# Patient Record
Sex: FEMALE | Race: WHITE | NOT HISPANIC OR LATINO | URBAN - METROPOLITAN AREA
[De-identification: names, ages, dates, MRNs, and addresses within clinical notes are randomized per-mention and may not be internally consistent; named-entity substitution may affect disease eponyms.]

---

## 2017-10-09 ENCOUNTER — EMERGENCY (EMERGENCY)
Facility: HOSPITAL | Age: 52
LOS: 1 days | Discharge: HOME | End: 2017-10-09
Attending: EMERGENCY MEDICINE | Admitting: EMERGENCY MEDICINE
Payer: MEDICARE

## 2017-10-09 VITALS
HEIGHT: 66 IN | TEMPERATURE: 99 F | RESPIRATION RATE: 20 BRPM | DIASTOLIC BLOOD PRESSURE: 96 MMHG | OXYGEN SATURATION: 100 % | WEIGHT: 130.07 LBS | SYSTOLIC BLOOD PRESSURE: 170 MMHG | HEART RATE: 108 BPM

## 2017-10-09 VITALS
DIASTOLIC BLOOD PRESSURE: 88 MMHG | TEMPERATURE: 98 F | SYSTOLIC BLOOD PRESSURE: 139 MMHG | RESPIRATION RATE: 16 BRPM | OXYGEN SATURATION: 98 % | HEART RATE: 83 BPM

## 2017-10-09 DIAGNOSIS — S42.009A FRACTURE OF UNSPECIFIED PART OF UNSPECIFIED CLAVICLE, INITIAL ENCOUNTER FOR CLOSED FRACTURE: Chronic | ICD-10-CM

## 2017-10-09 DIAGNOSIS — F31.30 BIPOLAR DISORDER, CURRENT EPISODE DEPRESSED, MILD OR MODERATE SEVERITY, UNSPECIFIED: ICD-10-CM

## 2017-10-09 DIAGNOSIS — R69 ILLNESS, UNSPECIFIED: ICD-10-CM

## 2017-10-09 LAB
AMPHET UR-MCNC: POSITIVE
ANION GAP SERPL CALC-SCNC: 17 MMOL/L — SIGNIFICANT CHANGE UP (ref 5–17)
APAP SERPL-MCNC: <7.5 UG/ML — LOW (ref 10–26)
APPEARANCE UR: CLEAR — SIGNIFICANT CHANGE UP
BACTERIA # UR AUTO: ABNORMAL
BARBITURATES UR SCN-MCNC: NEGATIVE — SIGNIFICANT CHANGE UP
BASOPHILS # BLD AUTO: 0 K/UL — SIGNIFICANT CHANGE UP (ref 0–0.2)
BASOPHILS NFR BLD AUTO: 0 % — SIGNIFICANT CHANGE UP (ref 0–2)
BENZODIAZ UR-MCNC: NEGATIVE — SIGNIFICANT CHANGE UP
BILIRUB UR-MCNC: NEGATIVE — SIGNIFICANT CHANGE UP
BUN SERPL-MCNC: 17 MG/DL — SIGNIFICANT CHANGE UP (ref 8–20)
CALCIUM SERPL-MCNC: 9.3 MG/DL — SIGNIFICANT CHANGE UP (ref 8.6–10.2)
CHLORIDE SERPL-SCNC: 101 MMOL/L — SIGNIFICANT CHANGE UP (ref 98–107)
CO2 SERPL-SCNC: 22 MMOL/L — SIGNIFICANT CHANGE UP (ref 22–29)
COCAINE METAB.OTHER UR-MCNC: POSITIVE
COLOR SPEC: YELLOW — SIGNIFICANT CHANGE UP
CREAT SERPL-MCNC: 0.64 MG/DL — SIGNIFICANT CHANGE UP (ref 0.5–1.3)
DIFF PNL FLD: ABNORMAL
EOSINOPHIL # BLD AUTO: 0 K/UL — SIGNIFICANT CHANGE UP (ref 0–0.5)
EOSINOPHIL NFR BLD AUTO: 0 % — SIGNIFICANT CHANGE UP (ref 0–6)
EPI CELLS # UR: SIGNIFICANT CHANGE UP
GLUCOSE SERPL-MCNC: 144 MG/DL — HIGH (ref 70–115)
GLUCOSE UR QL: 50 MG/DL
HCG UR QL: NEGATIVE — SIGNIFICANT CHANGE UP
HCT VFR BLD CALC: 42.1 % — SIGNIFICANT CHANGE UP (ref 37–47)
HGB BLD-MCNC: 13.7 G/DL — SIGNIFICANT CHANGE UP (ref 12–16)
KETONES UR-MCNC: NEGATIVE — SIGNIFICANT CHANGE UP
LEUKOCYTE ESTERASE UR-ACNC: ABNORMAL
LYMPHOCYTES # BLD AUTO: 1 K/UL — SIGNIFICANT CHANGE UP (ref 1–4.8)
LYMPHOCYTES # BLD AUTO: 10 % — LOW (ref 20–55)
MCHC RBC-ENTMCNC: 27.8 PG — SIGNIFICANT CHANGE UP (ref 27–31)
MCHC RBC-ENTMCNC: 32.5 G/DL — SIGNIFICANT CHANGE UP (ref 32–36)
MCV RBC AUTO: 85.4 FL — SIGNIFICANT CHANGE UP (ref 81–99)
METHADONE UR-MCNC: NEGATIVE — SIGNIFICANT CHANGE UP
MONOCYTES # BLD AUTO: 0.4 K/UL — SIGNIFICANT CHANGE UP (ref 0–0.8)
MONOCYTES NFR BLD AUTO: 3 % — SIGNIFICANT CHANGE UP (ref 3–10)
NEUTROPHILS # BLD AUTO: 10.2 K/UL — HIGH (ref 1.8–8)
NEUTROPHILS NFR BLD AUTO: 85 % — HIGH (ref 37–73)
NITRITE UR-MCNC: NEGATIVE — SIGNIFICANT CHANGE UP
OPIATES UR-MCNC: NEGATIVE — SIGNIFICANT CHANGE UP
PCP SPEC-MCNC: SIGNIFICANT CHANGE UP
PCP UR-MCNC: NEGATIVE — SIGNIFICANT CHANGE UP
PH UR: 6 — SIGNIFICANT CHANGE UP (ref 5–8)
PLAT MORPH BLD: NORMAL — SIGNIFICANT CHANGE UP
PLATELET # BLD AUTO: 383 K/UL — SIGNIFICANT CHANGE UP (ref 150–400)
POTASSIUM SERPL-MCNC: 3.6 MMOL/L — SIGNIFICANT CHANGE UP (ref 3.5–5.3)
POTASSIUM SERPL-SCNC: 3.6 MMOL/L — SIGNIFICANT CHANGE UP (ref 3.5–5.3)
PROT UR-MCNC: 30 MG/DL
RBC # BLD: 4.93 M/UL — SIGNIFICANT CHANGE UP (ref 4.4–5.2)
RBC # FLD: 14.1 % — SIGNIFICANT CHANGE UP (ref 11–15.6)
RBC BLD AUTO: NORMAL — SIGNIFICANT CHANGE UP
RBC CASTS # UR COMP ASSIST: SIGNIFICANT CHANGE UP /HPF (ref 0–4)
SALICYLATES SERPL-MCNC: <2 MG/DL — LOW (ref 10–20)
SODIUM SERPL-SCNC: 140 MMOL/L — SIGNIFICANT CHANGE UP (ref 135–145)
SP GR SPEC: 1.01 — SIGNIFICANT CHANGE UP (ref 1.01–1.02)
THC UR QL: POSITIVE
UROBILINOGEN FLD QL: 4 MG/DL
VARIANT LYMPHS # BLD: 2 % — SIGNIFICANT CHANGE UP (ref 0–6)
WBC # BLD: 11.6 K/UL — HIGH (ref 4.8–10.8)
WBC # FLD AUTO: 11.6 K/UL — HIGH (ref 4.8–10.8)
WBC UR QL: SIGNIFICANT CHANGE UP

## 2017-10-09 PROCEDURE — 81001 URINALYSIS AUTO W/SCOPE: CPT

## 2017-10-09 PROCEDURE — 99283 EMERGENCY DEPT VISIT LOW MDM: CPT

## 2017-10-09 PROCEDURE — 93010 ELECTROCARDIOGRAM REPORT: CPT

## 2017-10-09 PROCEDURE — 36415 COLL VENOUS BLD VENIPUNCTURE: CPT

## 2017-10-09 PROCEDURE — 85027 COMPLETE CBC AUTOMATED: CPT

## 2017-10-09 PROCEDURE — 99284 EMERGENCY DEPT VISIT MOD MDM: CPT

## 2017-10-09 PROCEDURE — 90792 PSYCH DIAG EVAL W/MED SRVCS: CPT

## 2017-10-09 PROCEDURE — 93005 ELECTROCARDIOGRAM TRACING: CPT

## 2017-10-09 PROCEDURE — 80307 DRUG TEST PRSMV CHEM ANLYZR: CPT

## 2017-10-09 PROCEDURE — 81025 URINE PREGNANCY TEST: CPT

## 2017-10-09 PROCEDURE — 80048 BASIC METABOLIC PNL TOTAL CA: CPT

## 2017-10-09 RX ORDER — DEXTROAMPHETAMINE SACCHARATE, AMPHETAMINE ASPARTATE, DEXTROAMPHETAMINE SULFATE AND AMPHETAMINE SULFATE 1.875; 1.875; 1.875; 1.875 MG/1; MG/1; MG/1; MG/1
1 TABLET ORAL
Qty: 28 | Refills: 0
Start: 2017-10-09 | End: 2017-10-23

## 2017-10-09 NOTE — ED STATDOCS - MEDICAL DECISION MAKING DETAILS
Will have pt evaluated by . Will have pt evaluated by . Will refer pt for follow up as she does not currently have anyone to follow up with.

## 2017-10-09 NOTE — ED ADULT NURSE NOTE - CHPI ED SYMPTOMS NEG
no weakness/no suicidal/no hallucinations/no change in level of consciousness/no confusion/no disorientation/no homicidal/no paranoia

## 2017-10-09 NOTE — ED STATDOCS - OBJECTIVE STATEMENT
52 year old female presenting to the ED for a medication refill. Pt states that she had been taking an antidepressant and that she believes she is experiencing withdrawal symptoms as she stopped taking the medication. She states that she was also prescribed Xanax, Adderall, and Suboxone. Pt states that she had "weened herself off of the Suboxone". Pt denies having any SI or HI. No further complaints at this time. 52 year old female presenting to the ED for a medication refill. Pt states that she had been taking an antidepressant and that she believes she is experiencing withdrawal symptoms as she stopped taking the medication. She states that she was also prescribed Xanax, Adderall, and Suboxone. Pt states that she had "weened herself off of the Suboxone". She states that she does not currently have a psychiatrist with whom she can follow up. Pt denies having any SI or HI. No further complaints at this time.

## 2017-10-09 NOTE — ED ADULT NURSE NOTE - OBJECTIVE STATEMENT
pt states she has run out of medications and is not able to get a refill because her provider dropped her because of testing positive for cocaine.  pt states she has been depressed x5 days with intermittent periods of crying. pt states she had suicidal thoughts 5 days ago but no intention of harming herself. denies suicidal or homicidal thoughts denies hallucinations both A/V, pt presents with depressed affect appears organized. pt requesting rx for paxil and adderral

## 2017-10-09 NOTE — ED BEHAVIORAL HEALTH ASSESSMENT NOTE - HPI (INCLUDE ILLNESS QUALITY, SEVERITY, DURATION, TIMING, CONTEXT, MODIFYING FACTORS, ASSOCIATED SIGNS AND SYMPTOMS)
Pt. is a 52 yo  female who presents to the ED for medication refills.  She reports she was previously under the care of Arabella Mac for psychiatric medications and Suboxone.  She said she made a mistake and used cocaine which showed up in her drug screen so her provider refuses to prescribe any of her medication.  Pt. reports she takes : Suboxone which she stopped, Xanax 0.5mg BID PRN, Prozac 20mg QD; Lamictal 100mg QAM and Adderall 5mg QAM and Qnoon.  Pt. reports hx of shoulder injury which makes it difficult for her to work.  She is working p/t at [employer] and finds the Adderall helps her to focus.  She reports she has not had Suboxone in five days and went through withdrawal, sweating, nausea.  Denies any withdrawal sxs. currently.  Pt. denies any suicidal or homicidal ideation/plan/intent.  She reports she had SI about five years ago but did not act on it.  She denies auditory/visual/hallucinations, paranoia, delusions, mood lability.  She lives by herself in her own apartment.   three times.  Pt. reports her sister who is in the mental health field is supportive and she speaks to her several times a week. Sleep and appetite are WNL.  Pt. would like new provider.  Will link with [facility].
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
## 2017-10-09 NOTE — ED BEHAVIORAL HEALTH ASSESSMENT NOTE - SUICIDE PROTECTIVE FACTORS
Future oriented/Engaged in work or school/Identifies reasons for living/Supportive social network or family/Responsibility to family and others/Positive therapeutic relationships

## 2018-05-24 ENCOUNTER — APPOINTMENT (OUTPATIENT)
Dept: CARDIOLOGY | Facility: CLINIC | Age: 53
End: 2018-05-24

## 2018-07-30 NOTE — ED BEHAVIORAL HEALTH ASSESSMENT NOTE - NS ED BHA MED ROS EYES
How Severe Is Your Skin Lesion?: moderate Have Your Skin Lesions Been Treated?: not been treated Is This A New Presentation, Or A Follow-Up?: Skin Lesions No complaints

## 2019-08-19 ENCOUNTER — EMERGENCY (EMERGENCY)
Facility: HOSPITAL | Age: 54
LOS: 1 days | Discharge: HOME | End: 2019-08-19
Attending: EMERGENCY MEDICINE
Payer: COMMERCIAL

## 2019-08-19 VITALS
SYSTOLIC BLOOD PRESSURE: 155 MMHG | WEIGHT: 132.06 LBS | HEART RATE: 98 BPM | HEIGHT: 66 IN | RESPIRATION RATE: 18 BRPM | OXYGEN SATURATION: 96 % | DIASTOLIC BLOOD PRESSURE: 90 MMHG | TEMPERATURE: 99 F

## 2019-08-19 DIAGNOSIS — S42.009A FRACTURE OF UNSPECIFIED PART OF UNSPECIFIED CLAVICLE, INITIAL ENCOUNTER FOR CLOSED FRACTURE: Chronic | ICD-10-CM

## 2019-08-19 PROCEDURE — 99283 EMERGENCY DEPT VISIT LOW MDM: CPT

## 2019-08-19 PROCEDURE — 73080 X-RAY EXAM OF ELBOW: CPT | Mod: 26,LT

## 2019-08-19 PROCEDURE — 73080 X-RAY EXAM OF ELBOW: CPT

## 2019-08-19 RX ORDER — IPRATROPIUM/ALBUTEROL SULFATE 18-103MCG
3 AEROSOL WITH ADAPTER (GRAM) INHALATION ONCE
Refills: 0 | Status: DISCONTINUED | OUTPATIENT
Start: 2019-08-19 | End: 2019-08-19
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
## 2019-08-19 NOTE — ED STATDOCS - CLINICAL SUMMARY MEDICAL DECISION MAKING FREE TEXT BOX
Ms. Valle is a 55 yo F with a h/o COPD who presents with increased SOB/cough and L elbow pain. L elbow has TTP to medial and lateral surfaces, and will receive and X-ray and pain control. For her SOB she will get a CXR, CBC, and duoneb treatment. We will re-assess after additional evaluation. Ms. Valle is a 53 yo F with a h/o COPD who presents with increased SOB/cough and L elbow pain. L elbow has TTP to medial and lateral surfaces, and will receive and X-ray and pain control.  We will re-assess after additional evaluation.

## 2019-08-19 NOTE — ED STATDOCS - ATTENDING CONTRIBUTION TO CARE
pt with elbow pain and hx copd. Reports a fall two weeks ago now numb and tingling in  hand to elbow. No diminished ROM. NVI. No deformity.   Pt reports increased use nebulizer/inhaler over past two days but lungs min diminished r only.  agree with care.   I, Amanda Head, performed the initial face to face bedside interview with this patient regarding history of present illness, review of symptoms and relevant past medical, social and family history.  I completed an independent physical examination.  I was the initial provider who evaluated this patient. I have signed out the follow up of any pending tests (i.e. labs, radiological studies) to the ACP.  I have communicated the patient’s plan of care and disposition with the ACP.

## 2019-08-19 NOTE — ED STATDOCS - MUSCULOSKELETAL, MLM
L elbow TTP on medial and lateral epicondyles. Normal range of motion of L elbow. Normal ROM R elbow, no TTP R elbow.
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
## 2019-08-19 NOTE — ED STATDOCS - PROGRESS NOTE DETAILS
PA NOTE: No acute findings on xray. Pt given follow up information for Dr. Lara if symptoms persist. NSAIDS / Rice therapy.

## 2019-09-04 ENCOUNTER — APPOINTMENT (OUTPATIENT)
Dept: PULMONOLOGY | Facility: CLINIC | Age: 54
End: 2019-09-04

## 2019-11-29 NOTE — ED ADULT NURSE NOTE - NS ED NURSE LEVEL OF CONSCIOUSNESS SPEECH
[FreeTextEntry1] : I, Favio Epps, solely acted as scribe for Dr. Manpreet Sandy on 11/27/2019.\par 
Speaking Coherently

## 2021-01-03 ENCOUNTER — INPATIENT (INPATIENT)
Facility: HOSPITAL | Age: 56
LOS: 3 days | Discharge: HOME | DRG: 247 | End: 2021-01-07
Attending: HOSPITALIST | Admitting: SPECIALIST
Payer: COMMERCIAL

## 2021-01-03 VITALS
HEIGHT: 66 IN | HEART RATE: 85 BPM | OXYGEN SATURATION: 96 % | TEMPERATURE: 99 F | RESPIRATION RATE: 16 BRPM | SYSTOLIC BLOOD PRESSURE: 140 MMHG | DIASTOLIC BLOOD PRESSURE: 92 MMHG | WEIGHT: 130.07 LBS

## 2021-01-03 DIAGNOSIS — S42.009A FRACTURE OF UNSPECIFIED PART OF UNSPECIFIED CLAVICLE, INITIAL ENCOUNTER FOR CLOSED FRACTURE: Chronic | ICD-10-CM

## 2021-01-03 DIAGNOSIS — I21.3 ST ELEVATION (STEMI) MYOCARDIAL INFARCTION OF UNSPECIFIED SITE: ICD-10-CM

## 2021-01-03 PROBLEM — J44.9 CHRONIC OBSTRUCTIVE PULMONARY DISEASE, UNSPECIFIED: Chronic | Status: ACTIVE | Noted: 2019-08-19

## 2021-01-03 LAB
ALBUMIN SERPL ELPH-MCNC: 3.9 G/DL — SIGNIFICANT CHANGE UP (ref 3.3–5.2)
ALBUMIN SERPL ELPH-MCNC: 4.4 G/DL — SIGNIFICANT CHANGE UP (ref 3.3–5.2)
ALP SERPL-CCNC: 115 U/L — SIGNIFICANT CHANGE UP (ref 40–120)
ALP SERPL-CCNC: 127 U/L — HIGH (ref 40–120)
ALT FLD-CCNC: 33 U/L — HIGH
ALT FLD-CCNC: 36 U/L — HIGH
ANION GAP SERPL CALC-SCNC: 12 MMOL/L — SIGNIFICANT CHANGE UP (ref 5–17)
ANION GAP SERPL CALC-SCNC: 15 MMOL/L — SIGNIFICANT CHANGE UP (ref 5–17)
APTT BLD: 54.9 SEC — HIGH (ref 27.5–35.5)
AST SERPL-CCNC: 171 U/L — HIGH
AST SERPL-CCNC: 172 U/L — HIGH
BASOPHILS # BLD AUTO: 0.08 K/UL — SIGNIFICANT CHANGE UP (ref 0–0.2)
BASOPHILS # BLD AUTO: 0.08 K/UL — SIGNIFICANT CHANGE UP (ref 0–0.2)
BASOPHILS NFR BLD AUTO: 0.4 % — SIGNIFICANT CHANGE UP (ref 0–2)
BASOPHILS NFR BLD AUTO: 0.5 % — SIGNIFICANT CHANGE UP (ref 0–2)
BILIRUB SERPL-MCNC: 0.6 MG/DL — SIGNIFICANT CHANGE UP (ref 0.4–2)
BILIRUB SERPL-MCNC: 0.7 MG/DL — SIGNIFICANT CHANGE UP (ref 0.4–2)
BUN SERPL-MCNC: 15 MG/DL — SIGNIFICANT CHANGE UP (ref 8–20)
BUN SERPL-MCNC: 15 MG/DL — SIGNIFICANT CHANGE UP (ref 8–20)
CALCIUM SERPL-MCNC: 9 MG/DL — SIGNIFICANT CHANGE UP (ref 8.6–10.2)
CALCIUM SERPL-MCNC: 9.8 MG/DL — SIGNIFICANT CHANGE UP (ref 8.6–10.2)
CHLORIDE SERPL-SCNC: 102 MMOL/L — SIGNIFICANT CHANGE UP (ref 98–107)
CHLORIDE SERPL-SCNC: 97 MMOL/L — LOW (ref 98–107)
CK MB CFR SERPL CALC: 75.3 NG/ML — HIGH (ref 0–6.7)
CK MB CFR SERPL CALC: 91.5 NG/ML — HIGH (ref 0–6.7)
CK SERPL-CCNC: 1080 U/L — HIGH (ref 25–170)
CK SERPL-CCNC: 1104 U/L — HIGH (ref 25–170)
CO2 SERPL-SCNC: 23 MMOL/L — SIGNIFICANT CHANGE UP (ref 22–29)
CO2 SERPL-SCNC: 25 MMOL/L — SIGNIFICANT CHANGE UP (ref 22–29)
CREAT SERPL-MCNC: 0.45 MG/DL — LOW (ref 0.5–1.3)
CREAT SERPL-MCNC: 0.64 MG/DL — SIGNIFICANT CHANGE UP (ref 0.5–1.3)
EOSINOPHIL # BLD AUTO: 0.09 K/UL — SIGNIFICANT CHANGE UP (ref 0–0.5)
EOSINOPHIL # BLD AUTO: 0.12 K/UL — SIGNIFICANT CHANGE UP (ref 0–0.5)
EOSINOPHIL NFR BLD AUTO: 0.5 % — SIGNIFICANT CHANGE UP (ref 0–6)
EOSINOPHIL NFR BLD AUTO: 0.7 % — SIGNIFICANT CHANGE UP (ref 0–6)
GLUCOSE SERPL-MCNC: 136 MG/DL — HIGH (ref 70–99)
GLUCOSE SERPL-MCNC: 145 MG/DL — HIGH (ref 70–99)
HCT VFR BLD CALC: 38.8 % — SIGNIFICANT CHANGE UP (ref 34.5–45)
HCT VFR BLD CALC: 43.3 % — SIGNIFICANT CHANGE UP (ref 34.5–45)
HGB BLD-MCNC: 12.6 G/DL — SIGNIFICANT CHANGE UP (ref 11.5–15.5)
HGB BLD-MCNC: 14.2 G/DL — SIGNIFICANT CHANGE UP (ref 11.5–15.5)
IMM GRANULOCYTES NFR BLD AUTO: 0.4 % — SIGNIFICANT CHANGE UP (ref 0–1.5)
IMM GRANULOCYTES NFR BLD AUTO: 0.5 % — SIGNIFICANT CHANGE UP (ref 0–1.5)
INR BLD: 1.19 RATIO — HIGH (ref 0.88–1.16)
LACTATE SERPL-SCNC: 0.8 MMOL/L — SIGNIFICANT CHANGE UP (ref 0.5–2)
LYMPHOCYTES # BLD AUTO: 1.37 K/UL — SIGNIFICANT CHANGE UP (ref 1–3.3)
LYMPHOCYTES # BLD AUTO: 1.63 K/UL — SIGNIFICANT CHANGE UP (ref 1–3.3)
LYMPHOCYTES # BLD AUTO: 7.3 % — LOW (ref 13–44)
LYMPHOCYTES # BLD AUTO: 9.2 % — LOW (ref 13–44)
MAGNESIUM SERPL-MCNC: 1.6 MG/DL — LOW (ref 1.8–2.6)
MCHC RBC-ENTMCNC: 30.4 PG — SIGNIFICANT CHANGE UP (ref 27–34)
MCHC RBC-ENTMCNC: 30.6 PG — SIGNIFICANT CHANGE UP (ref 27–34)
MCHC RBC-ENTMCNC: 32.5 GM/DL — SIGNIFICANT CHANGE UP (ref 32–36)
MCHC RBC-ENTMCNC: 32.8 GM/DL — SIGNIFICANT CHANGE UP (ref 32–36)
MCV RBC AUTO: 93.3 FL — SIGNIFICANT CHANGE UP (ref 80–100)
MCV RBC AUTO: 93.7 FL — SIGNIFICANT CHANGE UP (ref 80–100)
MONOCYTES # BLD AUTO: 1.16 K/UL — HIGH (ref 0–0.9)
MONOCYTES # BLD AUTO: 1.32 K/UL — HIGH (ref 0–0.9)
MONOCYTES NFR BLD AUTO: 6.2 % — SIGNIFICANT CHANGE UP (ref 2–14)
MONOCYTES NFR BLD AUTO: 7.5 % — SIGNIFICANT CHANGE UP (ref 2–14)
NEUTROPHILS # BLD AUTO: 14.46 K/UL — HIGH (ref 1.8–7.4)
NEUTROPHILS # BLD AUTO: 16.02 K/UL — HIGH (ref 1.8–7.4)
NEUTROPHILS NFR BLD AUTO: 81.6 % — HIGH (ref 43–77)
NEUTROPHILS NFR BLD AUTO: 85.2 % — HIGH (ref 43–77)
PHOSPHATE SERPL-MCNC: 2.7 MG/DL — SIGNIFICANT CHANGE UP (ref 2.4–4.7)
PLATELET # BLD AUTO: 336 K/UL — SIGNIFICANT CHANGE UP (ref 150–400)
PLATELET # BLD AUTO: 394 K/UL — SIGNIFICANT CHANGE UP (ref 150–400)
POTASSIUM SERPL-MCNC: 3 MMOL/L — LOW (ref 3.5–5.3)
POTASSIUM SERPL-MCNC: 3.2 MMOL/L — LOW (ref 3.5–5.3)
POTASSIUM SERPL-SCNC: 3 MMOL/L — LOW (ref 3.5–5.3)
POTASSIUM SERPL-SCNC: 3.2 MMOL/L — LOW (ref 3.5–5.3)
PROCALCITONIN SERPL-MCNC: 19.63 NG/ML — HIGH (ref 0.02–0.1)
PROT SERPL-MCNC: 7 G/DL — SIGNIFICANT CHANGE UP (ref 6.6–8.7)
PROT SERPL-MCNC: 7.9 G/DL — SIGNIFICANT CHANGE UP (ref 6.6–8.7)
PROTHROM AB SERPL-ACNC: 13.7 SEC — HIGH (ref 10.6–13.6)
RBC # BLD: 4.14 M/UL — SIGNIFICANT CHANGE UP (ref 3.8–5.2)
RBC # BLD: 4.64 M/UL — SIGNIFICANT CHANGE UP (ref 3.8–5.2)
RBC # FLD: 13.6 % — SIGNIFICANT CHANGE UP (ref 10.3–14.5)
RBC # FLD: 13.7 % — SIGNIFICANT CHANGE UP (ref 10.3–14.5)
SARS-COV-2 RNA SPEC QL NAA+PROBE: SIGNIFICANT CHANGE UP
SODIUM SERPL-SCNC: 137 MMOL/L — SIGNIFICANT CHANGE UP (ref 135–145)
SODIUM SERPL-SCNC: 137 MMOL/L — SIGNIFICANT CHANGE UP (ref 135–145)
TROPONIN T SERPL-MCNC: 1.04 NG/ML — HIGH (ref 0–0.06)
TSH SERPL-MCNC: 0.55 UIU/ML — SIGNIFICANT CHANGE UP (ref 0.27–4.2)
WBC # BLD: 17.7 K/UL — HIGH (ref 3.8–10.5)
WBC # BLD: 18.8 K/UL — HIGH (ref 3.8–10.5)
WBC # FLD AUTO: 17.7 K/UL — HIGH (ref 3.8–10.5)
WBC # FLD AUTO: 18.8 K/UL — HIGH (ref 3.8–10.5)

## 2021-01-03 PROCEDURE — 93010 ELECTROCARDIOGRAM REPORT: CPT

## 2021-01-03 PROCEDURE — 71045 X-RAY EXAM CHEST 1 VIEW: CPT | Mod: 26

## 2021-01-03 PROCEDURE — 99222 1ST HOSP IP/OBS MODERATE 55: CPT

## 2021-01-03 PROCEDURE — 99291 CRITICAL CARE FIRST HOUR: CPT

## 2021-01-03 RX ORDER — CANGRELOR 50 MG/1
4 INJECTION, POWDER, LYOPHILIZED, FOR SOLUTION INTRAVENOUS
Qty: 50 | Refills: 0 | Status: DISCONTINUED | OUTPATIENT
Start: 2021-01-03 | End: 2021-01-03

## 2021-01-03 RX ORDER — HEPARIN SODIUM 5000 [USP'U]/ML
5000 INJECTION INTRAVENOUS; SUBCUTANEOUS ONCE
Refills: 0 | Status: COMPLETED | OUTPATIENT
Start: 2021-01-03 | End: 2021-01-03

## 2021-01-03 RX ORDER — INFLUENZA VIRUS VACCINE 15; 15; 15; 15 UG/.5ML; UG/.5ML; UG/.5ML; UG/.5ML
0.5 SUSPENSION INTRAMUSCULAR ONCE
Refills: 0 | Status: COMPLETED | OUTPATIENT
Start: 2021-01-03 | End: 2021-01-03

## 2021-01-03 RX ORDER — CHLORHEXIDINE GLUCONATE 213 G/1000ML
1 SOLUTION TOPICAL
Refills: 0 | Status: DISCONTINUED | OUTPATIENT
Start: 2021-01-03 | End: 2021-01-07

## 2021-01-03 RX ORDER — LOSARTAN POTASSIUM 100 MG/1
25 TABLET, FILM COATED ORAL DAILY
Refills: 0 | Status: DISCONTINUED | OUTPATIENT
Start: 2021-01-03 | End: 2021-01-07

## 2021-01-03 RX ORDER — ALBUTEROL 90 UG/1
2 AEROSOL, METERED ORAL EVERY 4 HOURS
Refills: 0 | Status: DISCONTINUED | OUTPATIENT
Start: 2021-01-03 | End: 2021-01-07

## 2021-01-03 RX ORDER — ASPIRIN/CALCIUM CARB/MAGNESIUM 324 MG
325 TABLET ORAL ONCE
Refills: 0 | Status: COMPLETED | OUTPATIENT
Start: 2021-01-03 | End: 2021-01-03

## 2021-01-03 RX ORDER — HEPARIN SODIUM 5000 [USP'U]/ML
5000 INJECTION INTRAVENOUS; SUBCUTANEOUS EVERY 8 HOURS
Refills: 0 | Status: DISCONTINUED | OUTPATIENT
Start: 2021-01-03 | End: 2021-01-07

## 2021-01-03 RX ORDER — IPRATROPIUM/ALBUTEROL SULFATE 18-103MCG
3 AEROSOL WITH ADAPTER (GRAM) INHALATION EVERY 6 HOURS
Refills: 0 | Status: DISCONTINUED | OUTPATIENT
Start: 2021-01-03 | End: 2021-01-07

## 2021-01-03 RX ORDER — LAMOTRIGINE 25 MG/1
100 TABLET, ORALLY DISINTEGRATING ORAL DAILY
Refills: 0 | Status: DISCONTINUED | OUTPATIENT
Start: 2021-01-03 | End: 2021-01-07

## 2021-01-03 RX ORDER — ATORVASTATIN CALCIUM 80 MG/1
80 TABLET, FILM COATED ORAL AT BEDTIME
Refills: 0 | Status: DISCONTINUED | OUTPATIENT
Start: 2021-01-03 | End: 2021-01-07

## 2021-01-03 RX ORDER — ONDANSETRON 8 MG/1
4 TABLET, FILM COATED ORAL ONCE
Refills: 0 | Status: COMPLETED | OUTPATIENT
Start: 2021-01-03 | End: 2021-01-03

## 2021-01-03 RX ORDER — NICOTINE POLACRILEX 2 MG
1 GUM BUCCAL DAILY
Refills: 0 | Status: DISCONTINUED | OUTPATIENT
Start: 2021-01-03 | End: 2021-01-07

## 2021-01-03 RX ORDER — POTASSIUM CHLORIDE 20 MEQ
40 PACKET (EA) ORAL ONCE
Refills: 0 | Status: COMPLETED | OUTPATIENT
Start: 2021-01-03 | End: 2021-01-03

## 2021-01-03 RX ORDER — BUDESONIDE AND FORMOTEROL FUMARATE DIHYDRATE 160; 4.5 UG/1; UG/1
2 AEROSOL RESPIRATORY (INHALATION)
Refills: 0 | Status: DISCONTINUED | OUTPATIENT
Start: 2021-01-03 | End: 2021-01-07

## 2021-01-03 RX ORDER — METOPROLOL TARTRATE 50 MG
25 TABLET ORAL
Refills: 0 | Status: DISCONTINUED | OUTPATIENT
Start: 2021-01-03 | End: 2021-01-07

## 2021-01-03 RX ORDER — TICAGRELOR 90 MG/1
90 TABLET ORAL EVERY 12 HOURS
Refills: 0 | Status: DISCONTINUED | OUTPATIENT
Start: 2021-01-03 | End: 2021-01-06

## 2021-01-03 RX ORDER — MAGNESIUM SULFATE 500 MG/ML
2 VIAL (ML) INJECTION ONCE
Refills: 0 | Status: COMPLETED | OUTPATIENT
Start: 2021-01-03 | End: 2021-01-03

## 2021-01-03 RX ORDER — ALPRAZOLAM 0.25 MG
0.25 TABLET ORAL EVERY 12 HOURS
Refills: 0 | Status: DISCONTINUED | OUTPATIENT
Start: 2021-01-03 | End: 2021-01-07

## 2021-01-03 RX ORDER — ASPIRIN/CALCIUM CARB/MAGNESIUM 324 MG
81 TABLET ORAL DAILY
Refills: 0 | Status: DISCONTINUED | OUTPATIENT
Start: 2021-01-04 | End: 2021-01-07

## 2021-01-03 RX ADMIN — Medication 40 MILLIEQUIVALENT(S): at 15:57

## 2021-01-03 RX ADMIN — ATORVASTATIN CALCIUM 80 MILLIGRAM(S): 80 TABLET, FILM COATED ORAL at 22:18

## 2021-01-03 RX ADMIN — LOSARTAN POTASSIUM 25 MILLIGRAM(S): 100 TABLET, FILM COATED ORAL at 14:54

## 2021-01-03 RX ADMIN — HEPARIN SODIUM 5000 UNIT(S): 5000 INJECTION INTRAVENOUS; SUBCUTANEOUS at 10:35

## 2021-01-03 RX ADMIN — Medication 0.25 MILLIGRAM(S): at 18:56

## 2021-01-03 RX ADMIN — Medication 50 GRAM(S): at 15:57

## 2021-01-03 RX ADMIN — ONDANSETRON 4 MILLIGRAM(S): 8 TABLET, FILM COATED ORAL at 10:58

## 2021-01-03 RX ADMIN — HEPARIN SODIUM 5000 UNIT(S): 5000 INJECTION INTRAVENOUS; SUBCUTANEOUS at 17:41

## 2021-01-03 RX ADMIN — Medication 325 MILLIGRAM(S): at 10:50

## 2021-01-03 RX ADMIN — TICAGRELOR 90 MILLIGRAM(S): 90 TABLET ORAL at 17:41

## 2021-01-03 RX ADMIN — LAMOTRIGINE 100 MILLIGRAM(S): 25 TABLET, ORALLY DISINTEGRATING ORAL at 14:54

## 2021-01-03 RX ADMIN — Medication 25 MILLIGRAM(S): at 18:00

## 2021-01-03 NOTE — ED PROVIDER NOTE - CLINICAL SUMMARY MEDICAL DECISION MAKING FREE TEXT BOX
Code STEMI activated by intake physician.  Repeat EKG with acute inf wall MI with reciprocal changes.  Case d/w Interventional Cardio/Masood Landis and will be in to take pt to cath lab

## 2021-01-03 NOTE — ED PROVIDER NOTE - PMH
Anxiety    Asthma    Chronic obstructive pulmonary disease, unspecified COPD type     Anxiety    Asthma    Chronic obstructive pulmonary disease, unspecified COPD type    HTN (hypertension)

## 2021-01-03 NOTE — H&P ADULT - NSICDXPASTMEDICALHX_GEN_ALL_CORE_FT
PAST MEDICAL HISTORY:  Anxiety     Asthma     Chronic obstructive pulmonary disease, unspecified COPD type     HTN (hypertension)

## 2021-01-03 NOTE — ED PROVIDER NOTE - MUSCULOSKELETAL NEGATIVE STATEMENT, MLM
ED HPI GENERAL MEDICAL PROBLEM





- General


Chief Complaint: Back Pain or Injury


Stated Complaint: Back pain


Time Seen by Provider: 11/12/20 04:00


Source of Information: Reports: Patient


History Limitations: Reports: No Limitations





- History of Present Illness


INITIAL COMMENTS - FREE TEXT/NARRATIVE: 





Patient comes emergency department for the second visit in the last 24 hours for

complaints of worsening left anterior chronic radicular type thigh pain that has

been worse for the past 24 hrs.  See my note from 11/11/2020 for the extensive 

history of this left anterior thigh radicular type pain that she has had since 

July that she has been seen multiple times for in the emergency department. She 

has had no injury recently. She just yesterday was seen in the pain clinic in 

[place] where she received a spinal injection which helped with her pain down the 

posterior aspect of her left thigh but nothing on the anterior aspect of the 

thigh.  She had good pain relief yesterday with the Dilaudid and the Phenergan 

and she was started on gabapentin although she is unable to sleep due to the 

pain this morning. No covid exposure no Covid Symptoms. 


Treatments PTA: Reports: Cold Therapy, Other (see below)


Other Treatments PTA: Started on Gabapentin today.


  ** left groin/thigh


Pain Score (Numeric/FACES): 8





- Related Data


                                    Allergies











Allergy/AdvReac Type Severity Reaction Status Date / Time


 


No Known Allergies Allergy   Verified 11/11/20 13:17











Home Meds: 


                                    Home Meds





Cyclobenzaprine [Flexeril] 10 mg PO TID PRN 11/07/20 [History]


Diclofenac Sodium 75 mg PO BID PRN 11/07/20 [History]


predniSONE [Prednisone] 20 mg PO DAILY #5 tablet 11/07/20 [Rx]


Gabapentin [Neurontin] 300 mg PO DAILY #30 cap 11/11/20 [Rx]











Past Medical History





- Past Health History


Medical/Surgical History: Denies Medical/Surgical History


Other Musculoskeletal History: sciatic pain, left sided SI joint problems


Oncologic (Cancer) History: Reports: Uterine





- Past Surgical History


Female  Surgical History: Reports: Hysterectomy





Social & Family History





- Family History


Family Medical History: Unobtainable





- Tobacco Use


Tobacco Use Status *Q: Unknown Ever Used Tobacco





- Caffeine Use


Caffeine Use: Reports: None





ED ROS GENERAL





- Review of Systems


Review Of Systems: Comprehensive ROS is negative, except as noted in HPI.





ED EXAM,LOWER BACK PAIN/INJURY





- Physical Exam


Exam: See Below


Text/Narrative:: 





The patient is somewhat anxious as she is sitting there constantly rubbing the 

anterior aspect of her left thigh.


Exam Limited By: No Limitations


General Appearance: Alert, WD/WN, Anxious


Respiratory/Chest: No Respiratory Distress


Cardiovascular: Normal Peripheral Pulses


Back Exam: Normal Inspection, Full Range of Motion.  No: Paraspinal Tenderness, 

Vertebral Tenderness


Extremities: Normal Inspection, Normal Range of Motion


Neurological: Alert, Normal Mood/Affect, Normal Dorsiflexion, Normal Plantar 

Flexion, Normal Gait, Normal Reflexes, No Motor/Sensory Deficits


DTR - Lower Extremities: 2+: Knee (R), Knee (L), Ankle (R), Ankle (L)


Psychiatric: Normal Affect, Normal Mood


Skin Exam: Warm, Dry, Intact, Normal Color, No Rash





Course





- Vital Signs


Last Recorded V/S: 


                                Last Vital Signs











Temp  97.7 F   11/12/20 04:00


 


Pulse  83   11/12/20 04:00


 


Resp  16   11/12/20 04:00


 


BP  116/78   11/12/20 04:00


 


Pulse Ox  99   11/12/20 04:00














- Orders/Labs/Meds


Meds: 


Medications














Discontinued Medications














Generic Name Dose Route Start Last Admin





  Trade Name Leoq  PRN Reason Stop Dose Admin


 


Hydromorphone HCl  1 mg  11/12/20 04:14  11/12/20 04:25





  Dilaudid  IM  11/12/20 04:15  1 mg





  ONETIME ONE   Administration


 


Oxycodone/Acetaminophen  1 packet  11/12/20 04:25  11/12/20 05:20





  Take Home: Acetamin/Oxycodon 325-5 Mg, 5 Pack  PO  11/12/20 04:26  1 packet





  ONETIME ONE   Administration


 


Promethazine HCl  25 mg  11/12/20 04:15  11/12/20 04:26





  Phenergan  IM  11/12/20 04:16  25 mg





  ONETIME ONE   Administration














- Re-Assessments/Exams


Free Text/Narrative Re-Assessment/Exam: 





11/12/20 


Patient was initially given Dilaudid 1 mg IM as well as 25 mg of Phenergan.





I also discussed other modalities for her treatment such as craniosacral therapy

 and/or massage.  I did have a rather extended conversation with this patient 

about the chronic usage of the emergency department for her chronic recurrent 

pain in the left anterior thigh.  She has received multiple injections of 

narcotic pain medication as well as prescriptions.  I also started her on 

gabapentin yesterday.  According to the patient herself she has not followed up 

with primary care very often and/or they are not doing much for her pain.  I 

feel that gabapentin should be a good course of pain management for her but we 

need to give this some time to get to therapy level.  This will be the last 

controlled substance prescription that she is prescribed from myself out of the 

emergency department for her chronic pain.  I will give her a small starter pack

 of 5 tablets of Percocet 53 25.  She must contact her primary care provider 

today and this chronic pain needs to be managed out of the primary care setting 

as this is best for the patient.  I also think that craniosacral therapy would 

be appropriate as well for her.  She is understanding of this and her questions 

are answered.








Departure





- Departure


Time of Disposition: 17:19


Disposition: Home, Self-Care 01


Clinical Impression: 


 Chronic SI joint pain








- Discharge Information


Instructions:  Chronic Back Pain, Easy-to-Read, Pain Medicine Instructions, 

Easy-to-Read


Referrals: 


PCP,Unobtain [Ordering Only Provider] - 


Forms:  ED Department Discharge


Additional Instructions: 


Continue with previous therapy. 


Add Percocet for breakthrough pain. 1 tablet every 6 hrs with food as needed for

pain. Caution sedation. Starter pack given from the ED.


Contact Dr. Weaver today consider a pelvic floor type MRI with your history of 

ovarian cancer. 


Also consider seeing a Cranio Sacral therapist for lower pelvis adjustments for 

pain and therapy. Such as [facility] in [place]. [phone]


Return to the ED if new or worsening symptoms. 


Follow up with Dr. Weaver today in the clinic. 





Sepsis Event Note (ED)





- Evaluation


Sepsis Screening Result: No Definite Risk





- Focused Exam


Vital Signs: 


                                   Vital Signs











  Temp Pulse Resp BP Pulse Ox


 


 11/12/20 04:00  97.7 F  83  16  116/78  99 no back pain, no gout, no musculoskeletal pain, no neck pain, and no weakness.

## 2021-01-03 NOTE — H&P ADULT - ASSESSMENT
1: Acute CAD with s/p distal RCA thrombectomy with angioplasty with one TATY with staged LCx CAD intervention   2: Hypokalemia   3: COPD with active tobacco use d/o   4: HfrEF, newly discovered   5: Anxiety and depression     Patient seen and examined   Full code   Needs MICU monitoring for now as post STEMI, post PCI and intervention, at risk for recurrent CP, life threatening arrhythmia     S/p PCI TATY to RCA and staged PCI for LCx  S/p Cangrelor ; DAPT with ASA and Brilinta  ACE, BB, HD statin, Cardiac rehab  Tele monitoring   Lipid panel, TSh and Aic    Duoneb *1 with PRn albuterol and scheduled Pulmicort  Tobacco cessation counseling , nicotine patch as needed   PFTs as outpatient     HOme meds started for anxiety and depression     I have personally provided  70 minutes of critical care time  including co-ordination of care excluding time spent on sperate procedures

## 2021-01-03 NOTE — ED ADULT NURSE NOTE - OBJECTIVE STATEMENT
Pt with no cardiac history sudden onset diarrhea, abd pain and vomiting yesterday and today states she is having SOB. EKG obtained in triage and code STEMI initiated, pt moved to  ER and MD Major and cardiologist at the bedside. Pt placed on CM and defib pads as precaution. large bore IV obtained.

## 2021-01-03 NOTE — ED PROVIDER NOTE - OBJECTIVE STATEMENT
56 yo F presents to ED c/o gradual onset of substernal chest aching this AM with assoc nausea, sweating and SOB.  Pt admits to increased N/V/D since yesterday 56 yo F presents to ED c/o gradual onset of substernal chest aching this AM with assoc nausea, sweating and SOB.  Pt admits to increased N/V/D since yesterday with decreased appetite.  Pt takes meds for HTN, depression and COPD, still smokes 1/4 ppd.  + family hx of CAD, brother with hx MI at age 54, father with hx CAD s/p coronary stenting.  Pt states pain has improved since arrival and now 4/10 from 10/10 prior to arrival

## 2021-01-03 NOTE — H&P ADULT - HISTORY OF PRESENT ILLNESS
54 y/o  Female who was in her USOH until couple of days ago when she started having nausea and vomiting without fever or abdomin pain or diarrhea, started having some atypical CP described as indigestion with subsequent progression to substernal CP. She presented to Urgent care for COVID testing but was sent since no capacity and had anxiety attack   Presented to [facility] ED and had evolving EKG with pathologic Q in V1, ST depressions in III, AVF. Troponin was 1.   Patient brought to cath lab for STEMI, now s/p [facility] which showed reduced EF 40-45% w/ inferior wall hypokinesis, mCirc 80% Awais grade 3 no intervention, dRCA 100% w/ successful thrombectomy/ PCI x 1 2.5 x 12 mm KALEY TATY. Cangrelor x 2 hours.   Admitted to ICU for further monitoring

## 2021-01-03 NOTE — ED STATDOCS - PROGRESS NOTE DETAILS
Mercedes LOZADA for ED attending, Dr. Mesa. Pt evaluated, PMHx of HTN and COPD, smoker, c/o chest pressure.   EKG demonstrates 1 mm ST elevation and v3 with anterior reciprocal change. Code STEMI called  and handed off to Dr. Major.

## 2021-01-03 NOTE — ED ADULT NURSE NOTE - ED STAT RN HANDOFF DETAILS
pt placed on transport monitor, VSS and in no apparent distress. pt transported to cath lab with cardiology PA.

## 2021-01-03 NOTE — H&P ADULT - NSHPPHYSICALEXAM_GEN_ALL_CORE
ICU Vital Signs Last 24 Hrs  T(C): 37.4 (03 Jan 2021 10:14), Max: 37.4 (03 Jan 2021 10:14)  T(F): 99.3 (03 Jan 2021 10:14), Max: 99.3 (03 Jan 2021 10:14)  HR: 69 (03 Jan 2021 11:00) (69 - 85)  BP: 132/92 (03 Jan 2021 11:00) (132/92 - 140/92)  BP(mean): --  ABP: --  ABP(mean): --  RR: 20 (03 Jan 2021 11:00) (16 - 22)  SpO2: 100% (03 Jan 2021 11:00) (96% - 100%)  General: minimal distress since CP and SOB as well as hungry   Neuro: AAO*3, No motor, sensory, or cranial nerve deficit  HEENT: Pupils equal, reactive to light, Oral mucosa moist  PULM: Clear to auscultation bilaterally, no significant adventitious breath sounds   CVS: Regular rhythm and controlled rate, no murmurs, rubs, or gallops  ABD: Soft, nondistended, nontender, normoactive bowel sounds, no CVA tenderness  EXT: No b/l LE edema, nontender with pedal pulse palpable   SKIN: Warm and well perfused, no acute rashes

## 2021-01-03 NOTE — CONSULT NOTE ADULT - ATTENDING COMMENTS
55F chronic active smoker/COPD, anxiety and with extensive family h/o premature CAD with chest pain/nausea/vomiting since last night now p/w acute inferior wall STEMI with high thrombus burden, also with mLCx 80%  -continue DAPT with ASA/ticagrelor, if insurance not able to cover then use for 1 month then switch to clopidogrel  -TTE pending  -stage PCI of LCx prior to discharge vs. electively as outpatient  -smoking cessation strongly encouraged        Octaviano Diaz DO, [facility]  Faculty Non-Invasive Cardiologist  [phone]

## 2021-01-03 NOTE — CONSULT NOTE ADULT - ASSESSMENT
This is a 55 yr old woman, current active 1PPD smoker, with PMH of COPD, generalized anxiety, asthma, lung nodule, that presented with 1 day history of midsternal chest pain associated with nausea and vomiting. Chest pain started yesterday and progressively got worse and never really went away, today it significantly got worse after being denied a covid test at an urgent care center which triggered an anxiety attack. Upon arrival to ED pt had evolving EKG with pathologic Q in V1, ST depressions in III, AVF. Troponin was 1, Patient brought to cath lab for STEMI, now s/p LHC which showed reduced EF 40-45% w/ inferior wall hypokinesis, mCirc 80% Awais grade 3 no intervention, dRCA 100% w/ successful thrombectomy/ PCI x 1 2.5 x 12 mm KALEY TATY. Cangrelor x 2 hours, patient is admitted to CTICU.     STEMI, Unstable Angina CCS4  - s/p LHC x1 TATY to RCA  - outpatient eval for possible return for staged PCI of mCIRC 80% lesion (AWAIS grade 3)   - Cangrelor infusion x 2 hours   - start ASA 81mg Daily, Brilinta 90mg BID, Lipitor 80mg daily, metoprolol 25mg BID  - EKG upon arrival to floor  - EKG/CBC/BMP/HgbA1c/Lipid Panel in AM  - Smoking cessation heavily reinforced  - Diet/lifestyle modifications and medication compliance heavily reinforced   - EKG upon complaint of chest pain   - Admit to CTICU   - if stable downgrade to 4 [place] in AM   - groin check in AM   - continuous telemetry monitoring     Ischemic CM/HFrEF  - EF 40-45% new onset   - inferior wall hypokinesis   - check echo re-eval EF 1/5/21  - start losartan 25mg daily   - monitor for s/s of volume overload   - strict I/O, daily weights  - dash diet     S/P Coronary Angiogram   - Patient educated about groin site care, signs and symptoms of complication post procedure, and plan of care moving forward. Patient verbalized understanding with teach-back.   - cardiac rehab info provided/referral and communication to cardiac rehab completed     Dispo   - STEMI Admission  - 72 Hour admission  - [facility] to follow patient   - for questions or concerns please call [phone] (Cardiology NP/PA)   - Sign out given to ICU PA

## 2021-01-03 NOTE — H&P ADULT - NSHPSOCIALHISTORY_GEN_ALL_CORE
Smokes 5 cigarettes per day for > 30 years   Occasional Wine with friends   Denied any recreational substances   Denied recent travel   On disability   Lives alone   Brother as NOK and HCP

## 2021-01-03 NOTE — CONSULT NOTE ADULT - SUBJECTIVE AND OBJECTIVE BOX
[facility]                          [address] [place] [address]                       Phone: [phone]. Fax:[phone]                      ________________________________________________    HPI: This is a 55 yr old woman, current active 1PPD smoker, with PMH of COPD, generalized anxiety, asthma, lung nodule, that presented with 1 day history of midsternal chest pain associated with nausea and vomiting. Chest pain started yesterday and progressively got worse and never really went away, today it significantly got worse after being denied a covid test at an urgent care center which triggered an anxiety attack. Upon arrival to ED pt had evolving EKG with pathologic Q in V1, ST depressions in III, AVF. Troponin was 1, Patient brought to cath lab for STEMI, now s/p LHC which showed reduced EF 40-45% w/ inferior wall hypokinesis, mCirc 80% Awais grade 3 no intervention, dRCA 100% w/ successful thrombectomy/ PCI x 1 2.5 x 12 mm KALEY TATY. Cangrelor x 2 hours, patient is admitted to CTICU.   ,    ROS: All review of systems negative unless indicated otherwise below.                          LAB RESULTS                   COMPLETE BLOOD COUNT( 03 Jan 2021 10:39 )                            14.2 g/dL  17.70 K/uL<H> )---------------( 394 K/uL                        43.3 %      Automated Differential     Auto Basophil # - 0.08 K/uL  Auto Basophil % - 0.5 %  Auto Eosinophil # - 0.12 K/uL  Auto Eosinophil % - 0.7 %  Auto Immature Granulocyte # - X      Auto Immature Granulocyte % - 0.5 %  Auto Lymphocyte # - 1.63 K/uL  Auto Lymphocyte % - 9.2 %<L>  Auto Monocyte # - 1.32 K/uL<H>  Auto Monocyte % - 7.5 %  Auto Neutrophil # - 14.46 K/uL<H>  Auto Neutrophil % - 81.6 %<H>                                  CHEMISTRY                 Basic Metabolic Panel (01-03-21 @ 10:39)    137  |  97<L>  |  15.0  ----------------------------<  145<H>  3.0<L>   |  25.0  |  0.64    Ca    9.8      03 Jan 2021 10:39                    Liver Functions (01-03-21 @ 10:39))  TPro  7.9  /  Alb  4.4  /  TBili  0.7  /  DBili  x   /  AST  171  /  ALT  36  /  AlkPhos  127     PT/INR/PTT ( 03 Jan 2021 10:39 )                        :                       :      13.7         :       54.9                  .        .                   .              .           .       1.19        .                                                                   Cardiac Enzymes   ( 03 Jan 2021 10:39 )  Troponin T  1.04<H>,  CPK  1080<H>, CKMB  X    , BNP X                              RADIOLOGY RESULTS: Personally visualized   Xray chest in ED - Preliminary personal report - clear lungs, normal heart size                          CARDIOLOGY RESULTS: Official Report/Preliminary Verbal Reports  CATH:   < from: Cardiac Cath Lab - Adult (01.03.21 @ 11:00) >  VENTRICLES: Analysis of regional contractile function demonstrated moderate  diaphragmatic hypokinesis and severe posterobasal hypokinesis. Global left  ventricular function was moderately depressed. EF estimated was 40 %.  CORONARY VESSELS: The coronary circulation is right dominant.  LM:   --  LM: Normal.  LAD:   --  LAD: Angiography showed mild atherosclerosis with no flow  limiting lesions.  CX:   --  Mid circumflex: There was a [id number] % stenosis. The lesion was  without evidence of thrombus. There was AWAIS grade 3 flow through the  vessel (brisk flow). This lesion is a likely culprit for the patient's  anginal symptoms.  RCA:   --  Distal RCA: There was a tubular 100 % stenosis. The lesion was  associated with a moderate filling defect consistent with thrombus. There  was AWAIS grade 0 flow through the vessel (no flow). This lesion is a  likely culprit for the patient's recent myocardial infarction. It appears  amenable to percutaneous intervention.  COMPLICATIONS: There were no complications.  INTERVENTIONAL RECOMMENDATIONS: Left heart catheterization demonstrated  severe disease of the LCX. The LAD has mild nonobstructive disease.  The RCA is occluded in the distal segment, just prior to the PDA and PLV  bifurcation.  The d LVEF is reduced at 40 - 45% with inferior wall hypokinesis.  Performed successful thrombectomy, PTCA and stenting to the RCA.  Very good results.  There is some distal thrombus noted in the PLV.  Cangrelar was started, to complete the infusion.  Start dual antiplatelet Rx.  Start BB and ACE/ARB Rx.  Discussed with the patient the importance of smoking cessation.  Plan for PCI to the LCX in 4-6 weeks providing she remains stable.  Prepared and signed by  Dawood Weaver MD  Signed 01/03/2021 11:56:11    < end of copied text >                          CARDIOLOGY REVIEW: Personally visualized and reviewed  Telemetry Last 24h: NSR 70s no ecotpy     HOME MEDICATIONS:  Advair Diskus 100 mcg-50 mcg inhalation powder: 1 puff(s) inhaled 2 times a day (12 Apr 2016 15:57)  lamoTRIgine 100 mg oral tablet, extended release: 1 tab(s) orally once a day (12 Apr 2016 15:57)  Paxil 40 mg oral tablet: 1 tab(s) orally once a day (12 Apr 2016 15:57)  Proventil HFA 90 mcg/inh inhalation aerosol: 2 puff(s) inhaled 4 times a day (12 Apr 2016 15:57)  Suboxone 8 mg-2 mg sublingual film: 2 each sublingual once a day (12 Apr 2016 15:57)  Xanax 1 mg oral tablet: 1 tab(s) orally 3 times a day, As Needed (12 Apr 2016 15:57)                             Current Admission Active Medications    ALPRAZolam 0.25 milliGRAM(s) Oral every 12 hours PRN Anxiety  atorvastatin 80 milliGRAM(s) Oral at bedtime  cangrelor Infusion 4 MICROgram(s)/kG/Min (70.8 mL/Hr) IV Continuous <Continuous>  lamoTRIgine 100 milliGRAM(s) Oral daily  losartan 25 milliGRAM(s) Oral daily  metoprolol tartrate 25 milliGRAM(s) Oral two times a day  nicotine - 21 mG/24Hr(s) Patch 1 patch Transdermal daily  ticagrelor 90 milliGRAM(s) Oral every 12 hours                        PHYSICAL EXAM:    Vital Signs Last 24 Hrs  T(C): 37.4 (03 Jan 2021 10:14), Max: 37.4 (03 Jan 2021 10:14)  T(F): 99.3 (03 Jan 2021 10:14), Max: 99.3 (03 Jan 2021 10:14)  HR: 69 (03 Jan 2021 11:00) (69 - 85)  BP: 132/92 (03 Jan 2021 11:00) (132/92 - 140/92)  BP(mean): --  RR: 20 (03 Jan 2021 11:00) (16 - 22)  SpO2: 100% (03 Jan 2021 11:00) (96% - 100%)    GENERAL: NAD  NECK: Supple, No JVD  NERVOUS SYSTEM:  Alert & Oriented X3, non focal neuro exam.   CHEST/LUNG: clear lungs, No rales, rhonchi, wheezing, or rubs  HEART: Regular rate and rhythm; s1 and s2 auscultated, No murmurs, rubs, or gallops  ABDOMEN: Soft, Nontender, Nondistended; Bowel sounds present and normoactive.   EXTREMITIES:  2+ Peripheral Pulses, No clubbing, cyanosis, or edema  CATH SITE: Right groin benign s/p cath s/p perclose.  No bleeding, no ecchymosis, no hematoma. Extremity Warm to touch, with palpable distal pulses, and brisk capillary refill.

## 2021-01-04 DIAGNOSIS — I21.3 ST ELEVATION (STEMI) MYOCARDIAL INFARCTION OF UNSPECIFIED SITE: ICD-10-CM

## 2021-01-04 LAB
A1C WITH ESTIMATED AVERAGE GLUCOSE RESULT: 4.8 % — SIGNIFICANT CHANGE UP (ref 4–5.6)
ANION GAP SERPL CALC-SCNC: 11 MMOL/L — SIGNIFICANT CHANGE UP (ref 5–17)
BUN SERPL-MCNC: 17 MG/DL — SIGNIFICANT CHANGE UP (ref 8–20)
CALCIUM SERPL-MCNC: 9 MG/DL — SIGNIFICANT CHANGE UP (ref 8.6–10.2)
CHLORIDE SERPL-SCNC: 104 MMOL/L — SIGNIFICANT CHANGE UP (ref 98–107)
CHOLEST SERPL-MCNC: 202 MG/DL — HIGH
CO2 SERPL-SCNC: 23 MMOL/L — SIGNIFICANT CHANGE UP (ref 22–29)
CREAT SERPL-MCNC: 0.59 MG/DL — SIGNIFICANT CHANGE UP (ref 0.5–1.3)
ESTIMATED AVERAGE GLUCOSE: 91 MG/DL — SIGNIFICANT CHANGE UP (ref 68–114)
GLUCOSE SERPL-MCNC: 117 MG/DL — HIGH (ref 70–99)
HCT VFR BLD CALC: 36.9 % — SIGNIFICANT CHANGE UP (ref 34.5–45)
HCV AB S/CO SERPL IA: 0.09 S/CO — SIGNIFICANT CHANGE UP (ref 0–0.99)
HCV AB SERPL-IMP: SIGNIFICANT CHANGE UP
HDLC SERPL-MCNC: 59 MG/DL — SIGNIFICANT CHANGE UP
HGB BLD-MCNC: 11.7 G/DL — SIGNIFICANT CHANGE UP (ref 11.5–15.5)
LIPID PNL WITH DIRECT LDL SERPL: 120 MG/DL — HIGH
MCHC RBC-ENTMCNC: 30.1 PG — SIGNIFICANT CHANGE UP (ref 27–34)
MCHC RBC-ENTMCNC: 31.7 GM/DL — LOW (ref 32–36)
MCV RBC AUTO: 94.9 FL — SIGNIFICANT CHANGE UP (ref 80–100)
NON HDL CHOLESTEROL: 143 MG/DL — HIGH
NT-PROBNP SERPL-SCNC: 1140 PG/ML — HIGH (ref 0–300)
PLATELET # BLD AUTO: 316 K/UL — SIGNIFICANT CHANGE UP (ref 150–400)
POTASSIUM SERPL-MCNC: 3.5 MMOL/L — SIGNIFICANT CHANGE UP (ref 3.5–5.3)
POTASSIUM SERPL-SCNC: 3.5 MMOL/L — SIGNIFICANT CHANGE UP (ref 3.5–5.3)
RAPID RVP RESULT: SIGNIFICANT CHANGE UP
RBC # BLD: 3.89 M/UL — SIGNIFICANT CHANGE UP (ref 3.8–5.2)
RBC # FLD: 13.8 % — SIGNIFICANT CHANGE UP (ref 10.3–14.5)
SARS-COV-2 IGG SERPL QL IA: NEGATIVE — SIGNIFICANT CHANGE UP
SARS-COV-2 IGM SERPL IA-ACNC: 0.07 INDEX — SIGNIFICANT CHANGE UP
SARS-COV-2 RNA SPEC QL NAA+PROBE: SIGNIFICANT CHANGE UP
SODIUM SERPL-SCNC: 138 MMOL/L — SIGNIFICANT CHANGE UP (ref 135–145)
TRIGL SERPL-MCNC: 116 MG/DL — SIGNIFICANT CHANGE UP
WBC # BLD: 13.54 K/UL — HIGH (ref 3.8–10.5)
WBC # FLD AUTO: 13.54 K/UL — HIGH (ref 3.8–10.5)

## 2021-01-04 PROCEDURE — 99233 SBSQ HOSP IP/OBS HIGH 50: CPT

## 2021-01-04 PROCEDURE — 93306 TTE W/DOPPLER COMPLETE: CPT | Mod: 26

## 2021-01-04 PROCEDURE — 99232 SBSQ HOSP IP/OBS MODERATE 35: CPT

## 2021-01-04 RX ORDER — LOPERAMIDE HCL 2 MG
2 TABLET ORAL ONCE
Refills: 0 | Status: COMPLETED | OUTPATIENT
Start: 2021-01-04 | End: 2021-01-04

## 2021-01-04 RX ADMIN — TICAGRELOR 90 MILLIGRAM(S): 90 TABLET ORAL at 17:23

## 2021-01-04 RX ADMIN — Medication 25 MILLIGRAM(S): at 17:23

## 2021-01-04 RX ADMIN — Medication 0.25 MILLIGRAM(S): at 20:52

## 2021-01-04 RX ADMIN — Medication 2 MILLIGRAM(S): at 14:51

## 2021-01-04 RX ADMIN — Medication 3 MILLILITER(S): at 16:05

## 2021-01-04 RX ADMIN — Medication 25 MILLIGRAM(S): at 05:26

## 2021-01-04 RX ADMIN — Medication 3 MILLILITER(S): at 09:19

## 2021-01-04 RX ADMIN — TICAGRELOR 90 MILLIGRAM(S): 90 TABLET ORAL at 05:26

## 2021-01-04 RX ADMIN — HEPARIN SODIUM 5000 UNIT(S): 5000 INJECTION INTRAVENOUS; SUBCUTANEOUS at 08:32

## 2021-01-04 RX ADMIN — Medication 81 MILLIGRAM(S): at 14:18

## 2021-01-04 RX ADMIN — HEPARIN SODIUM 5000 UNIT(S): 5000 INJECTION INTRAVENOUS; SUBCUTANEOUS at 20:52

## 2021-01-04 RX ADMIN — LOSARTAN POTASSIUM 25 MILLIGRAM(S): 100 TABLET, FILM COATED ORAL at 05:26

## 2021-01-04 RX ADMIN — CHLORHEXIDINE GLUCONATE 1 APPLICATION(S): 213 SOLUTION TOPICAL at 05:28

## 2021-01-04 RX ADMIN — HEPARIN SODIUM 5000 UNIT(S): 5000 INJECTION INTRAVENOUS; SUBCUTANEOUS at 14:18

## 2021-01-04 RX ADMIN — HEPARIN SODIUM 5000 UNIT(S): 5000 INJECTION INTRAVENOUS; SUBCUTANEOUS at 01:01

## 2021-01-04 RX ADMIN — Medication 0.25 MILLIGRAM(S): at 09:32

## 2021-01-04 RX ADMIN — ATORVASTATIN CALCIUM 80 MILLIGRAM(S): 80 TABLET, FILM COATED ORAL at 20:40

## 2021-01-04 RX ADMIN — Medication 3 MILLILITER(S): at 21:12

## 2021-01-04 RX ADMIN — LAMOTRIGINE 100 MILLIGRAM(S): 25 TABLET, ORALLY DISINTEGRATING ORAL at 14:18

## 2021-01-04 NOTE — PROGRESS NOTE ADULT - ASSESSMENT
54 y/o F, active smoker/drinker, with COPD, asthma, lung nodule, CAD subsequent STEMI now s/p dRCA stent placement with Dr. Weaver

## 2021-01-04 NOTE — PROGRESS NOTE ADULT - SUBJECTIVE AND OBJECTIVE BOX
Patient is a 55y old  Female who presents with a chief complaint of STEMI (04 Jan 2021 12:00)      BRIEF HOSPITAL COURSE: 56 y/o  Female who was in her USOH until couple of days ago when she started having nausea and vomiting without fever or abdomin pain or diarrhea, started having some atypical CP described as indigestion with subsequent progression to substernal CP. She presented to Urgent care for COVID testing but was sent since no capacity and had anxiety attack   Presented to [facility] ED and had evolving EKG with pathologic Q in V1, ST depressions in III, AVF. Troponin was 1.   Patient brought to cath lab for STEMI, now s/p C which showed reduced EF 40-45% w/ inferior wall hypokinesis, mCirc 80% Awais grade 3 no intervention, dRCA 100% w/ successful thrombectomy/ PCI x 1 2.5 x 12 mm KALEY TATY. Cangrelor x 2 hours. Admitted to ICU for further monitoring    Events last 24 hours: Patient remains stable overnight. No chest pain or acute events.     PAST MEDICAL & SURGICAL HISTORY:  HTN (hypertension)    Chronic obstructive pulmonary disease, unspecified COPD type    Asthma    Anxiety    Clavicle fracture      Review of Systems:  CONSTITUTIONAL: No fever, chills, or fatigue.  EYES: No eye pain, visual disturbances, or discharge.  ENMT:  No difficulty hearing, tinnitus, or vertigo. No sinus or throat pain.  NECK: No pain or stiffness.  RESPIRATORY: No shortness of breath, cough, or wheezing.  CARDIOVASCULAR: No chest pain, palpitations, dizziness, or leg swelling.  GASTROINTESTINAL: No abdominal or epigastric pain. No nausea, vomiting, diarrhea, or constipation. No hematemesis, melena, or hematochezia.  GENITOURINARY: No dysuria, increased frequency, hematuria, or incontinence.  NEUROLOGICAL: No headaches, memory loss, loss of strength, numbness, or tremors.  SKIN: No itching, burning, rashes, or lesions.  MUSCULOSKELETAL: No joint pain or swelling. No muscle, back, or extremity pain.  PSYCHIATRIC: No depression, anxiety, mood swings, or difficulty sleeping.    Medications:    losartan 25 milliGRAM(s) Oral daily  metoprolol tartrate 25 milliGRAM(s) Oral two times a day    ALBUTerol    90 MICROgram(s) HFA Inhaler 2 Puff(s) Inhalation every 4 hours PRN  albuterol/ipratropium for Nebulization 3 milliLiter(s) Nebulizer every 6 hours  budesonide 160 MICROgram(s)/formoterol 4.5 MICROgram(s) Inhaler 2 Puff(s) Inhalation two times a day    ALPRAZolam 0.25 milliGRAM(s) Oral every 12 hours PRN  lamoTRIgine 100 milliGRAM(s) Oral daily      aspirin  chewable 81 milliGRAM(s) Oral daily  heparin   Injectable 5000 Unit(s) SubCutaneous every 8 hours  ticagrelor 90 milliGRAM(s) Oral every 12 hours        atorvastatin 80 milliGRAM(s) Oral at bedtime        chlorhexidine 4% Liquid 1 Application(s) Topical <User Schedule>    nicotine - 21 mG/24Hr(s) Patch 1 patch Transdermal daily          ICU Vital Signs Last 24 Hrs  T(C): 36.6 (04 Jan 2021 07:00), Max: 37.4 (03 Jan 2021 15:52)  T(F): 97.8 (04 Jan 2021 07:00), Max: 99.4 (03 Jan 2021 15:52)  HR: 78 (04 Jan 2021 12:00) (60 - 84)  BP: 103/64 (04 Jan 2021 12:00) (84/52 - 130/80)  BP(mean): 78 (04 Jan 2021 12:00) (61 - 100)  ABP: --  ABP(mean): --  RR: 18 (04 Jan 2021 12:00) (14 - 24)  SpO2: 98% (04 Jan 2021 12:00) (94% - 100%)          I&O's Detail    03 Jan 2021 07:01  -  04 Jan 2021 07:00  --------------------------------------------------------  IN:    IV PiggyBack: 50 mL    IV PiggyBack: 50 mL    Oral Fluid: 480 mL  Total IN: 580 mL    OUT:    Voided (mL): 250 mL  Total OUT: 250 mL    Total NET: 330 mL      04 Jan 2021 07:01  -  04 Jan 2021 13:03  --------------------------------------------------------  IN:    Oral Fluid: 480 mL  Total IN: 480 mL    OUT:  Total OUT: 0 mL    Total NET: 480 mL          LABS:                        11.7   13.54 )-----------( 316      ( 04 Jan 2021 03:02 )             36.9     01-04    138  |  104  |  17.0  ----------------------------<  117<H>  3.5   |  23.0  |  0.59    Ca    9.0      04 Jan 2021 03:02  Phos  2.7     01-03  Mg     1.6     01-03    TPro  7.0  /  Alb  3.9  /  TBili  0.6  /  DBili  x   /  AST  172<H>  /  ALT  33<H>  /  AlkPhos  115  01-03      CARDIAC MARKERS ( 03 Jan 2021 13:43 )  x     / x     / 1104 U/L / x     / 91.5 ng/mL  CARDIAC MARKERS ( 03 Jan 2021 10:39 )  x     / 1.04 ng/mL / 1080 U/L / x     / 75.3 ng/mL      CAPILLARY BLOOD GLUCOSE        PT/INR - ( 03 Jan 2021 10:39 )   PT: 13.7 sec;   INR: 1.19 ratio         PTT - ( 03 Jan 2021 10:39 )  PTT:54.9 sec    CULTURES:  Rapid RVP Result: NotDetec (01-04 @ 01:13)      Physical Examination:      General: Well appearing, lying in bed in NAD.      HEENT: Pupils equal, reactive to light. Symmetric. No scleral icterus or injection.    PULM: Clear to auscultation B/L. No wheezes, rales, or rhonchi.     NECK: Supple, no lymphadenopathy, trachea midline.    CVS: Regular rate and rhythm, no murmurs, +s1/s2.    ABD: Soft, nondistended, nontender, normoactive bowel sounds.    EXT: No edema, nontender.    SKIN: Warm and well perfused, no rashes noted.    NEURO: Alert, oriented, interactive, nonfocal.

## 2021-01-04 NOTE — PROGRESS NOTE ADULT - SUBJECTIVE AND OBJECTIVE BOX
Department of Cardiology                                                                  [facility]/[place] [address] [facility] [place] [address]                                                                   Telephone: [phone]. Fax:[phone]    SUBJECTIVE:  "I'm not feeling as anxious; when can I go home".  Narrative: 54 y/o  F, active 1PPD smoker, active EtOH, PMHx of anxiety, COPD (known lung nodule?), asthma, now CAD admitted this hospitalization with unstable angina (CCSC IV) and STEMI now s/p left heart cardiac cath via RFA (+angioseal closure; no site complications) PCI/ POBA/ coronary thrombectomy with TATY placement to dRCA (resolute susan 2.5 x 12mm; EF 40% inf wall hypokinesis) with Dr. Weaver. Pt currently ambulating in room (boarding in CTICU); denies chest pain, palpitations, DUNBAR/SOB. Pt is post cangrelor gtt; no bleeding noted.   Will need to return for staged PCI and stent placement to left circumflex artery.     Of note, pt was scheduled to see Dr. Bear, Cardiologist per recommended and she never followed up with her appt 5/2018.  	  MEDICATIONS:  losartan 25 milliGRAM(s) Oral daily  metoprolol tartrate 25 milliGRAM(s) Oral two times a day  ALBUTerol    90 MICROgram(s) HFA Inhaler 2 Puff(s) Inhalation every 4 hours PRN  albuterol/ipratropium for Nebulization 3 milliLiter(s) Nebulizer every 6 hours  budesonide 160 MICROgram(s)/formoterol 4.5 MICROgram(s) Inhaler 2 Puff(s) Inhalation two times a day  ALPRAZolam 0.25 milliGRAM(s) Oral every 12 hours PRN  lamoTRIgine 100 milliGRAM(s) Oral daily  atorvastatin 80 milliGRAM(s) Oral at bedtime  aspirin  chewable 81 milliGRAM(s) Oral daily  chlorhexidine 4% Liquid 1 Application(s) Topical <User Schedule>  heparin   Injectable 5000 Unit(s) SubCutaneous every 8 hours  ticagrelor 90 milliGRAM(s) Oral every 12 hours    PHYSICAL EXAM:  T(C): 36.6 (01-04-21 @ 07:00), Max: 37.4 (01-03-21 @ 15:52)  HR: 69 (01-04-21 @ 11:00) (60 - 84)  BP: 102/66 (01-04-21 @ 11:00) (84/52 - 130/80)  RR: 24 (01-04-21 @ 11:00) (14 - 24)  SpO2: 98% (01-04-21 @ 11:00) (94% - 100%)  Wt(kg): --    I&O's Summary    03 Jan 2021 07:01  -  04 Jan 2021 07:00  --------------------------------------------------------  IN: 580 mL / OUT: 250 mL / NET: 330 mL    04 Jan 2021 07:01  -  04 Jan 2021 12:00  --------------------------------------------------------  IN: 480 mL / OUT: 0 mL / NET: 480 mL       Appearance: Normal	  HEENT:   Normal oral mucosa, PERRL, EOMI	  Lymphatic: No lymphadenopathy  Cardiovascular: Normal S1 S2, No JVD, No murmurs, No edema  Respiratory: Lungs clear to auscultation	  Psychiatry: A & O x 3, Mood & affect appropriate  Gastrointestinal:  Soft, Non-tender, + BS	  Skin: No rashes, No ecchymoses, No cyanosis  Neurologic: Non-focal  Extremities: Normal range of motion, No clubbing, cyanosis or edema  Vascular: Peripheral pulses palpable 2+ bilaterally  Right groin: no hematoma, soft, non tender, site C/D/i    TELEMETRY: reviewed by me; no overnight events        [ ]  Catheterization:  < from: Cardiac Cath Lab - Adult (01.03.21 @ 11:00) >  PROCEDURE:  --  Left heart catheterization with ventriculography.  --  Left coronary angiography.  --  Right coronary angiography.  --  Sonosite.  --  Coronary Thrombectomy.  --  Intervention on distal RCA: drug-eluting stent, balloon angioplasty,  thrombectomy.  TECHNIQUE: Cardiac catheterization performed emergently. Coronary  intervention performed emergently.  Local anesthetic given.Right femoral artery access. Left heart  catheterization. Ventriculography was performed. Left coronary artery  angiography. The vessel was injected utilizing a catheter. Right coronary  artery angiography. The vessel was injected utilizing a catheter.  Sonosite. RADIATION EXPOSURE: 1 min. A successful drug-eluting stent with  balloon angioplasty and thrombectomy was performed on the 100 % lesion in  the distal RCA. Following intervention there was an excellent angiographic  appearance with a 1 %residual stenosis. According to the ACC/AHA  classification system, this lesion was a type B1 lesion. The residual  lesion demonstrated a moderate filling defect consistent with thrombus.  There was KELY 0 flow before the procedure and KELY 3 flow after the  procedure. There was no dissection. Balloon angioplasty was performed,  using a EUPHORA 2.5MM X 15MM balloon, with 1 inflations and a maximum  inflation pressure of 12 liz. During the procedure, the previous guider  was changed for a 6F JR4.0LAUNCHER guider, and a new COUGAR 190 WIRE wire  was advanced across the lesion. A SUSAN 2.50 X 12MM drug-eluting stent was  placed across the lesion and deployed at a maximum inflation pressure of  20 liz. Coronary Thrombectomy.  CONTRAST GIVEN: Omnipaque 54 ml. Omnipaque 77 ml.  MEDICATIONS GIVEN: Midazolam, 2 mg, IV. Fentanyl, 50 mcg, IV. Potassium  chloride, 10 mEq, IV. Nicardipine (Cardene), 250 mcg, intracoronary.  Heparin, 3000 units, IV. 1% Lidocaine, 10 ml, subcutaneously. Cangrelor,  infusion rate of 72 mcg/kg/min, IV. Kengreal, 9 mcg/kg, IV.  VENTRICLES: Analysis of regional contractile function demonstrated moderate  diaphragmatic hypokinesis and severe posterobasal hypokinesis. Global left  ventricular function was moderately depressed. EF estimated was 40 %.  CORONARY VESSELS: The coronary circulation is right dominant.  LM:   --  LM: Normal.  LAD:   --  LAD: Angiography showed mild atherosclerosis with no flow  limiting lesions.  CX:   --  Mid circumflex: There was a [id number] % stenosis. The lesion was  without evidence of thrombus. There was KELY grade 3 flow through the  vessel (brisk flow). This lesion is a likely culprit for the patient's  anginal symptoms.  RCA:   --  Distal RCA: There was a tubular 100 % stenosis. The lesion was  associated with a moderate filling defect consistent with thrombus. There  was KELY grade 0 flow through the vessel (no flow). This lesion is a  likely culprit for the patient's recent myocardial infarction. It appears  amenable to percutaneous intervention.  COMPLICATIONS: There were no complications.  INTERVENTIONAL RECOMMENDATIONS: Left heart catheterization demonstrated  severe disease of the LCX. The LAD has mild nonobstructive disease.  The RCA is occluded in the distal segment, just prior to the PDA and PLV  bifurcation.  The d LVEF is reduced at 40 - 45% with inferior wall hypokinesis.  Performed successful thrombectomy, PTCA and stenting to the RCA.  Very good results.  There is some distal thrombus noted in the PLV.  Cangrelar was started, to complete the infusion.  Start dual antiplatelet Rx.  Start BB and ACE/ARB Rx.  Discussed with the patient the importance of smoking cessation.  Plan for PCI to the LCX in 4-6 weeks providing she remains stable.  Prepared and signed by  Dawood Weaver MD        LABS:	                         11.7   13.54 )-----------( 316      ( 04 Jan 2021 03:02 )             36.9     01-04    138  |  104  |  17.0  ----------------------------<  117<H>  3.5   |  23.0  |  0.59    Ca    9.0      04 Jan 2021 03:02  Phos  2.7     01-03  Mg     1.6     01-03    TPro  7.0  /  Alb  3.9  /  TBili  0.6  /  DBili  x   /  AST  172<H>  /  ALT  33<H>  /  AlkPhos  115  01-03    proBNP: Serum Pro-Brain Natriuretic Peptide: 1140 pg/mL (01-04 @ 03:02)      TSH: Thyroid Stimulating Hormone, Serum: 0.55 uIU/mL (01-03 @ 13:43)

## 2021-01-04 NOTE — PROGRESS NOTE ADULT - PROBLEM SELECTOR PLAN 1
-dual anti platelet therapy with aspirin and brilinta for stent protection  -high intensity statin; atorvastain 80mg  -BB; metoprolol 25mg BID  -ARB; losartan 25mg daily  -smoking and alcohol cessation discussed -dual anti platelet therapy with aspirin and brilinta for stent protection  -high intensity statin; atorvastatin 80mg  -BB; metoprolol 25mg BID  -ARB; losartan 25mg daily  -Cardiac diet and exercise modifications encouraged  -smoking and alcohol cessation discussed  -c/w tele montitoring  -discussed at great length medication compliance and follow ups  -will need to return to cath lab for staged procedure of circumflex artery  -[facility] following during hospitalization -dual anti platelet therapy with aspirin and brilinta for stent protection  -high intensity statin; atorvastatin 80mg  -BB; metoprolol 25mg BID  -ARB; losartan 25mg daily  -Cardiac diet and exercise modifications encouraged  -smoking and alcohol cessation discussed  -c/w tele montitoring  -discussed at great length medication compliance and follow ups  -will need to return to cath lab for staged procedure of circumflex artery  -[facility] following during hospitalization  -likely downgrade to telemetry floor for further monitoring and possible DC tomorrow if no events

## 2021-01-04 NOTE — PROGRESS NOTE ADULT - ASSESSMENT
Pt is a 56yo F presenting with a STEMI, underwent PCI with TATY to RCA.    1: Acute CAD with s/p distal RCA thrombectomy with angioplasty with one TATY with staged LCx CAD intervention   2: Hypokalemia   3: COPD with active tobacco use d/o   4: HfrEF, newly discovered   5: Anxiety and depression     - post STEMI and PCI management  - dual antiplatelet, ASA, Brilinta   - high dose statin  - ARB, BB  - Smoking cessation encouraged  - Telemetry monitoring  - Importance of cardiology f/u encouraged  - Patient to be downgraded from ICU to Tele today  - To be d/c tomorrow assuming no acute events as per cardiology

## 2021-01-04 NOTE — PROGRESS NOTE ADULT - SUBJECTIVE AND OBJECTIVE BOX
Called to bedside by RN, patient with hematoma in right groin. Pressure applied, hematoma compressed out. Patient placed on bedrest x 2 hours, HOB < 30 degrees with leg straight. No bleeding. no bruit. Now soft. RN to monitor for changes.

## 2021-01-04 NOTE — PROGRESS NOTE ADULT - SUBJECTIVE AND OBJECTIVE BOX
YORDY ARDON  ----------------------------------------  The patient was seen and evaluated for STEMI. Offers no complaints. Denied chest pain or dyspnea.    Vital Signs Last 24 Hrs  T(C): 37.4 (04 Jan 2021 12:00), Max: 37.4 (03 Jan 2021 15:52)  T(F): 99.3 (04 Jan 2021 12:00), Max: 99.4 (03 Jan 2021 15:52)  HR: 78 (04 Jan 2021 12:00) (60 - 84)  BP: 103/64 (04 Jan 2021 12:00) (84/52 - 130/80)  BP(mean): 78 (04 Jan 2021 12:00) (61 - 100)  RR: 18 (04 Jan 2021 12:00) (14 - 24)  SpO2: 98% (04 Jan 2021 12:00) (94% - 100%)    PHYSICAL EXAMINATION:  ----------------------------------------  General appearance: No acute distress, Awake, Alert  HEENT: Normocephalic, Atraumatic, Conjunctiva clear, EOMI  Neck: Supple, No JVD, No tenderness  Lungs: Breath sound equal bilaterally, No wheezes, No rales  Cardiovascular: S1S2, Regular rhythm  Abdomen: Soft, Nontender, Nondistended, No guarding/rebound, Positive bowel sounds  Extremities: No clubbing, No cyanosis, No edema, No calf tenderness  Neuro: Strength equal bilaterally, No tremors  Psychiatric: Appropriate mood, Normal affect    LABORATORY STUDIES:  ----------------------------------------             11.7   13.54 )-----------( 316      ( 04 Jan 2021 03:02 )             36.9     01-04    138  |  104  |  17.0  ----------------------------<  117<H>  3.5   |  23.0  |  0.59    Ca    9.0      04 Jan 2021 03:02  Phos  2.7     01-03  Mg     1.6     01-03    TPro  7.0  /  Alb  3.9  /  TBili  0.6  /  DBili  x   /  AST  172<H>  /  ALT  33<H>  /  AlkPhos  115  01-03    LIVER FUNCTIONS - ( 03 Jan 2021 13:43 )  Alb: 3.9 g/dL / Pro: 7.0 g/dL / ALK PHOS: 115 U/L / ALT: 33 U/L / AST: 172 U/L / GGT: x           PT/INR - ( 03 Jan 2021 10:39 )   PT: 13.7 sec;   INR: 1.19 ratio    PTT - ( 03 Jan 2021 10:39 )  PTT:54.9 sec    CARDIAC MARKERS ( 03 Jan 2021 13:43 )  x     / x     / 1104 U/L / x     / 91.5 ng/mL  CARDIAC MARKERS ( 03 Jan 2021 10:39 )  x     / 1.04 ng/mL / 1080 U/L / x     / 75.3 ng/mL    MEDICATIONS  (STANDING):  albuterol/ipratropium for Nebulization 3 milliLiter(s) Nebulizer every 6 hours  aspirin  chewable 81 milliGRAM(s) Oral daily  atorvastatin 80 milliGRAM(s) Oral at bedtime  budesonide 160 MICROgram(s)/formoterol 4.5 MICROgram(s) Inhaler 2 Puff(s) Inhalation two times a day  chlorhexidine 4% Liquid 1 Application(s) Topical <User Schedule>  heparin   Injectable 5000 Unit(s) SubCutaneous every 8 hours  lamoTRIgine 100 milliGRAM(s) Oral daily  losartan 25 milliGRAM(s) Oral daily  metoprolol tartrate 25 milliGRAM(s) Oral two times a day  nicotine - 21 mG/24Hr(s) Patch 1 patch Transdermal daily  ticagrelor 90 milliGRAM(s) Oral every 12 hours    MEDICATIONS  (PRN):  ALBUTerol    90 MICROgram(s) HFA Inhaler 2 Puff(s) Inhalation every 4 hours PRN Shortness of Breath  ALPRAZolam 0.25 milliGRAM(s) Oral every 12 hours PRN Anxiety      ASSESSMENT / PLAN:  ----------------------------------------  55F with a history of COPD, hypertension, and anxiety who presented with chest discomfort and was noted to have an STEMI requiring cardiac catheterization and percutaneous coronary intervention to the right coronary artery.    STEMI / Coronary artery disease - Status post percutaneous coronary intervention. Discussed with Cardiology, will potentially need a staged procedure for the circumflex. On ticagrelor, aspirin, and atorvastatin.    COPD - No dyspnea or wheezing noted on examination. On budesonide. Inhaled bronchodilator therapy as needed.    Hypertension - Close blood pressure monitoring. On losartan and metoprolol.    Anxiety - To resume lamotrigine.    Leukocytosis - Afebrile. No obvious signs of infection. Improved from admission. YORDY ARDON  ----------------------------------------  The patient was seen and evaluated for STEMI. Offers no complaints. Denied chest pain or dyspnea.    Vital Signs Last 24 Hrs  T(C): 37.4 (04 Jan 2021 12:00), Max: 37.4 (03 Jan 2021 15:52)  T(F): 99.3 (04 Jan 2021 12:00), Max: 99.4 (03 Jan 2021 15:52)  HR: 78 (04 Jan 2021 12:00) (60 - 84)  BP: 103/64 (04 Jan 2021 12:00) (84/52 - 130/80)  BP(mean): 78 (04 Jan 2021 12:00) (61 - 100)  RR: 18 (04 Jan 2021 12:00) (14 - 24)  SpO2: 98% (04 Jan 2021 12:00) (94% - 100%)    PHYSICAL EXAMINATION:  ----------------------------------------  General appearance: No acute distress, Awake, Alert  HEENT: Normocephalic, Atraumatic, Conjunctiva clear, EOMI  Neck: Supple, No JVD, No tenderness  Lungs: Breath sound equal bilaterally, No wheezes, No rales  Cardiovascular: S1S2, Regular rhythm  Abdomen: Soft, Nontender, Nondistended, No guarding/rebound, Positive bowel sounds  Extremities: No clubbing, No cyanosis, No edema, No calf tenderness  Neuro: Strength equal bilaterally, No tremors  Psychiatric: Appropriate mood, Normal affect    LABORATORY STUDIES:  ----------------------------------------             11.7   13.54 )-----------( 316      ( 04 Jan 2021 03:02 )             36.9     01-04    138  |  104  |  17.0  ----------------------------<  117<H>  3.5   |  23.0  |  0.59    Ca    9.0      04 Jan 2021 03:02  Phos  2.7     01-03  Mg     1.6     01-03    TPro  7.0  /  Alb  3.9  /  TBili  0.6  /  DBili  x   /  AST  172<H>  /  ALT  33<H>  /  AlkPhos  115  01-03    LIVER FUNCTIONS - ( 03 Jan 2021 13:43 )  Alb: 3.9 g/dL / Pro: 7.0 g/dL / ALK PHOS: 115 U/L / ALT: 33 U/L / AST: 172 U/L / GGT: x           PT/INR - ( 03 Jan 2021 10:39 )   PT: 13.7 sec;   INR: 1.19 ratio    PTT - ( 03 Jan 2021 10:39 )  PTT:54.9 sec    CARDIAC MARKERS ( 03 Jan 2021 13:43 )  x     / x     / 1104 U/L / x     / 91.5 ng/mL  CARDIAC MARKERS ( 03 Jan 2021 10:39 )  x     / 1.04 ng/mL / 1080 U/L / x     / 75.3 ng/mL    MEDICATIONS  (STANDING):  albuterol/ipratropium for Nebulization 3 milliLiter(s) Nebulizer every 6 hours  aspirin  chewable 81 milliGRAM(s) Oral daily  atorvastatin 80 milliGRAM(s) Oral at bedtime  budesonide 160 MICROgram(s)/formoterol 4.5 MICROgram(s) Inhaler 2 Puff(s) Inhalation two times a day  chlorhexidine 4% Liquid 1 Application(s) Topical <User Schedule>  heparin   Injectable 5000 Unit(s) SubCutaneous every 8 hours  lamoTRIgine 100 milliGRAM(s) Oral daily  losartan 25 milliGRAM(s) Oral daily  metoprolol tartrate 25 milliGRAM(s) Oral two times a day  nicotine - 21 mG/24Hr(s) Patch 1 patch Transdermal daily  ticagrelor 90 milliGRAM(s) Oral every 12 hours    MEDICATIONS  (PRN):  ALBUTerol    90 MICROgram(s) HFA Inhaler 2 Puff(s) Inhalation every 4 hours PRN Shortness of Breath  ALPRAZolam 0.25 milliGRAM(s) Oral every 12 hours PRN Anxiety      ASSESSMENT / PLAN:  ----------------------------------------  55F with a history of COPD, hypertension, and anxiety who presented with chest discomfort and was noted to have an STEMI requiring cardiac catheterization and percutaneous coronary intervention to the right coronary artery.    STEMI / Coronary artery disease - Status post percutaneous coronary intervention. Discussed with Cardiology, will potentially need a staged procedure for the circumflex. On ticagrelor, aspirin, and atorvastatin.    COPD - No dyspnea or wheezing noted on examination. On budesonide. Inhaled bronchodilator therapy as needed.    Hypertension - Close blood pressure monitoring. On losartan and metoprolol.    Anxiety - On lamotrigine.    Leukocytosis - Afebrile. No obvious signs of infection. Improved from admission.

## 2021-01-05 LAB
ANION GAP SERPL CALC-SCNC: 11 MMOL/L — SIGNIFICANT CHANGE UP (ref 5–17)
BUN SERPL-MCNC: 19 MG/DL — SIGNIFICANT CHANGE UP (ref 8–20)
CALCIUM SERPL-MCNC: 8.8 MG/DL — SIGNIFICANT CHANGE UP (ref 8.6–10.2)
CHLORIDE SERPL-SCNC: 103 MMOL/L — SIGNIFICANT CHANGE UP (ref 98–107)
CO2 SERPL-SCNC: 24 MMOL/L — SIGNIFICANT CHANGE UP (ref 22–29)
CREAT SERPL-MCNC: 0.57 MG/DL — SIGNIFICANT CHANGE UP (ref 0.5–1.3)
GLUCOSE SERPL-MCNC: 105 MG/DL — HIGH (ref 70–99)
HCT VFR BLD CALC: 38.4 % — SIGNIFICANT CHANGE UP (ref 34.5–45)
HGB BLD-MCNC: 12 G/DL — SIGNIFICANT CHANGE UP (ref 11.5–15.5)
MCHC RBC-ENTMCNC: 30.2 PG — SIGNIFICANT CHANGE UP (ref 27–34)
MCHC RBC-ENTMCNC: 31.3 GM/DL — LOW (ref 32–36)
MCV RBC AUTO: 96.5 FL — SIGNIFICANT CHANGE UP (ref 80–100)
PLATELET # BLD AUTO: 311 K/UL — SIGNIFICANT CHANGE UP (ref 150–400)
POTASSIUM SERPL-MCNC: 3.6 MMOL/L — SIGNIFICANT CHANGE UP (ref 3.5–5.3)
POTASSIUM SERPL-SCNC: 3.6 MMOL/L — SIGNIFICANT CHANGE UP (ref 3.5–5.3)
RBC # BLD: 3.98 M/UL — SIGNIFICANT CHANGE UP (ref 3.8–5.2)
RBC # FLD: 13.4 % — SIGNIFICANT CHANGE UP (ref 10.3–14.5)
SODIUM SERPL-SCNC: 138 MMOL/L — SIGNIFICANT CHANGE UP (ref 135–145)
WBC # BLD: 9.77 K/UL — SIGNIFICANT CHANGE UP (ref 3.8–10.5)
WBC # FLD AUTO: 9.77 K/UL — SIGNIFICANT CHANGE UP (ref 3.8–10.5)

## 2021-01-05 PROCEDURE — 99232 SBSQ HOSP IP/OBS MODERATE 35: CPT

## 2021-01-05 PROCEDURE — 93010 ELECTROCARDIOGRAM REPORT: CPT

## 2021-01-05 PROCEDURE — 99233 SBSQ HOSP IP/OBS HIGH 50: CPT

## 2021-01-05 PROCEDURE — 99233 SBSQ HOSP IP/OBS HIGH 50: CPT | Mod: GC

## 2021-01-05 RX ORDER — NITROGLYCERIN 6.5 MG
0.4 CAPSULE, EXTENDED RELEASE ORAL
Refills: 0 | Status: DISCONTINUED | OUTPATIENT
Start: 2021-01-05 | End: 2021-01-07

## 2021-01-05 RX ADMIN — TICAGRELOR 90 MILLIGRAM(S): 90 TABLET ORAL at 17:50

## 2021-01-05 RX ADMIN — HEPARIN SODIUM 5000 UNIT(S): 5000 INJECTION INTRAVENOUS; SUBCUTANEOUS at 16:34

## 2021-01-05 RX ADMIN — TICAGRELOR 90 MILLIGRAM(S): 90 TABLET ORAL at 05:28

## 2021-01-05 RX ADMIN — ATORVASTATIN CALCIUM 80 MILLIGRAM(S): 80 TABLET, FILM COATED ORAL at 22:24

## 2021-01-05 RX ADMIN — Medication 0.25 MILLIGRAM(S): at 11:17

## 2021-01-05 RX ADMIN — HEPARIN SODIUM 5000 UNIT(S): 5000 INJECTION INTRAVENOUS; SUBCUTANEOUS at 05:28

## 2021-01-05 RX ADMIN — Medication 0.25 MILLIGRAM(S): at 22:23

## 2021-01-05 RX ADMIN — Medication 25 MILLIGRAM(S): at 17:50

## 2021-01-05 RX ADMIN — Medication 3 MILLILITER(S): at 21:11

## 2021-01-05 RX ADMIN — Medication 25 MILLIGRAM(S): at 05:28

## 2021-01-05 RX ADMIN — Medication 3 MILLILITER(S): at 07:59

## 2021-01-05 RX ADMIN — Medication 81 MILLIGRAM(S): at 11:17

## 2021-01-05 RX ADMIN — LOSARTAN POTASSIUM 25 MILLIGRAM(S): 100 TABLET, FILM COATED ORAL at 05:29

## 2021-01-05 RX ADMIN — LAMOTRIGINE 100 MILLIGRAM(S): 25 TABLET, ORALLY DISINTEGRATING ORAL at 11:17

## 2021-01-05 RX ADMIN — HEPARIN SODIUM 5000 UNIT(S): 5000 INJECTION INTRAVENOUS; SUBCUTANEOUS at 22:24

## 2021-01-05 NOTE — PROGRESS NOTE ADULT - SUBJECTIVE AND OBJECTIVE BOX
YORDY ARDON  ----------------------------------------  The patient was seen and evaluated for STEMI. Reported an episode of chest pain. Diarrhea resolved.    Vital Signs Last 24 Hrs  T(C): 36.8 (05 Jan 2021 10:00), Max: 37.7 (04 Jan 2021 20:37)  T(F): 98.3 (05 Jan 2021 10:00), Max: 99.8 (04 Jan 2021 20:37)  HR: 82 (05 Jan 2021 10:00) (63 - 82)  BP: 101/67 (05 Jan 2021 10:00) (94/60 - 120/73)  BP(mean): 92 (04 Jan 2021 19:00) (71 - 92)  RR: 18 (05 Jan 2021 10:00) (17 - 21)  SpO2: 96% (05 Jan 2021 10:00) (95% - 100%)    PHYSICAL EXAMINATION:  ----------------------------------------  General appearance: No acute distress, Awake, Alert  HEENT: Normocephalic, Atraumatic, Conjunctiva clear, EOMI  Neck: Supple, No JVD, No tenderness  Lungs: Breath sound equal bilaterally, No wheezes, No rales  Cardiovascular: S1S2, Regular rhythm  Abdomen: Soft, Nontender, Nondistended, No guarding/rebound, Positive bowel sounds  Extremities: No clubbing, No cyanosis, No edema, No calf tenderness  Neuro: Strength equal bilaterally, No tremors  Psychiatric: Appropriate mood, Normal affect    LABORATORY STUDIES:  ----------------------------------------             12.0   9.77  )-----------( 311      ( 05 Jan 2021 06:58 )             38.4     01-05    138  |  103  |  19.0  ----------------------------<  105<H>  3.6   |  24.0  |  0.57    Ca    8.8      05 Jan 2021 06:58    MEDICATIONS  (STANDING):  albuterol/ipratropium for Nebulization 3 milliLiter(s) Nebulizer every 6 hours  aspirin  chewable 81 milliGRAM(s) Oral daily  atorvastatin 80 milliGRAM(s) Oral at bedtime  budesonide 160 MICROgram(s)/formoterol 4.5 MICROgram(s) Inhaler 2 Puff(s) Inhalation two times a day  chlorhexidine 4% Liquid 1 Application(s) Topical <User Schedule>  heparin   Injectable 5000 Unit(s) SubCutaneous every 8 hours  lamoTRIgine 100 milliGRAM(s) Oral daily  losartan 25 milliGRAM(s) Oral daily  metoprolol tartrate 25 milliGRAM(s) Oral two times a day  nicotine - 21 mG/24Hr(s) Patch 1 patch Transdermal daily  ticagrelor 90 milliGRAM(s) Oral every 12 hours    MEDICATIONS  (PRN):  ALBUTerol    90 MICROgram(s) HFA Inhaler 2 Puff(s) Inhalation every 4 hours PRN Shortness of Breath  ALPRAZolam 0.25 milliGRAM(s) Oral every 12 hours PRN Anxiety  nitroglycerin     SubLingual 0.4 milliGRAM(s) SubLingual every 5 minutes PRN Chest Pain      ASSESSMENT / PLAN:  ----------------------------------------  55F with a history of COPD, hypertension, and anxiety who presented with chest discomfort and was noted to have an STEMI requiring cardiac catheterization and percutaneous coronary intervention to the right coronary artery.    STEMI / Coronary artery disease - Status post percutaneous coronary intervention to the right coronary artery. On ticagrelor, aspirin, and atorvastatin. Discussed with Cardiology, planned for cardiac catheterization tomorrow.    COPD - No dyspnea or wheezing noted on examination. On budesonide. Inhaled bronchodilator therapy as needed.    Hypertension - Close blood pressure monitoring. On losartan and metoprolol.    Anxiety - On lamotrigine.    Leukocytosis - Afebrile. No obvious signs of infection. Now resolved.

## 2021-01-05 NOTE — PROGRESS NOTE ADULT - SUBJECTIVE AND OBJECTIVE BOX
[facility]                          [address] [place] [address]                       Phone: [phone]. Fax:[phone]                      ________________________________________________    HPI:  56 y/o  Female who was in her USOH until couple of days ago when she started having nausea and vomiting without fever or abdomin pain or diarrhea, started having some atypical CP described as indigestion with subsequent progression to substernal CP. She presented to Urgent care for COVID testing but was sent since no capacity and had anxiety attack   Presented to [facility] ED and had evolving EKG with pathologic Q in V1, ST depressions in III, AVF. Troponin was 1.   Patient brought to cath lab for STEMI, now s/p LHC which showed reduced EF 40-45% w/ inferior wall hypokinesis, mCirc 80% Awais grade 3 no intervention, dRCA 100% w/ successful thrombectomy/ PCI x 1 2.5 x 12 mm KALEY TATY. Cangrelor x 2 hours.   Admitted to ICU for further monitoring  (03 Jan 2021 12:52)    ROS: All review of systems negative unless indicated otherwise below.                          LAB RESULTS                   COMPLETE BLOOD COUNT( 05 Jan 2021 06:58 )                            12.0 g/dL  9.77 K/uL )---------------( 311 K/uL                        38.4 %      Automated Differential     Auto Basophil # - X      Auto Basophil % - X      Auto Eosinophil # - X      Auto Eosinophil % - X      Auto Immature Granulocyte # - X      Auto Immature Granulocyte % - X      Auto Lymphocyte # - X      Auto Lymphocyte % - X      Auto Monocyte # - X      Auto Monocyte % - X      Auto Neutrophil # - X      Auto Neutrophil % - X                                      CHEMISTRY                 Basic Metabolic Panel (01-05-21 @ 06:58)    138  |  103  |  19.0  ----------------------------<  105<H>  3.6   |  24.0  |  0.57    Ca    8.8      05 Jan 2021 06:58  Phos  2.7     01-03  Mg     1.6     01-03                    Liver Functions (01-03-21 @ 13:43))  TPro  7.0  /  Alb  3.9  /  TBili  0.6  /  DBili  x   /  AST  172  /  ALT  33  /  AlkPhos  115     PT/INR/PTT ( 03 Jan 2021 10:39 )                        :                       :      13.7         :       54.9                  .        .                   .              .           .       1.19        .                                                                   Cardiac Enzymes   ( 04 Jan 2021 03:02 )  Troponin T  X    ,  CPK  X    , CKMB  X    , BNP 1140<H>    , ( 03 Jan 2021 13:43 )  Troponin T  X    ,  CPK  1104<H>, CKMB  X    , BNP X        , ( 03 Jan 2021 10:39 )  Troponin T  1.04<H>,  CPK  1080<H>, CKMB  X    , BNP X                              MICROBIOLOGY/CULTURES:  Rapid RVP Result: NotDetec (01-04-21 @ 01:13)                          RADIOLOGY RESULTS: Personally visualized   < from: Xray Chest 1 View- PORTABLE-Urgent (Xray Chest 1 View- PORTABLE-Urgent .) (01.03.21 @ 10:42) >  IMPRESSION:   No evidence of active chest disease.    < end of copied text >                            CARDIOLOGY RESULTS: Official Report/Preliminary Verbal Reports    ECHO:   < from: TTE Echo Complete w/ Contrast w/ Doppler (01.04.21 @ 17:40) >  Summary:   1. Endocardial visualization was enhanced with intravenous echo contrast.   2. Mildly enlarged left atrium.   3. Segmental wall motion abnormalities in Left circumflex territory. (dominant Cx).   4. Left ventricular ejection fraction, by visual estimation, is 50 to 55%.   5. The right atrium is normal in size.   6. Normal right ventricular size and function.   7. No significant valvular abnormality.   8. Trivial pericardial effusion.    MD Rick, RPVI Electronically signed on 1/4/2021 at 8:19:42 PM    < end of copied text >    CATH:   < from: Cardiac Cath Lab - Adult (01.03.21 @ 11:00) >  VENTRICLES: Analysis of regional contractile function demonstrated moderate  diaphragmatic hypokinesis and severe posterobasal hypokinesis. Global left  ventricular function was moderately depressed. EF estimated was 40 %.  CORONARY VESSELS: The coronary circulation is right dominant.  LM:   --  LM: Normal.  LAD:   --  LAD: Angiography showed mild atherosclerosis with no flow  limiting lesions.  CX:   --  Mid circumflex: There was a [id number] % stenosis. The lesion was  without evidence of thrombus. There was AWAIS grade 3 flow through the  vessel (brisk flow). This lesion is a likely culprit for the patient's  anginal symptoms.  RCA:   --  Distal RCA: There was a tubular 100 % stenosis. The lesion was  associated with a moderate filling defect consistent with thrombus. There  was AWAIS grade 0 flow through the vessel (no flow). This lesion is a  likely culprit for the patient's recent myocardial infarction. It appears  amenable to percutaneous intervention.  COMPLICATIONS: There were no complications.  INTERVENTIONAL RECOMMENDATIONS: Left heart catheterization demonstrated  severe disease of the LCX. The LAD has mild nonobstructive disease.  The RCA is occluded in the distal segment, just prior to the PDA and PLV  bifurcation.  The d LVEF is reduced at 40 - 45% with inferior wall hypokinesis.  Performed successful thrombectomy, PTCA and stenting to the RCA.  Very good results.  There is some distal thrombus noted in the PLV.  Cangrelar was started, to complete the infusion.  Start dual antiplatelet Rx.  Start BB and ACE/ARB Rx.  Discussed with the patient the importance of smoking cessation.  Plan for PCI to the LCX in 4-6 weeks providing she remains stable.  Prepared and signed by  Dawood Weaver MD  Signed 01/03/2021 11:56:11    < end of copied text >                          CARDIOLOGY REVIEW: Personally visualized and reviewed  EKG: unchanged from previous EKG, NSR, non specific ST segment abnormalities, Inverted T waves III/AVF,   Telemetry Last 24h: SR 82, no ectopy                             INTAKE AND OUTPUT - 48 HOUR TREND   01-03-21 @ 07:01  -  01-04-21 @ 07:00  --------------------------------------------------------  IN:  Total IN: 0 mL    OUT:    Voided (mL): 250 mL  Total OUT: 250 mL    Total NET: -250 mL          HOME MEDICATIONS:  Advair Diskus 100 mcg-50 mcg inhalation powder: 1 puff(s) inhaled 2 times a day (12 Apr 2016 15:57)  lamoTRIgine 100 mg oral tablet, extended release: 1 tab(s) orally once a day (12 Apr 2016 15:57)  Paxil 40 mg oral tablet: 1 tab(s) orally once a day (12 Apr 2016 15:57)  Proventil HFA 90 mcg/inh inhalation aerosol: 2 puff(s) inhaled 4 times a day (12 Apr 2016 15:57)  Suboxone 8 mg-2 mg sublingual film: 2 each sublingual once a day (12 Apr 2016 15:57)  Xanax 1 mg oral tablet: 1 tab(s) orally 3 times a day, As Needed (12 Apr 2016 15:57)                             Current Admission Active Medications    ALBUTerol    90 MICROgram(s) HFA Inhaler 2 Puff(s) Inhalation every 4 hours PRN Shortness of Breath  albuterol/ipratropium for Nebulization 3 milliLiter(s) Nebulizer every 6 hours  ALPRAZolam 0.25 milliGRAM(s) Oral every 12 hours PRN Anxiety  aspirin  chewable 81 milliGRAM(s) Oral daily  atorvastatin 80 milliGRAM(s) Oral at bedtime  budesonide 160 MICROgram(s)/formoterol 4.5 MICROgram(s) Inhaler 2 Puff(s) Inhalation two times a day  chlorhexidine 4% Liquid 1 Application(s) Topical <User Schedule>  heparin   Injectable 5000 Unit(s) SubCutaneous every 8 hours  lamoTRIgine 100 milliGRAM(s) Oral daily  losartan 25 milliGRAM(s) Oral daily  metoprolol tartrate 25 milliGRAM(s) Oral two times a day  nicotine - 21 mG/24Hr(s) Patch 1 patch Transdermal daily  nitroglycerin     SubLingual 0.4 milliGRAM(s) SubLingual every 5 minutes PRN Chest Pain  ticagrelor 90 milliGRAM(s) Oral every 12 hours                        PHYSICAL EXAM:    Vital Signs Last 24 Hrs  T(C): 36.8 (05 Jan 2021 10:00), Max: 37.7 (04 Jan 2021 20:37)  T(F): 98.3 (05 Jan 2021 10:00), Max: 99.8 (04 Jan 2021 20:37)  HR: 82 (05 Jan 2021 10:00) (63 - 82)  BP: 101/67 (05 Jan 2021 10:00) (94/60 - 120/73)  BP(mean): 92 (04 Jan 2021 19:00) (71 - 92)  RR: 18 (05 Jan 2021 10:00) (17 - 21)  SpO2: 96% (05 Jan 2021 10:00) (95% - 100%)    GENERAL: NAD  NECK: Supple, No JVD  NERVOUS SYSTEM:  Alert & Oriented X3, non focal neuro exam.   CHEST/LUNG: clear lungs, No rales, rhonchi, wheezing, or rubs  HEART: Regular rate and rhythm; s1 and s2 auscultated, No murmurs, rubs, or gallops  ABDOMEN: Soft, Nontender, Nondistended; Bowel sounds present and normoactive.   EXTREMITIES:  2+ Peripheral Pulses, No clubbing, cyanosis, or edema  CATH SITE: Right groin benign s/p cath.  No bleeding, no ecchymosis, no hematoma. Extremity Warm to touch, with palpable distal pulses, and brisk capillary refill.

## 2021-01-05 NOTE — PROGRESS NOTE ADULT - ASSESSMENT
This is a 55 yr old woman, current active 1PPD smoker, with PMH of COPD, generalized anxiety, asthma, lung nodule, that presented with 1 day history of midsternal chest pain associated with nausea and vomiting. Chest pain started yesterday and progressively got worse and never really went away, today it significantly got worse after being denied a covid test at an urgent care center which triggered an anxiety attack. Upon arrival to ED pt had evolving EKG with pathologic Q in V1, ST depressions in III, AVF. Troponin was 1, Patient brought to cath lab for STEMI, now s/p [facility] which showed reduced EF 40-45% w/ inferior wall hypokinesis, mCirc 80% Awais grade 3 no intervention, dRCA 100% w/ successful thrombectomy/ PCI x 1 2.5 x 12 mm KALEY TATY. Cangrelor x 2 hours, patient is admitted to CTICU.     STEMI, Unstable Angina CCS4  - s/p [facility] x1 TATY to RCA  - patient with unstable angina today without EKG changes  - plan for Staged PCI of 80% Circ tomorrow  - Serial EKGs/Nitroglycerin   - continue ASA, Brilinta, Lipitor, metoprolol   -continue DAPT with ASA/ticagrelor, if insurance not able to cover then use for 1 month then switch to clopidogrel  - Smoking cessation heavily reinforced  - Diet/lifestyle modifications and medication compliance heavily reinforced     Hematoma   - right groin hematoma compressed yesterday  - today  Right groin benign  No bleeding, no ecchymosis, no hematoma. nontender Extremity Warm to touch, with palpable distal pulses, and brisk capillary refill.     Ischemic CM/HFrEF  - EF 40-45% new onset   - EF on repeat echo improved 50-55%  - continue losartan   - monitor for s/s of volume overload   - strict I/O, daily weights  - dash diet     S/P Coronary Angiogram   - Patient educated about groin site care, signs and symptoms of complication post procedure, and plan of care moving forward. Patient verbalized understanding with teach-back.   - cardiac rehab info provided/referral and communication to cardiac rehab completed     Dispo   - NPO @ [facility] for [facility] in AM for Staged PCI of 80% mCirc

## 2021-01-06 PROCEDURE — 99152 MOD SED SAME PHYS/QHP 5/>YRS: CPT

## 2021-01-06 PROCEDURE — 99233 SBSQ HOSP IP/OBS HIGH 50: CPT

## 2021-01-06 PROCEDURE — 92928 PRQ TCAT PLMT NTRAC ST 1 LES: CPT | Mod: LC

## 2021-01-06 PROCEDURE — 93454 CORONARY ARTERY ANGIO S&I: CPT | Mod: 26,78,59

## 2021-01-06 PROCEDURE — 93010 ELECTROCARDIOGRAM REPORT: CPT

## 2021-01-06 PROCEDURE — 99232 SBSQ HOSP IP/OBS MODERATE 35: CPT

## 2021-01-06 RX ORDER — CLOPIDOGREL BISULFATE 75 MG/1
75 TABLET, FILM COATED ORAL DAILY
Refills: 0 | Status: DISCONTINUED | OUTPATIENT
Start: 2021-01-07 | End: 2021-01-07

## 2021-01-06 RX ORDER — CLOPIDOGREL BISULFATE 75 MG/1
600 TABLET, FILM COATED ORAL ONCE
Refills: 0 | Status: COMPLETED | OUTPATIENT
Start: 2021-01-06 | End: 2021-01-06

## 2021-01-06 RX ORDER — ACETAMINOPHEN 500 MG
650 TABLET ORAL ONCE
Refills: 0 | Status: COMPLETED | OUTPATIENT
Start: 2021-01-06 | End: 2021-01-06

## 2021-01-06 RX ADMIN — LOSARTAN POTASSIUM 25 MILLIGRAM(S): 100 TABLET, FILM COATED ORAL at 04:44

## 2021-01-06 RX ADMIN — Medication 25 MILLIGRAM(S): at 17:24

## 2021-01-06 RX ADMIN — Medication 0.25 MILLIGRAM(S): at 21:37

## 2021-01-06 RX ADMIN — ATORVASTATIN CALCIUM 80 MILLIGRAM(S): 80 TABLET, FILM COATED ORAL at 20:32

## 2021-01-06 RX ADMIN — Medication 650 MILLIGRAM(S): at 20:31

## 2021-01-06 RX ADMIN — Medication 81 MILLIGRAM(S): at 06:35

## 2021-01-06 RX ADMIN — TICAGRELOR 90 MILLIGRAM(S): 90 TABLET ORAL at 04:44

## 2021-01-06 RX ADMIN — LAMOTRIGINE 100 MILLIGRAM(S): 25 TABLET, ORALLY DISINTEGRATING ORAL at 17:24

## 2021-01-06 RX ADMIN — Medication 3 MILLILITER(S): at 21:03

## 2021-01-06 RX ADMIN — Medication 25 MILLIGRAM(S): at 04:44

## 2021-01-06 RX ADMIN — HEPARIN SODIUM 5000 UNIT(S): 5000 INJECTION INTRAVENOUS; SUBCUTANEOUS at 20:32

## 2021-01-06 RX ADMIN — Medication 3 MILLILITER(S): at 16:31

## 2021-01-06 RX ADMIN — Medication 0.25 MILLIGRAM(S): at 10:32

## 2021-01-06 RX ADMIN — CLOPIDOGREL BISULFATE 600 MILLIGRAM(S): 75 TABLET, FILM COATED ORAL at 17:28

## 2021-01-06 NOTE — CHART NOTE - NSCHARTNOTEFT_GEN_A_CORE
Department of Cardiology                                                                  [facility]/[place] [address]  [place]                                                            Telephone: [phone]. Fax:[phone]                                                                                   Pre Cath Note    55y Female     Planned Procedure: PCI of LCX    Pertinent Prior Medications:        Antiplatelet: Brilinta 90 mg BID       Aspirin: 81 mg daily       Statin: Lipitor 80 mg daily       Beta Blocker: Metoprolol  25 mg BID       CCB: N/A       Other Antianginal: N/A       ACEI: Losartan 25 mg daily    ASA: 3  Mallampati: 2  CCS Class:   GFR: 104  Adjusted Bleeding Risk: 5.6%    HPI: 56 y/o  Female who was in her USOH until couple of days ago when she started having nausea and vomiting without fever or abdomin pain or diarrhea, started having some atypical CP described as indigestion with subsequent progression to substernal CP. She presented to Urgent care for COVID testing but was sent since no capacity and had anxiety attack  Presented to [facility] ED and had evolving EKG with pathologic Q in V1, ST depressions in III, AVF. Troponin was 1.   Patient brought to cath lab for STEMI, now s/p LHC which showed reduced EF 40-45% w/ inferior wall hypokinesis, mCirc 80% Awais grade 3 no intervention, dRCA 100% w/ successful thrombectomy/ PCI x 1 2.5 x 12 mm KALEY TATY. Cangrelor x 2 hours.  Admitted to ICU for further monitoring  (03 Jan 2021 12:52)    PCI:   VENTRICLES: Analysis of regional contractile function demonstrated moderate diaphragmatic hypokinesis and severe posterobasal hypokinesis. Global left ventricular function was moderately depressed. EF estimated was 40 %.  CORONARY VESSELS: The coronary circulation is right dominant.  LM:     --  LM: Normal.  LAD:     --  LAD: Angiography showed mild atherosclerosis with no flow limiting lesions.  CX:     --  Mid circumflex: There was a tubular 80 % stenosis. The lesion was without evidence of thrombus. There was AWAIS grade 3 flow through the vessel (brisk flow). This lesion is a likely culprit for the patient's anginal symptoms.  RCA:     --  Distal RCA: There was a tubular 100 % stenosis. The lesion was associated with a moderate filling defect consistent with thrombus. There was AWAIS grade 0 flow through the vessel (no flow). This lesion is a likely culprit for the patient's recent myocardial infarction. It appears amenable to percutaneous intervention. It was treated with an KALEY 2.50 X 12MM drug-eluting stent.    Nuclear Stress Test: N/A    Echo:   Left Ventricle: Endocardial visualization was enhanced with intravenous echo contrast. The left ventricular internal cavity size is normal. Left ventricular wall thickness is normal. Global LV systolic function was mildly decreased. Left ventricular ejection fraction, by visual estimation, is 50 to 55%. Spectral Doppler shows normal pattern of LV diastolic filling. Segmental wall motion abnormalities in Left circumflex territory. (dominant Cx).  LV Wall Scoring: The basal and mid inferior wall, basal anteroseptal segment, basal inferoseptal segment, and basal anterolateral segment are akinetic. The mid inferoseptal segment is hypokinetic.  Right Ventricle: Normal right ventricular size and function. The right ventricle was not well visualized.  Left Atrium: Mildly enlarged left atrium.  Right Atrium: The right atrium is normal in size. The right atrium was not well visualized.  Pericardium: Trivial pericardial effusion is present. The pericardial effusion is anterior to the right ventricle. The pericardial effusion appears to contain mixed echogenic material.  Mitral Valve: Mild thickening of the anterior and posterior mitral valve leaflets.  Tricuspid Valve: The tricuspid valve is not well seen. Trivial tricuspid regurgitation is visualized.  Aortic Valve: The aortic valve is trileaflet. No evidence of aortic valve regurgitation is seen.  Pulmonic Valve: The pulmonic valve is normal. Trace pulmonic valve regurgitation.  Aorta: The aortic root and ascending aorta are structurally normal, with no evidence of dilitation.  Pulmonary Artery: The main pulmonary artery is normal in size.  Venous: The inferior vena cava was normal sized, with respiratory size variation greater than 50%.    Allergies: Thorazine (Other (Moderate))    PAST MEDICAL & SURGICAL HISTORY:  HTN (hypertension)  Chronic obstructive pulmonary disease, unspecified COPD type  Asthma  Anxiety  Clavicle fracture    Home Medications:  Advair Diskus 100 mcg-50 mcg inhalation powder: 1 puff(s) inhaled 2 times a day (12 Apr 2016 15:57)  lamoTRIgine 100 mg oral tablet, extended release: 1 tab(s) orally once a day (12 Apr 2016 15:57)  Paxil 40 mg oral tablet: 1 tab(s) orally once a day (12 Apr 2016 15:57)  Proventil HFA 90 mcg/inh inhalation aerosol: 2 puff(s) inhaled 4 times a day (12 Apr 2016 15:57)  Suboxone 8 mg-2 mg sublingual film: 2 each sublingual once a day (12 Apr 2016 15:57)  Xanax 1 mg oral tablet: 1 tab(s) orally 3 times a day, As Needed (12 Apr 2016 15:57)    Physical Examination:   General: Awake, alert, speech clear, no acute distress.  Neck: No bruit, normal jugular venous pressures  Chest: Clear CTA, S1, S2, no murmur, RRR  Right groin: soft, no bleeding, no hematoma  Extremities: No edema                          12.0   9.77  )-----------( 311      ( 05 Jan 2021 06:58 )             38.4     01-05    138  |  103  |  19.0  ----------------------------<  105  3.6   |  24.0  |  0.57    Ca    8.8      05 Jan 2021 06:58          Assessment and Plan:   The patient was examined and interviewed by me today. There has been no change from the original history and physical except as noted above.    Problem List:   1. CAD of LCX with know stenosis and continued angina  In light of known CAD and continued c/o chest pain, PCI of LCX to be performed.    2.

## 2021-01-06 NOTE — PROGRESS NOTE ADULT - SUBJECTIVE AND OBJECTIVE BOX
YORDY ARDON  ----------------------------------------  The patient was seen and evaluated for STEMI. Denied any complaints.    Vital Signs Last 24 Hrs  T(C): 36.7 (06 Jan 2021 06:18), Max: 37.6 (05 Jan 2021 22:23)  T(F): 98.1 (06 Jan 2021 06:18), Max: 99.7 (05 Jan 2021 22:23)  HR: 58 (06 Jan 2021 09:00) (58 - 86)  BP: 90/62 (06 Jan 2021 09:00) (88/60 - 110/75)  BP(mean): --  RR: 18 (06 Jan 2021 09:00) (18 - 18)  SpO2: 97% (06 Jan 2021 09:00) (96% - 99%)    PHYSICAL EXAMINATION:  ----------------------------------------  General appearance: No acute distress, Awake, Alert  HEENT: Normocephalic, Atraumatic, Conjunctiva clear, EOMI  Neck: Supple, No JVD, No tenderness  Lungs: Breath sound equal bilaterally, No wheezes, No rales  Cardiovascular: S1S2, Regular rhythm  Abdomen: Soft, Nontender, Nondistended, No guarding/rebound, Positive bowel sounds  Extremities: No clubbing, No cyanosis, No edema, No calf tenderness  Neuro: Strength equal bilaterally, No tremors  Psychiatric: Appropriate mood, Normal affect    LABORATORY STUDIES:  ----------------------------------------             12.0   9.77  )-----------( 311      ( 05 Jan 2021 06:58 )             38.4     01-05    138  |  103  |  19.0  ----------------------------<  105<H>  3.6   |  24.0  |  0.57    Ca    8.8      05 Jan 2021 06:58    MEDICATIONS  (STANDING):  albuterol/ipratropium for Nebulization 3 milliLiter(s) Nebulizer every 6 hours  aspirin  chewable 81 milliGRAM(s) Oral daily  atorvastatin 80 milliGRAM(s) Oral at bedtime  budesonide 160 MICROgram(s)/formoterol 4.5 MICROgram(s) Inhaler 2 Puff(s) Inhalation two times a day  chlorhexidine 4% Liquid 1 Application(s) Topical <User Schedule>  clopidogrel Tablet 600 milliGRAM(s) Oral once  heparin   Injectable 5000 Unit(s) SubCutaneous every 8 hours  lamoTRIgine 100 milliGRAM(s) Oral daily  losartan 25 milliGRAM(s) Oral daily  metoprolol tartrate 25 milliGRAM(s) Oral two times a day  nicotine - 21 mG/24Hr(s) Patch 1 patch Transdermal daily    MEDICATIONS  (PRN):  ALBUTerol    90 MICROgram(s) HFA Inhaler 2 Puff(s) Inhalation every 4 hours PRN Shortness of Breath  ALPRAZolam 0.25 milliGRAM(s) Oral every 12 hours PRN Anxiety  nitroglycerin     SubLingual 0.4 milliGRAM(s) SubLingual every 5 minutes PRN Chest Pain      ASSESSMENT / PLAN:  ----------------------------------------  55F with a history of COPD, hypertension, and anxiety who presented with chest discomfort and was noted to have an STEMI requiring cardiac catheterization and percutaneous coronary intervention to the right coronary artery. She had recurrent chest discomfort and underwent staged cardiac catheterization.    STEMI / Coronary artery disease - Status post percutaneous coronary intervention to the circumflex. On clopidogrel, aspirin, and atorvastatin.    COPD - No dyspnea or wheezing noted on examination. On budesonide. Inhaled bronchodilator therapy as needed.    Hypertension - Close blood pressure monitoring. On losartan and metoprolol.    Anxiety - On lamotrigine.    Leukocytosis - Afebrile. No obvious signs of infection. Now resolved.

## 2021-01-06 NOTE — PROGRESS NOTE ADULT - SUBJECTIVE AND OBJECTIVE BOX
Department of Cardiology                                                                  [facility]/[place] [address] [facility]                                                            Telephone: [phone]. Fax:[phone]                                                                                         CARDIOLOGY NOTE       Subjective:  55y Female who had a left heart catheterization which showed:  VENTRICLES: No LV gram was performed; however, a recent echocardiogram demonstrated an EF of 45 %.  CORONARY VESSELS: The coronary circulation is right dominant.  LM:     --  LM: Normal.  LAD:     --  Distal LAD: There was a 20 % stenosis.  CX:     --  Mid circumflex: There was a diffuse 80 % stenosis. It was treated with an KALEY 2.75 X 26MM drug-eluting stent  RCA:     --  Proximal RCA: There was a 20 % stenosis.  --  Mid RCA: There was a 20 % stenosis.  --  Distal RCA: There was a 0 % stenosis in-stent.    Interval History: No chest pain, SOB, or palpitations overnight.    HPI: 54 y/o  Female who was in her USOH until couple of days ago when she started having nausea and vomiting without fever or abdomin pain or diarrhea, started having some atypical CP described as indigestion with subsequent progression to substernal CP. She presented to Urgent care for COVID testing but was sent since no capacity and had anxiety attack   Presented to [facility] ED and had evolving EKG with pathologic Q in V1, ST depressions in III, AVF. Troponin was 1.   Patient brought to cath lab for STEMI, now s/p LHC which showed reduced EF 40-45% w/ inferior wall hypokinesis, mCirc 80% Awais grade 3 no intervention, dRCA 100% w/ successful thrombectomy/ PCI x 1 2.5 x 12 mm KALEY TATY. Cangrelor x 2 hours.   Admitted to ICU for further monitoring  (03 Jan 2021 12:52)    PAST MEDICAL & SURGICAL HISTORY:  HTN (hypertension)  Chronic obstructive pulmonary disease, unspecified COPD type  Asthma  Anxiety  Clavicle fracture    FAMILY HISTORY:  Family history of coronary artery disease in brother    Allergies: Thorazine (Other (Moderate))    Home Medications:  Advair Diskus 100 mcg-50 mcg inhalation powder: 1 puff(s) inhaled 2 times a day (12 Apr 2016 15:57)  lamoTRIgine 100 mg oral tablet, extended release: 1 tab(s) orally once a day (12 Apr 2016 15:57)  Paxil 40 mg oral tablet: 1 tab(s) orally once a day (12 Apr 2016 15:57)  Proventil HFA 90 mcg/inh inhalation aerosol: 2 puff(s) inhaled 4 times a day (12 Apr 2016 15:57)  Suboxone 8 mg-2 mg sublingual film: 2 each sublingual once a day (12 Apr 2016 15:57)  Xanax 1 mg oral tablet: 1 tab(s) orally 3 times a day, As Needed (12 Apr 2016 15:57)    MEDICATIONS  (STANDING):  albuterol/ipratropium for Nebulization 3 milliLiter(s) Nebulizer every 6 hours  aspirin  chewable 81 milliGRAM(s) Oral daily  atorvastatin 80 milliGRAM(s) Oral at bedtime  budesonide 160 MICROgram(s)/formoterol 4.5 MICROgram(s) Inhaler 2 Puff(s) Inhalation two times a day  chlorhexidine 4% Liquid 1 Application(s) Topical <User Schedule>  heparin   Injectable 5000 Unit(s) SubCutaneous every 8 hours  lamoTRIgine 100 milliGRAM(s) Oral daily  losartan 25 milliGRAM(s) Oral daily  metoprolol tartrate 25 milliGRAM(s) Oral two times a day  nicotine - 21 mG/24Hr(s) Patch 1 patch Transdermal daily  ticagrelor 90 milliGRAM(s) Oral every 12 hours    MEDICATIONS  (PRN):  ALBUTerol    90 MICROgram(s) HFA Inhaler 2 Puff(s) Inhalation every 4 hours PRN Shortness of Breath  ALPRAZolam 0.25 milliGRAM(s) Oral every 12 hours PRN Anxiety  nitroglycerin     SubLingual 0.4 milliGRAM(s) SubLingual every 5 minutes PRN Chest Pain    Objective:  Vital Signs Last 24 Hrs  T(C): 36.7 (06 Jan 2021 06:18), Max: 37.6 (05 Jan 2021 22:23)  T(F): 98.1 (06 Jan 2021 06:18), Max: 99.7 (05 Jan 2021 22:23)  HR: 64 (06 Jan 2021 08:15) (64 - 86)  BP: 93/64 (06 Jan 2021 08:15) (93/64 - 110/75)  RR: 18 (06 Jan 2021 08:15) (18 - 18)  SpO2: 97% (06 Jan 2021 08:15) (96% - 99%)    CM: SR  Neuro: A&OX3, CN 2-12 intact  HEENT: NC, AT  Lungs: CTA B/L  CV: S1, S2, no murmur, RRR  Abd: Soft  Extremity: Right radial band: no bleeding, fingers warm with good cap refil  EKG: NSR, inferior and lateral ST/T wave abnormality    Echo:   Left Ventricle: Endocardial visualization was enhanced with intravenous echo contrast. The left ventricular internal cavity size is normal. Left ventricular wall thickness is normal. Global LV systolic function was mildly decreased. Left ventricular ejection fraction, by visual estimation, is 50 to 55%. Spectral Doppler shows normal pattern of LV diastolic filling. Segmental wall motion abnormalities in Left circumflex territory. (dominant Cx).  LV Wall Scoring: The basal and mid inferior wall, basal anteroseptal segment, basal inferoseptal segment, and basal anterolateral segment are akinetic. The mid inferoseptal segment is hypokinetic.  Right Ventricle: Normal right ventricular size and function. The right ventricle was not well visualized.  Left Atrium: Mildly enlarged left atrium.  Right Atrium: The right atrium is normal in size. The right atrium was not well visualized.  Pericardium: Trivial pericardial effusion is present. The pericardial effusion is anterior to the right ventricle. The pericardial effusion appears to contain mixed echogenic material.  Mitral Valve: Mild thickening of the anterior and posterior mitral valve leaflets.  Tricuspid Valve: The tricuspid valve is not well seen. Trivial tricuspid regurgitation is visualized.  Aortic Valve: The aortic valve is trileaflet. No evidence of aortic valve regurgitation is seen.  Pulmonic Valve: The pulmonic valve is normal. Trace pulmonic valve regurgitation.  Aorta: The aortic root and ascending aorta are structurally normal, with no evidence of dilitation.  Pulmonary Artery: The main pulmonary artery is normal in size.  Venous: The inferior vena cava was normal sized, with respiratory size variation greater than 50%.                          12.0   9.77  )-----------( 311      ( 05 Jan 2021 06:58 )             38.4     01-05    138  |  103  |  19.0  ----------------------------<  105  3.6   |  24.0  |  0.57    Ca    8.8      05 Jan 2021 06:58

## 2021-01-06 NOTE — PROGRESS NOTE ADULT - SUBJECTIVE AND OBJECTIVE BOX
[place] [facility]                                                               Office: [address] [place] [address]                                                              Telephone: [phone]. Fax:[phone]                                                                             PROGRESS NOTE  Reason for follow up: STEMI, CAD/stage PCI  Overnight: No new events.   Update: s/p staged PCI via R-radial with TATY to LCx, feeling well, denies any chest discomfort, not anxious feeling today, pending social work follow up regarding insurance status, switch ticagrelor to clopidogrel for affordability       Review of symptoms:   Cardiac:  No chest pain. No dyspnea. No palpitations.  Respiratory: No cough. No dyspnea  Gastrointestinal: No diarrhea. No abdominal pain. No bleeding.     Past medical history: No updates.   	  Vital Signs Last 24 Hrs  T(C): 36.7 (01-06-21 @ 06:18), Max: 37.6 (01-05-21 @ 22:23)  T(F): 98.1 (01-06-21 @ 06:18), Max: 99.7 (01-05-21 @ 22:23)  HR: 58 (01-06-21 @ 09:00) (58 - 86)  BP: 90/62 (01-06-21 @ 09:00) (88/60 - 110/75)  BP(mean): --  RR: 18 (01-06-21 @ 09:00) (18 - 18)  SpO2: 97% (01-06-21 @ 09:00) (96% - 99%)  I&O's Summary    05 Jan 2021 07:01  -  06 Jan 2021 07:00  --------------------------------------------------------  IN: 480 mL / OUT: 0 mL / NET: 480 mL          PHYSICAL EXAM:  Appearance: Comfortable. No acute distress  HEENT:  Head and neck: Atraumatic. Normocephalic.  Normal oral mucosa, PERRL, Neck is supple. No JVD, No carotid bruit.   Neurologic: A&Ox 3, no focal deficits. EOMI, Cranial nerves are intact.  Lymphatic: No cervical lymphadenopathy  Cardiovascular: Normal S1 S2, No murmur, rubs/gallops. No JVD, No edema  Respiratory: Lungs clear to auscultation  Gastrointestinal:  Soft, Non-tender, + BS  Lower Extremities: No edema, R-radial band  Psychiatry: Patient is calm. No agitation. Mood & affect appropriate  Skin: No rashes/ecchymoses/cyanosis/ulcers visualized on the face, hands or feet.      CURRENT MEDICATIONS:  MEDICATIONS  (STANDING):  albuterol/ipratropium for Nebulization 3 milliLiter(s) Nebulizer every 6 hours  aspirin  chewable 81 milliGRAM(s) Oral daily  atorvastatin 80 milliGRAM(s) Oral at bedtime  budesonide 160 MICROgram(s)/formoterol 4.5 MICROgram(s) Inhaler 2 Puff(s) Inhalation two times a day  chlorhexidine 4% Liquid 1 Application(s) Topical <User Schedule>  clopidogrel Tablet 600 milliGRAM(s) Oral once  heparin   Injectable 5000 Unit(s) SubCutaneous every 8 hours  lamoTRIgine 100 milliGRAM(s) Oral daily  losartan 25 milliGRAM(s) Oral daily  metoprolol tartrate 25 milliGRAM(s) Oral two times a day  nicotine - 21 mG/24Hr(s) Patch 1 patch Transdermal daily    MEDICATIONS  (PRN):  ALBUTerol    90 MICROgram(s) HFA Inhaler 2 Puff(s) Inhalation every 4 hours PRN Shortness of Breath  ALPRAZolam 0.25 milliGRAM(s) Oral every 12 hours PRN Anxiety  nitroglycerin     SubLingual 0.4 milliGRAM(s) SubLingual every 5 minutes PRN Chest Pain      DIAGNOSTIC TESTING:  [ ] Echocardiogram:   < from: TTE Echo Complete w/ Contrast w/ Doppler (01.04.21 @ 17:40) >  Summary:   1. Endocardial visualization was enhanced with intravenous echo contrast.   2. Mildly enlarged left atrium.   3. Segmental wall motion abnormalities in Left circumflex territory. (dominant Cx).   4. Left ventricular ejection fraction, by visual estimation, is 50 to 55%.   5. The right atrium is normal in size.   6. Normal right ventricular size and function.   7. No significant valvular abnormality.   8. Trivial pericardial effusion.    < end of copied text >    [ ]  Catheterization:  < from: Cardiac Cath Lab - Adult (01.06.21 @ 07:09) >  VENTRICLES: No LV gram was performed; however, arecent echocardiogram  demonstrated an EF of 45 %.  CORONARY VESSELS: The coronary circulation is right dominant.  LM:   --  LM: Normal.  LAD:   --  Distal LAD: There was a 20 % stenosis.  CX:   --  Mid circumflex: There was a diffuse 80 % stenosis.  RCA:   --  Proximal RCA: There was a 20 % stenosis.  --  Mid RCA: There was a 20 % stenosis.  --  Distal RCA: There was a 0 % stenosis in-stent.  COMPLICATIONS: No complications occurred during the cath lab visit. No  complications occurred during the cath lab visit.  DIAGNOSTIC IMPRESSIONS: Severe circumflex disease. PCI with 1 TATY.  DIAGNOSTIC RECOMMENDATIONS: Consider switching to plavix prior to  discharge.    < end of copied text >    [ ] Stress Test:      Labs:                        12.0   9.77  )-----------( 311      ( 05 Jan 2021 06:58 )             38.4     01-05    138  |  103  |  19.0  ----------------------------<  105<H>  3.6   |  24.0  |  0.57    Ca    8.8      05 Jan 2021 06:58       03 Jan 2021 13:43 Troponin x     / Creatine Kinase 1104 U/L /  CKMB 91.5 ng/mL / CPK Mass Assay % x       03 Jan 2021 10:39 Troponin 1.04 ng/mL / Creatine Kinase 1080 U/L /  CKMB 75.3 ng/mL / CPK Mass Assay % x          Serum Pro-Brain Natriuretic Peptide: 1140 pg/mL (01-04-21 @ 03:02)    Cholesterol 202 mg/dL; Direct LDL --; HDL Cholesterol, Serum 59 mg/dL; HDL/ Total Cholesterol Ratio Measurement --; Total Cholesterol/ HDL Ratio Measurement --; Triglycerides, Serum 116 mg/dL  A1C with Estimated Average Glucose Result: 4.8 % (01-04-21 @ 03:02)      TELEMETRY: Sinus

## 2021-01-06 NOTE — PROGRESS NOTE ADULT - ASSESSMENT
55 yr old woman, current active 1PPD smoker, with PMH of COPD, generalized anxiety, asthma, lung nodule, that presented with 1 day history of midsternal chest pain associated with nausea and vomiting. Chest pain started yesterday and progressively got worse and never really went away, today it significantly got worse after being denied a covid test at an urgent care center which triggered an anxiety attack. Upon arrival to ED pt had evolving EKG with pathologic Q in V1, ST depressions in III, AVF. Troponin was 1, Patient brought to cath lab for STEMI, now s/p LHC which showed reduced EF 40-45% w/ inferior wall hypokinesis, mCirc 80% Awais grade 3 no intervention, dRCA 100% w/ successful thrombectomy/ PCI x 1 2.5 x 12 mm KALEY TATY. Cangrelor x 2 hours, patient is admitted to CTICU, had small L-groin hematoma s/p compression, transferred to telemetry unit.     Inferior wall STEMI  - s/p LHC x1 TATY to RCA  - recurrent chest pain yesterday s/p staged PCI/TATY to LCx today via RRA  - switch ticagrelor to clopidogrel as concern for insurance coverage, continue DAPT least 1 year  - A1c 4.8, ,  continue high dose statin      Ischemic CM, pBNP 1140, euvolemic  - EF on cath  45%   - EF on Echo 50-55%  - switch metoprolol to succinate on discharge and continue low dose losartan, borderline BP will not add spironolactone    Anxiety  - on lamotrigine and Xanax      Smoker/COPD  - on inhaler  - continue nicotine patch/smoking cessation    Discharge planning home tomorrow, social work follow up regarding insurance lapses     Octaviano Diaz DO, [facility]  Faculty Non-Invasive Cardiologist  [phone]

## 2021-01-06 NOTE — PROGRESS NOTE ADULT - ASSESSMENT
55y Female  Procedure: Left heart catheterization and PCI with TATY of the LCX  Pre-op diagnosis: CAD of the native LCX of the native heart with STEMI  Post-op diagnosis: CAD of the native LCX of the native heart with STEMI    1. Remove right band at: 10:00AM    2. No heavy lifting  with right arm    3. Admitted as an inpatient to: [address] [place] [address]       Reason for inpatient admission: STEMI    4. Follow up as an outpatient with Dr. Diaz    5. DAPT: Plavix 75mg daily and Aspirin 81mg daily    6. Statin: Lipitor 80 mg daily    7. CBC, BMP, Mg, EKG in AM    8. Continue current medications    9. Cardiac rehab info provided/referral and communication to cardiac rehab completed     10. Probable discharge in AM.   55y Female  Procedure: Left heart catheterization and PCI with TATY of the LCX  Pre-op diagnosis: CAD of the native LCX of the native heart with STEMI  Post-op diagnosis: CAD of the native LCX of the native heart with STEMI    1. Remove right band at: 10:00AM    2. No heavy lifting  with right arm    3. Admitted as an inpatient to: [address] [place] [address]       Reason for inpatient admission: STEMI    4. Follow up as an outpatient with Dr. Diaz    5. DAPT: Patient recieved Am dose of Brilinta this morning. will load later today with Plavix 600 mg then start Plavix 75mg daily and aspirin 81mg daily tomorrow.    6. Statin: Lipitor 80 mg daily    7. CBC, BMP, Mg, EKG in AM    8. Continue current medications    9. Cardiac rehab info provided/referral and communication to cardiac rehab completed     10. Probable discharge in AM.

## 2021-01-07 ENCOUNTER — TRANSCRIPTION ENCOUNTER (OUTPATIENT)
Age: 56
End: 2021-01-07

## 2021-01-07 VITALS — RESPIRATION RATE: 18 BRPM | HEART RATE: 73 BPM | OXYGEN SATURATION: 97 % | TEMPERATURE: 98 F

## 2021-01-07 LAB
ANION GAP SERPL CALC-SCNC: 11 MMOL/L — SIGNIFICANT CHANGE UP (ref 5–17)
BUN SERPL-MCNC: 13 MG/DL — SIGNIFICANT CHANGE UP (ref 8–20)
CALCIUM SERPL-MCNC: 8.9 MG/DL — SIGNIFICANT CHANGE UP (ref 8.6–10.2)
CHLORIDE SERPL-SCNC: 105 MMOL/L — SIGNIFICANT CHANGE UP (ref 98–107)
CO2 SERPL-SCNC: 24 MMOL/L — SIGNIFICANT CHANGE UP (ref 22–29)
CREAT SERPL-MCNC: 0.56 MG/DL — SIGNIFICANT CHANGE UP (ref 0.5–1.3)
GLUCOSE SERPL-MCNC: 87 MG/DL — SIGNIFICANT CHANGE UP (ref 70–99)
HCT VFR BLD CALC: 36.1 % — SIGNIFICANT CHANGE UP (ref 34.5–45)
HGB BLD-MCNC: 11.3 G/DL — LOW (ref 11.5–15.5)
MAGNESIUM SERPL-MCNC: 2 MG/DL — SIGNIFICANT CHANGE UP (ref 1.6–2.6)
MCHC RBC-ENTMCNC: 30.3 PG — SIGNIFICANT CHANGE UP (ref 27–34)
MCHC RBC-ENTMCNC: 31.3 GM/DL — LOW (ref 32–36)
MCV RBC AUTO: 96.8 FL — SIGNIFICANT CHANGE UP (ref 80–100)
PLATELET # BLD AUTO: 355 K/UL — SIGNIFICANT CHANGE UP (ref 150–400)
POTASSIUM SERPL-MCNC: 3.9 MMOL/L — SIGNIFICANT CHANGE UP (ref 3.5–5.3)
POTASSIUM SERPL-SCNC: 3.9 MMOL/L — SIGNIFICANT CHANGE UP (ref 3.5–5.3)
RBC # BLD: 3.73 M/UL — LOW (ref 3.8–5.2)
RBC # FLD: 13.2 % — SIGNIFICANT CHANGE UP (ref 10.3–14.5)
SODIUM SERPL-SCNC: 140 MMOL/L — SIGNIFICANT CHANGE UP (ref 135–145)
WBC # BLD: 9.97 K/UL — SIGNIFICANT CHANGE UP (ref 3.8–10.5)
WBC # FLD AUTO: 9.97 K/UL — SIGNIFICANT CHANGE UP (ref 3.8–10.5)

## 2021-01-07 PROCEDURE — 93010 ELECTROCARDIOGRAM REPORT: CPT

## 2021-01-07 PROCEDURE — 99239 HOSP IP/OBS DSCHRG MGMT >30: CPT

## 2021-01-07 PROCEDURE — 99232 SBSQ HOSP IP/OBS MODERATE 35: CPT

## 2021-01-07 RX ORDER — ATORVASTATIN CALCIUM 80 MG/1
1 TABLET, FILM COATED ORAL
Qty: 30 | Refills: 0
Start: 2021-01-07 | End: 2021-02-05

## 2021-01-07 RX ORDER — CLOPIDOGREL BISULFATE 75 MG/1
1 TABLET, FILM COATED ORAL
Qty: 30 | Refills: 0
Start: 2021-01-07 | End: 2021-02-05

## 2021-01-07 RX ORDER — METOPROLOL TARTRATE 50 MG
1 TABLET ORAL
Qty: 30 | Refills: 0
Start: 2021-01-07 | End: 2021-02-05

## 2021-01-07 RX ORDER — LISINOPRIL 2.5 MG/1
1 TABLET ORAL
Qty: 0 | Refills: 0 | DISCHARGE

## 2021-01-07 RX ORDER — METOPROLOL TARTRATE 50 MG
50 TABLET ORAL DAILY
Refills: 0 | Status: DISCONTINUED | OUTPATIENT
Start: 2021-01-07 | End: 2021-01-07

## 2021-01-07 RX ORDER — IPRATROPIUM/ALBUTEROL SULFATE 18-103MCG
3 AEROSOL WITH ADAPTER (GRAM) INHALATION
Qty: 300 | Refills: 0
Start: 2021-01-07 | End: 2021-02-05

## 2021-01-07 RX ORDER — ASPIRIN/CALCIUM CARB/MAGNESIUM 324 MG
1 TABLET ORAL
Qty: 0 | Refills: 0 | DISCHARGE
Start: 2021-01-07

## 2021-01-07 RX ORDER — NICOTINE POLACRILEX 2 MG
1 GUM BUCCAL
Qty: 30 | Refills: 0
Start: 2021-01-07 | End: 2021-02-05

## 2021-01-07 RX ORDER — FLUOXETINE HCL 10 MG
1 CAPSULE ORAL
Qty: 0 | Refills: 0 | DISCHARGE

## 2021-01-07 RX ORDER — ALPRAZOLAM 0.25 MG
1 TABLET ORAL
Qty: 14 | Refills: 0
Start: 2021-01-07 | End: 2021-01-13

## 2021-01-07 RX ADMIN — LOSARTAN POTASSIUM 25 MILLIGRAM(S): 100 TABLET, FILM COATED ORAL at 06:26

## 2021-01-07 RX ADMIN — Medication 50 MILLIGRAM(S): at 09:30

## 2021-01-07 RX ADMIN — LAMOTRIGINE 100 MILLIGRAM(S): 25 TABLET, ORALLY DISINTEGRATING ORAL at 12:01

## 2021-01-07 RX ADMIN — CLOPIDOGREL BISULFATE 75 MILLIGRAM(S): 75 TABLET, FILM COATED ORAL at 12:01

## 2021-01-07 RX ADMIN — Medication 0.25 MILLIGRAM(S): at 09:32

## 2021-01-07 RX ADMIN — Medication 81 MILLIGRAM(S): at 12:01

## 2021-01-07 RX ADMIN — HEPARIN SODIUM 5000 UNIT(S): 5000 INJECTION INTRAVENOUS; SUBCUTANEOUS at 06:26

## 2021-01-07 RX ADMIN — CHLORHEXIDINE GLUCONATE 1 APPLICATION(S): 213 SOLUTION TOPICAL at 06:23

## 2021-01-07 RX ADMIN — Medication 25 MILLIGRAM(S): at 06:26

## 2021-01-07 RX ADMIN — Medication 1 PATCH: at 12:01

## 2021-01-07 NOTE — DISCHARGE NOTE PROVIDER - NSDCCPCAREPLAN_GEN_ALL_CORE_FT
PRINCIPAL DISCHARGE DIAGNOSIS  Diagnosis: STEMI (ST elevation myocardial infarction)  Assessment and Plan of Treatment: Continue on the cardiac medications. Follow up with Cardiology for further management.      SECONDARY DISCHARGE DIAGNOSES  Diagnosis: Chronic obstructive pulmonary disease (COPD)  Assessment and Plan of Treatment: Smoking cessation. Use the nebulizer as needed.    Diagnosis: Anxiety  Assessment and Plan of Treatment: Continue on lamotrigine and fluoxetine. Follow up with your primary care physician for further management.

## 2021-01-07 NOTE — DISCHARGE NOTE PROVIDER - HOSPITAL COURSE
55F who presented with nausea, vomiting, and chest pain. On presentation, EKG was noted to have ST elevations, WBC(17.7), K(3), AlkPhos(127), AST/ALT(171/36), CPK(1080), Trop(1.04). The patient was seen by Cardiology in consultation and thought to benefit from further ischemic evaluation. The patient underwent cardiac catheterization noting 100% stenosis of the right coronary artery requiring stent placement and 80% stenosis of the circumflex. The patient was monitored in the intensive care unit and then transferred to the medical floor for further management. Echocardiogram noted mildly decreased left ventricular systolic function, akinetic basal and mid inferior wall, basal anteroseptal segment, basal inferoseptal segment, and basal anterolateral segment, hypokinetic mid inferoseptal segment, ejection fraction of 50 to 55%. The patient had recurrent chest pain and was seen by Cardiology with recommendations to pursue repeat cardiac catheterization and had stent placement to he circumflex. The patient tolerated the procedure well and was discharged home with instructions to follow up with her primary care physician and Cardiology for further management.      35 minutes total time

## 2021-01-07 NOTE — DISCHARGE NOTE PROVIDER - PROVIDER TOKENS
PROVIDER:[TOKEN:[[id number]],FOLLOWUP:[2 weeks]] PROVIDER:[TOKEN:[[id number]],FOLLOWUP:[2 weeks]],PROVIDER:[TOKEN:[[id number]]]

## 2021-01-07 NOTE — DISCHARGE NOTE PROVIDER - CARE PROVIDER_API CALL
Octaviano Diaz (DO)  Cardiology; Internal Medicine  [address] [facility], [place] [address]  Phone: [phone]  Fax: [phone]  Follow Up Time: 2 weeks   Octaviano Diaz (DO)  Cardiology; Internal Medicine  [address] [facility], [address]  [place] [address]  Phone: [phone]  Fax: [phone]  Follow Up Time: 2 weeks    ROSAMARIA DANG  Internal Medicine  [address] [place] [address]  Phone: [phone]  Fax: [phone]  Follow Up Time:

## 2021-01-07 NOTE — DISCHARGE NOTE PROVIDER - NSDCMRMEDTOKEN_GEN_ALL_CORE_FT
Adderall 5 mg oral tablet: 1 tab(s) orally 2 times a day MDD:2  Advair Diskus 100 mcg-50 mcg inhalation powder: 1 puff(s) inhaled 2 times a day  azithromycin 250 mg oral tablet: 2 tab(s) orally once a day x 1 day then 1 tab daily x 4 days  lamoTRIgine 100 mg oral tablet, extended release: 1 tab(s) orally once a day  Paxil 40 mg oral tablet: 1 tab(s) orally once a day  predniSONE 50 mg oral tablet: 1 tab(s) orally once a day  Proventil HFA 90 mcg/inh inhalation aerosol: 2 puff(s) inhaled 4 times a day  Proventil HFA CFC free 90 mcg/inh inhalation aerosol: 2 puff(s) inhaled 4 times a day  Suboxone 8 mg-2 mg sublingual film: 2 each sublingual once a day  Xanax 1 mg oral tablet: 1 tab(s) orally 3 times a day, As Needed   ALPRAZolam 0.25 mg oral tablet: 1 tab(s) orally every 12 hours, As needed, Anxiety MDD:0.5mg  aspirin 81 mg oral tablet, chewable: 1 tab(s) orally once a day  atorvastatin 80 mg oral tablet: 1 tab(s) orally once a day (at bedtime)  clopidogrel 75 mg oral tablet: 1 tab(s) orally once a day  FLUoxetine 40 mg oral capsule: 1 cap(s) orally once a day  ipratropium-albuterol 0.5 mg-2.5 mg/3 mLinhalation solution: 3 milliliter(s) by nebulizer 4 times a day, As Needed   lamoTRIgine 100 mg oral tablet, extended release: 1 tab(s) orally once a day  lisinopril 10 mg oral tablet: 1 tab(s) orally once a day  metoprolol succinate 50 mg oral tablet, extended release: 1 tab(s) orally once a day  Nebulizer Compressor with kits: 1 dose(s) by nebulizer 4 times a day   nicotine 21 mg-14 mg-7 mg transdermal film, extended release: 1 each transdermally once a day

## 2021-01-07 NOTE — DISCHARGE NOTE PROVIDER - NSDCFUADDINST_GEN_ALL_CORE_FT
Restricted use with no heavy lifting of affected arm for 48 hours.  No submerging the arm in water for 48 hours.  You may start showering today.  Call your doctor for any bleeding, swelling, loss of sensation in the hand or fingers, or fingers turning blue.  If heavy bleeding or large lumps form, hold pressure at the spot and come to the Emergency Room.  Do not stop Aspirin or Plavix without speaking with cardiologist first  follow up with your primary MD within one week. Follow up with your Cardiologist in 7-10 days  Return to the Emergency dept. for any chest pain or shortness of breath

## 2021-01-07 NOTE — DISCHARGE NOTE NURSING/CASE MANAGEMENT/SOCIAL WORK - NSDCPEEMAIL_GEN_ALL_CORE
[facility] for Tobacco Control email [email or web address]

## 2021-01-07 NOTE — PROGRESS NOTE ADULT - PROVIDER SPECIALTY LIST ADULT
Cardiology
Hospitalist
MICU
Cardiology
Cardiology
Hospitalist
Cardiology

## 2021-01-07 NOTE — PROGRESS NOTE ADULT - ATTENDING COMMENTS
Anticipate discharge in 24-48 hours pending clinical course.
-stable, no event on telemetry, denies chest pain; coping with the passing of her mother, reports low dose Xanax helps with her anxiety/sleep at night  -social work follow up regarding insurance lapses and homehealth aide inquiry.   -continue ASA/clopidogrel, change to metoprolol succinate 50mg, continue losartan 25mg, atorvastatin 80mg  -nicotine patch for smoking cessation  -stable from cardiac standpoint for discharge home today, will arrange office follow up in 1-2 weeks.       Octaviano Diaz DO, Located within [facility]  Faculty Non-Invasive Cardiologist  [phone]
56 yo female active smoker admitted with stemi, s/p PCI, noted to have ischemic cardiomyopathy.  Currently pain free, doing well on goal directed medical therapy.
-reports intermittent chest pain yesterday and today, also found out her elderly mother had a fall at nursing home is now dying at [facility], as well as found out issue with her medicaid coverage, hence very anxious and crying, provided console   -social work to clarify insurance status  -plan for stage PCI to LCx tomorrow, NPO after midnight      Octaviano Diaz DO, [facility]  Faculty Non-Invasive Cardiologist  [phone]
-She is anxious feeling today, got up to use the rest room and walking in her room denies any chest pain, R-groin with walnut sized hematoma tender to touch, need manual compression  -continue DAPT with ticagrelor, if insurance not cover then use for 1 month then switch to clopidogrel, TTE pending to assess LV EF, if remain chest pain free then stage PCI of LCx as outpatient   -continue smoking cessation, reduce alcohol intake        Octaviano Diaz DO, [facility]  Faculty Non-Invasive Cardiologist  [phone]

## 2021-01-07 NOTE — DISCHARGE NOTE NURSING/CASE MANAGEMENT/SOCIAL WORK - NSDCPEWEB_GEN_ALL_CORE
[facility] for Tobacco Control website --- [email or web address] website --- [email or web address]

## 2021-01-07 NOTE — DISCHARGE NOTE NURSING/CASE MANAGEMENT/SOCIAL WORK - NSDCVIVACCINE_GEN_ALL_CORE_FT
Influenza , 2014/12/6 14:25 , Aster Graham (RN)  Pneumococcal , 2014/12/6 14:24 , Aster Graham (RN)

## 2021-01-07 NOTE — PROGRESS NOTE ADULT - SUBJECTIVE AND OBJECTIVE BOX
YORDY ARDON  ----------------------------------------  The patient was seen and evaluated for STEMI. Offers no complaints. Denied chest pain or dyspnea.    Vital Signs Last 24 Hrs  T(C): 36.7 (07 Jan 2021 09:40), Max: 37.2 (06 Jan 2021 20:28)  T(F): 98 (07 Jan 2021 09:40), Max: 99 (06 Jan 2021 20:28)  HR: 73 (07 Jan 2021 09:40) (65 - 75)  BP: 124/71 (07 Jan 2021 09:35) (107/71 - 125/86)  BP(mean): --  RR: 18 (07 Jan 2021 09:40) (18 - 18)  SpO2: 97% (07 Jan 2021 09:40) (92% - 98%)    PHYSICAL EXAMINATION:  ----------------------------------------  General appearance: No acute distress, Awake, Alert  HEENT: Normocephalic, Atraumatic, Conjunctiva clear, EOMI  Neck: Supple, No JVD, No tenderness  Lungs: Breath sound equal bilaterally, No wheezes, No rales  Cardiovascular: S1S2, Regular rhythm  Abdomen: Soft, Nontender, Nondistended, No guarding/rebound, Positive bowel sounds  Extremities: No clubbing, No cyanosis, No edema, No calf tenderness  Neuro: Strength equal bilaterally, No tremors  Psychiatric: Appropriate mood, Normal affect    LABORATORY STUDIES:  ----------------------------------------             11.3   9.97  )-----------( 355      ( 07 Jan 2021 09:54 )             36.1     01-07    140  |  105  |  13.0  ----------------------------<  87  3.9   |  24.0  |  0.56    Ca    8.9      07 Jan 2021 09:54  Mg     2.0     01-07    MEDICATIONS  (STANDING):  albuterol/ipratropium for Nebulization 3 milliLiter(s) Nebulizer every 6 hours  aspirin  chewable 81 milliGRAM(s) Oral daily  atorvastatin 80 milliGRAM(s) Oral at bedtime  budesonide 160 MICROgram(s)/formoterol 4.5 MICROgram(s) Inhaler 2 Puff(s) Inhalation two times a day  chlorhexidine 4% Liquid 1 Application(s) Topical <User Schedule>  clopidogrel Tablet 75 milliGRAM(s) Oral daily  heparin   Injectable 5000 Unit(s) SubCutaneous every 8 hours  lamoTRIgine 100 milliGRAM(s) Oral daily  losartan 25 milliGRAM(s) Oral daily  metoprolol succinate ER 50 milliGRAM(s) Oral daily  nicotine - 21 mG/24Hr(s) Patch 1 patch Transdermal daily    MEDICATIONS  (PRN):  ALBUTerol    90 MICROgram(s) HFA Inhaler 2 Puff(s) Inhalation every 4 hours PRN Shortness of Breath  ALPRAZolam 0.25 milliGRAM(s) Oral every 12 hours PRN Anxiety  nitroglycerin     SubLingual 0.4 milliGRAM(s) SubLingual every 5 minutes PRN Chest Pain      ASSESSMENT / PLAN:  ----------------------------------------  55F with a history of COPD, hypertension, and anxiety who presented with chest discomfort and was noted to have an STEMI requiring cardiac catheterization and percutaneous coronary intervention to the right coronary artery. She had recurrent chest discomfort and underwent staged cardiac catheterization.    STEMI / Coronary artery disease - Status post percutaneous coronary intervention to the RCA and circumflex. On clopidogrel, aspirin, and atorvastatin.    COPD - No dyspnea or wheezing noted on examination. On budesonide. Inhaled bronchodilator therapy as needed. Smoking cessation.    Hypertension - Close blood pressure monitoring. On losartan and metoprolol.    Anxiety - On lamotrigine.    Leukocytosis - Afebrile. No obvious signs of infection. Now resolved.

## 2021-01-07 NOTE — DISCHARGE NOTE NURSING/CASE MANAGEMENT/SOCIAL WORK - PATIENT PORTAL LINK FT
You can access the FollowMyHealth Patient Portal offered by [facility] by registering at the following website: [email or web address]. By joining [employer]’s FollowMyHealth portal, you will also be able to view your health information using other applications (apps) compatible with our system.

## 2021-01-07 NOTE — PROGRESS NOTE ADULT - SUBJECTIVE AND OBJECTIVE BOX
SUBJECTIVE:  Cardiology NP F/U note:  SP: C which revealed: Staged:   Severe circumflex disease. PCI with 1 TATY.  denies complaints of chest pain/sob/dizziness/palps  pt emotional as she was informed her mother just passed away  kit diet / OOB       	  MEDICATIONS  (STANDING):  albuterol/ipratropium for Nebulization 3 milliLiter(s) Nebulizer every 6 hours  aspirin  chewable 81 milliGRAM(s) Oral daily  atorvastatin 80 milliGRAM(s) Oral at bedtime  budesonide 160 MICROgram(s)/formoterol 4.5 MICROgram(s) Inhaler 2 Puff(s) Inhalation two times a day  chlorhexidine 4% Liquid 1 Application(s) Topical <User Schedule>  clopidogrel Tablet 75 milliGRAM(s) Oral daily  heparin   Injectable 5000 Unit(s) SubCutaneous every 8 hours  lamoTRIgine 100 milliGRAM(s) Oral daily  losartan 25 milliGRAM(s) Oral daily  metoprolol succinate ER 50 milliGRAM(s) Oral daily  nicotine - 21 mG/24Hr(s) Patch 1 patch Transdermal daily    MEDICATIONS  (PRN):  ALBUTerol    90 MICROgram(s) HFA Inhaler 2 Puff(s) Inhalation every 4 hours PRN Shortness of Breath  ALPRAZolam 0.25 milliGRAM(s) Oral every 12 hours PRN Anxiety  nitroglycerin     SubLingual 0.4 milliGRAM(s) SubLingual every 5 minutes PRN Chest Pain      PHYSICAL EXAM:    T(C): 36.7 (21 @ 09:40), Max: 37.2 (21 @ 20:28)  HR: 73 (21 @ 09:40) (65 - 75)  BP: 124/71 (21 @ 09:35) (107/71 - 125/86)  RR: 18 (21 @ 09:40) (18 - 18)  SpO2: 97% (21 @ 09:40) (92% - 98%)  Wt(kg): --    I&O's Summary    2021 07:01  -  2021 07:00  --------------------------------------------------------  IN: 227 mL / OUT: 0 mL / NET: 227 mL        Daily     Daily Weight in k.9 (2021 06:49)    Appearance: NAD	  HEENT:   Normal oral mucosa,   Lymphatic: No lymphadenopathy  Cardiovascular: Normal S1 S2,RRR 70  No JVD, No murmurs, No edema  Respiratory: Lungs clear to auscultation	  Gastrointestinal:  Soft, Non-tender, + BS	  Skin: warm and dry  Neurologic: Non-focal  Extremities: Normal range of motion,:  Right Radial no hematoma / good pulse / dressing removed    Vascular: Peripheral pulses palpable 2+ bilaterally    TELEMETRY: RSR 70's no events	    ECG: RSR 61/ NSSTTW changes / no acute changes  	  RADIOLOGY:   DIAGNOSTIC TESTING:  [ ] Echocardiogram:    < from: TTE Echo Complete w/ Contrast w/ Doppler (21 @ 17:40) >  . Endocardial visualization was enhanced with intravenous echo contrast.   2. Mildly enlarged left atrium.   3. Segmental wall motion abnormalities in Left circumflex territory. (dominant Cx).   4. Left ventricular ejection fraction, by visual estimation, is 50 to 55%.   5. The right atrium is normal in size.   6. Normal right ventricular size and function.   7. No significant valvular abnormality.   8. Trivial pericardial effusion.    [ ]  Catheterization:    < from: Cardiac Cath Lab - Adult (21 @ 11:00) >  INTERVENTIONAL RECOMMENDATIONS: Left heart catheterization demonstrated  severe disease of the LCX. The LAD has mild nonobstructive disease.  The RCA is occluded in the distal segment, just prior to the PDA and PLV  bifurcation.  The d LVEF is reduced at 40 - 45% with inferior wall hypokinesis.  Performed successful thrombectomy, PTCA and stenting to the RCA.  Very good results.  There is some distal thrombus noted in the PLV.     	    < from: Cardiac Cath Lab - Adult (21 @ 07:09) >  VENTRICLES: No LV gram was performed; however, arecent echocardiogram  demonstrated an EF of 45 %.  CORONARY VESSELS: The coronary circulation is right dominant.  LM:   --  LM: Normal.  LAD:   --  Distal LAD: There was a 20 % stenosis.  CX:   --  Mid circumflex: There was a diffuse 80 % stenosis.  RCA:   --  Proximal RCA: There was a 20 % stenosis.  --  Mid RCA: There was a 20 % stenosis.  --  Distal RCA: There was a 0 % stenosis in-stent.  COMPLICATIONS: No complications occurred during the cath lab visit. No  complications occurred during the cath lab visit.  DIAGNOSTIC IMPRESSIONS: Severe circumflex disease. PCI with 1 TATY.  DIAGNOSTIC RECOMMENDATIONS: Consider switching to plavix prior to  discharge.    < end of copied text >  CARDIAC MARKERS: Positive       ASSESSMENT:  55 yr old woman, current active 1PPD smoker, with PMH of COPD, generalized anxiety, asthma, lung nodule, that presented with 1 day history of midsternal chest pain associated with nausea and vomiting. Chest pain started yesterday and progressively got worse and never really went away, today it significantly got worse after being denied a covid test at an urgent care center which triggered an anxiety attack. Upon arrival to ED pt had evolving EKG with pathologic Q in V1, ST depressions in III, AVF. Troponin was 1, Patient brought to cath lab for STEMI, now s/p LHC which showed reduced EF 40-45% w/ inferior wall hypokinesis, mCirc 80% Awais grade 3 no intervention, dRCA 100% w/ successful thrombectomy/ PCI x 1 2.5 x 12 mm KALEY TATY.    -SP STEMI   -SP [facility] 1/3/21 as above with TATY RCA   -SP [facility]  21: staged TATY x1 to CX   -Hemodynamically stable withoout cp arrhythmias    Plan:  continue current meds ASA/ Plavix was switched due to ins issues/BB/ statin/ARB  med compliance/ smoking cessation / follow up with Dr. Diaz and [facility] discussed  cardiac rehab info provided/referral and communication to cardiac rehab completed

## 2021-01-08 ENCOUNTER — NON-APPOINTMENT (OUTPATIENT)
Age: 56
End: 2021-01-08

## 2021-01-08 DIAGNOSIS — Z86.59 PERSONAL HISTORY OF OTHER MENTAL AND BEHAVIORAL DISORDERS: ICD-10-CM

## 2021-01-08 PROBLEM — I10 ESSENTIAL (PRIMARY) HYPERTENSION: Chronic | Status: ACTIVE | Noted: 2021-01-03

## 2021-01-12 ENCOUNTER — NON-APPOINTMENT (OUTPATIENT)
Age: 56
End: 2021-01-12

## 2021-01-12 ENCOUNTER — APPOINTMENT (OUTPATIENT)
Dept: CARDIOLOGY | Facility: CLINIC | Age: 56
End: 2021-01-12
Payer: MEDICARE

## 2021-01-12 VITALS
BODY MASS INDEX: 21.69 KG/M2 | HEART RATE: 67 BPM | TEMPERATURE: 98.8 F | SYSTOLIC BLOOD PRESSURE: 120 MMHG | OXYGEN SATURATION: 95 % | DIASTOLIC BLOOD PRESSURE: 74 MMHG | HEIGHT: 66 IN | WEIGHT: 135 LBS

## 2021-01-12 VITALS — SYSTOLIC BLOOD PRESSURE: 122 MMHG | DIASTOLIC BLOOD PRESSURE: 78 MMHG

## 2021-01-12 VITALS — DIASTOLIC BLOOD PRESSURE: 78 MMHG | SYSTOLIC BLOOD PRESSURE: 122 MMHG

## 2021-01-12 PROCEDURE — 99072 ADDL SUPL MATRL&STAF TM PHE: CPT

## 2021-01-12 PROCEDURE — 99214 OFFICE O/P EST MOD 30 MIN: CPT

## 2021-01-12 PROCEDURE — 93000 ELECTROCARDIOGRAM COMPLETE: CPT

## 2021-01-12 NOTE — REVIEW OF SYSTEMS
[Fever] : no fever [Chills] : no chills [Blurry Vision] : no blurred vision [Earache] : no earache [Shortness Of Breath] : no shortness of breath [Dyspnea on exertion] : not dyspnea during exertion [Chest  Pressure] : no chest pressure [Chest Pain] : no chest pain [Palpitations] : no palpitations [Cough] : no cough [Abdominal Pain] : no abdominal pain [Dysuria] : no dysuria [Joint Pain] : no joint pain [Skin: A Rash] : no rash: [Dizziness] : no dizziness [Confusion] : no confusion was observed [Anxiety] : anxiety [Excessive Thirst] : no polydipsia [Easy Bleeding] : no tendency for easy bleeding

## 2021-01-12 NOTE — HISTORY OF PRESENT ILLNESS
[FreeTextEntry1] : 55F h/o chronic active smoker, COPD, lung nodule anxiety, that presented to [facility] on 1/3/21 with 1 day history of midsternal chest pain associated with nausea and vomiting, CODE STEMI activated for acute inferior wall MI, initial Trop 1.04 and found with dRCA 100% s/p TATY x1, had staged PCI/DESx1 to 80% LCx on 1/6/21, LV EF ~45%, now presents for initial outpatient cardiology visit. \par \par She denies any chest pain or discomfort, ambulating well, also is coping well with the recent passing of her elderly mother, planning to spend sometime with her father, reports anxiety been lessened with use of Xanax, not been seeing a psychiatrist for few years, reports no much effect from fluoxetine/lamotrigine use. Denies any bleeding with DAPT use. \par \par \par A1c 4.8%\par , , HDL 59, \par pBNP 1140

## 2021-01-12 NOTE — PHYSICAL EXAM
[General Appearance - Well Developed] : well developed [Normal Conjunctiva] : the conjunctiva exhibited no abnormalities [Heart Rate And Rhythm] : heart rate and rhythm were normal [Heart Sounds] : normal S1 and S2 [Murmurs] : no murmurs present [Edema] : no peripheral edema present [Respiration, Rhythm And Depth] : normal respiratory rhythm and effort [] : no respiratory distress [Exaggerated Use Of Accessory Muscles For Inspiration] : no accessory muscle use [Bowel Sounds] : normal bowel sounds [Abdomen Soft] : soft [Abdomen Tenderness] : non-tender [Abnormal Walk] : normal gait [Nail Clubbing] : no clubbing of the fingernails [FreeTextEntry1] : R-radial +1, R-groin no hematoma [Skin Color & Pigmentation] : normal skin color and pigmentation [Oriented To Time, Place, And Person] : oriented to person, place, and time [Affect] : the affect was normal [Mood] : the mood was normal

## 2021-01-12 NOTE — DISCUSSION/SUMMARY
[FreeTextEntry1] : 55F h/o chronic active smoker, COPD, lung nodule anxiety, that presented to [facility] on 1/3/21 with 1 day history of midsternal chest pain associated with nausea and vomiting, CODE STEMI activated for acute inferior wall MI, initial Trop 1.04 and found with dRCA 100% s/p TATY x1, had staged PCI/TATY to 80% LCx on 1/6/21, LV EF ~45%, now presents for initial outpatient cardiology visit. \par \par Recent inferior STEMI with staged PCI/TATY doing well since discharge, vitals stable, adherent with DAPT/medications, off cigarette smoking. \par \par 1. CAD/inferior wall MI with TATY to dRCA and LCx- continue ASA 81mg and clopidogrel 75mg least 1 year, would benefit for long term DAPT use; continue atorvastatin 80mg, LDL goal <70 will repeat fasting lipid on next follow up; continue metoprolol, referral for cardiac rehab. Continue nicotine patch for smoking cessation. \par \par 2. HTN- BP controlled on lisinopril 10mg\par \par 3. H/o COPD- on inhalers, no longer follow up with pulmonary consider reestablish care. \par \par 4. Anxiety- continue low dose Xanax PRN, on fluoxetine/lamotrigine, referral to establish care with new psychiatrist. \par \par \par She requests referral for new PMD as well. \par \par Follow up in 3 months.

## 2021-01-21 PROCEDURE — 83880 ASSAY OF NATRIURETIC PEPTIDE: CPT

## 2021-01-21 PROCEDURE — 84484 ASSAY OF TROPONIN QUANT: CPT

## 2021-01-21 PROCEDURE — U0005: CPT

## 2021-01-21 PROCEDURE — C1894: CPT

## 2021-01-21 PROCEDURE — 85730 THROMBOPLASTIN TIME PARTIAL: CPT

## 2021-01-21 PROCEDURE — 93005 ELECTROCARDIOGRAM TRACING: CPT

## 2021-01-21 PROCEDURE — C8929: CPT

## 2021-01-21 PROCEDURE — 99153 MOD SED SAME PHYS/QHP EA: CPT

## 2021-01-21 PROCEDURE — 99285 EMERGENCY DEPT VISIT HI MDM: CPT | Mod: 25

## 2021-01-21 PROCEDURE — C9460: CPT

## 2021-01-21 PROCEDURE — C9606: CPT | Mod: RC

## 2021-01-21 PROCEDURE — 83735 ASSAY OF MAGNESIUM: CPT

## 2021-01-21 PROCEDURE — 36415 COLL VENOUS BLD VENIPUNCTURE: CPT

## 2021-01-21 PROCEDURE — 86803 HEPATITIS C AB TEST: CPT

## 2021-01-21 PROCEDURE — 83605 ASSAY OF LACTIC ACID: CPT

## 2021-01-21 PROCEDURE — 85025 COMPLETE CBC W/AUTO DIFF WBC: CPT

## 2021-01-21 PROCEDURE — 85610 PROTHROMBIN TIME: CPT

## 2021-01-21 PROCEDURE — C1725: CPT

## 2021-01-21 PROCEDURE — 80053 COMPREHEN METABOLIC PANEL: CPT

## 2021-01-21 PROCEDURE — C1887: CPT

## 2021-01-21 PROCEDURE — C1757: CPT

## 2021-01-21 PROCEDURE — 85027 COMPLETE CBC AUTOMATED: CPT

## 2021-01-21 PROCEDURE — 84145 PROCALCITONIN (PCT): CPT

## 2021-01-21 PROCEDURE — U0003: CPT

## 2021-01-21 PROCEDURE — 93454 CORONARY ARTERY ANGIO S&I: CPT | Mod: 59

## 2021-01-21 PROCEDURE — 0225U NFCT DS DNA&RNA 21 SARSCOV2: CPT

## 2021-01-21 PROCEDURE — C1769: CPT

## 2021-01-21 PROCEDURE — 80061 LIPID PANEL: CPT

## 2021-01-21 PROCEDURE — 82550 ASSAY OF CK (CPK): CPT

## 2021-01-21 PROCEDURE — 82553 CREATINE MB FRACTION: CPT

## 2021-01-21 PROCEDURE — 84443 ASSAY THYROID STIM HORMONE: CPT

## 2021-01-21 PROCEDURE — 83036 HEMOGLOBIN GLYCOSYLATED A1C: CPT

## 2021-01-21 PROCEDURE — C1874: CPT

## 2021-01-21 PROCEDURE — C9600: CPT | Mod: LC

## 2021-01-21 PROCEDURE — 71045 X-RAY EXAM CHEST 1 VIEW: CPT

## 2021-01-21 PROCEDURE — 99152 MOD SED SAME PHYS/QHP 5/>YRS: CPT

## 2021-01-21 PROCEDURE — 94640 AIRWAY INHALATION TREATMENT: CPT

## 2021-01-21 PROCEDURE — 84100 ASSAY OF PHOSPHORUS: CPT

## 2021-01-21 PROCEDURE — 93458 L HRT ARTERY/VENTRICLE ANGIO: CPT | Mod: 59

## 2021-01-21 PROCEDURE — 86769 SARS-COV-2 COVID-19 ANTIBODY: CPT

## 2021-01-21 PROCEDURE — 80048 BASIC METABOLIC PNL TOTAL CA: CPT

## 2021-02-22 ENCOUNTER — EMERGENCY (EMERGENCY)
Facility: HOSPITAL | Age: 56
LOS: 1 days | Discharge: HOME | End: 2021-02-22
Attending: EMERGENCY MEDICINE
Payer: COMMERCIAL

## 2021-02-22 VITALS
HEART RATE: 77 BPM | HEIGHT: 66 IN | DIASTOLIC BLOOD PRESSURE: 71 MMHG | RESPIRATION RATE: 20 BRPM | SYSTOLIC BLOOD PRESSURE: 110 MMHG | TEMPERATURE: 98 F | WEIGHT: 130.07 LBS | OXYGEN SATURATION: 96 %

## 2021-02-22 DIAGNOSIS — S42.009A FRACTURE OF UNSPECIFIED PART OF UNSPECIFIED CLAVICLE, INITIAL ENCOUNTER FOR CLOSED FRACTURE: Chronic | ICD-10-CM

## 2021-02-22 PROCEDURE — 29125 APPL SHORT ARM SPLINT STATIC: CPT | Mod: RT

## 2021-02-22 PROCEDURE — 73110 X-RAY EXAM OF WRIST: CPT | Mod: 26,RT

## 2021-02-22 PROCEDURE — 73090 X-RAY EXAM OF FOREARM: CPT | Mod: 26,RT

## 2021-02-22 PROCEDURE — 99284 EMERGENCY DEPT VISIT MOD MDM: CPT | Mod: 25

## 2021-02-22 PROCEDURE — 73090 X-RAY EXAM OF FOREARM: CPT

## 2021-02-22 PROCEDURE — 73110 X-RAY EXAM OF WRIST: CPT

## 2021-02-22 RX ORDER — OXYCODONE AND ACETAMINOPHEN 5; 325 MG/1; MG/1
1 TABLET ORAL ONCE
Refills: 0 | Status: DISCONTINUED | OUTPATIENT
Start: 2021-02-22 | End: 2021-02-22

## 2021-02-22 RX ORDER — LAMOTRIGINE 25 MG/1
1 TABLET, ORALLY DISINTEGRATING ORAL
Qty: 14 | Refills: 0
Start: 2021-02-22 | End: 2021-03-23

## 2021-02-22 RX ORDER — FLUOXETINE HCL 10 MG
1 CAPSULE ORAL
Qty: 14 | Refills: 0
Start: 2021-02-22 | End: 2021-03-23

## 2021-02-22 RX ADMIN — OXYCODONE AND ACETAMINOPHEN 1 TABLET(S): 5; 325 TABLET ORAL at 11:47

## 2021-02-22 NOTE — ED STATDOCS - ATTENDING CONTRIBUTION TO CARE
I,Santy Peterson MD,  performed the initial face to face bedside interview with this patient regarding history of present illness, review of symptoms and relevant past medical, social and family history.  I completed an independent physical examination.  I was the initial provider who evaluated this patient. I have signed out the follow up of any pending tests (i.e. labs, radiological studies) to the ACP.  I have communicated the patient’s plan of care and disposition with the ACP.  The history, relevant review of systems, past medical and surgical history, medical decision making, and physical examination was documented by the scribe in my presence and I attest to the accuracy of the documentation.

## 2021-02-22 NOTE — ED STATDOCS - CARE PROVIDER_API CALL
Katelyn Syed (DO)  Orthopaedic Surgery  [address] [place] [address]  Phone: [phone]  Fax: [phone]  Follow Up Time:

## 2021-02-22 NOTE — ED STATDOCS - CARE PLAN
Principal Discharge DX:	Other closed extra-articular fracture of distal end of left radius, initial encounter   Principal Discharge DX:	Other closed fracture of distal end of right radius, initial encounter

## 2021-02-22 NOTE — ED STATDOCS - OBJECTIVE STATEMENT
56 y/o with PMHx of anxiety, asthma, COPD and HTN presents to ED c/o left arm pain s/p fall in [employer] parking lot yesterday. Pt is on aspirin and Plavix and was here last year for a heart attack. 56 y/o F with PMHx of anxiety, asthma, COPD and HTN presents to ED c/o left arm pain s/p fall in [employer] parking lot yesterday. Pt is on aspirin and Plavix and was here last year for a heart attack. 56 y/o F with PMHx of anxiety, asthma, COPD and HTN presents to ED c/o righ arm pain s/p fall in [employer] parking lot yesterday. Pt is on aspirin and Plavix and was here last year for a heart attack.

## 2021-02-22 NOTE — ED STATDOCS - PROGRESS NOTE DETAILS
patient with left arm pain s/p FOOSH, positive non displaced radius fx  will place splint on and dc patient with right arm pain s/p FOOSH, positive non displaced radius fx  will place splint on and dc

## 2021-02-22 NOTE — ED STATDOCS - MUSCULOSKELETAL, MLM
left wrist and forearm tenderness, ecchymosis lower aspect, good radial and ulna pulses right wrist and forearm tenderness, ecchymosis lower aspect, good radial and ulna pulses

## 2021-02-22 NOTE — ED STATDOCS - PRINCIPAL DIAGNOSIS
Other closed extra-articular fracture of distal end of left radius, initial encounter Other closed fracture of distal end of right radius, initial encounter

## 2021-02-22 NOTE — ED STATDOCS - PMH
Anxiety    Asthma    Chronic obstructive pulmonary disease, unspecified COPD type    HTN (hypertension)

## 2021-02-22 NOTE — ED STATDOCS - NSFOLLOWUPINSTRUCTIONS_ED_ALL_ED_FT
keep splint clean and dry   Non weight bearing left arm   follow up orthopedics for eval   return to ER if any increase in symptoms   motrin for pain keep splint clean and dry   Non weight bearing right arm   follow up orthopedics for eval   return to ER if any increase in symptoms   motrin for pain

## 2021-02-22 NOTE — ED STATDOCS - PATIENT PORTAL LINK FT
You can access the FollowMyHealth Patient Portal offered by [facility] by registering at the following website: [email or web address]. By joining [employer]’s FollowMyHealth portal, you will also be able to view your health information using other applications (apps) compatible with our system.

## 2021-02-24 ENCOUNTER — APPOINTMENT (OUTPATIENT)
Dept: ORTHOPEDIC SURGERY | Facility: CLINIC | Age: 56
End: 2021-02-24
Payer: MEDICARE

## 2021-02-24 VITALS
BODY MASS INDEX: 21.69 KG/M2 | WEIGHT: 135 LBS | DIASTOLIC BLOOD PRESSURE: 89 MMHG | HEIGHT: 66 IN | SYSTOLIC BLOOD PRESSURE: 141 MMHG | HEART RATE: 64 BPM

## 2021-02-24 VITALS — TEMPERATURE: 97.1 F

## 2021-02-24 DIAGNOSIS — S52.551A OTHER EXTRAARTICULAR FRACTURE OF LOWER END OF RIGHT RADIUS, INITIAL ENCOUNTER FOR CLOSED FRACTURE: ICD-10-CM

## 2021-02-24 DIAGNOSIS — M25.531 PAIN IN RIGHT WRIST: ICD-10-CM

## 2021-02-24 PROCEDURE — 99204 OFFICE O/P NEW MOD 45 MIN: CPT | Mod: 57

## 2021-02-24 PROCEDURE — 25600 CLTX DST RDL FX/EPHYS SEP WO: CPT | Mod: RT

## 2021-02-24 NOTE — PHYSICAL EXAM
[de-identified] : Physical Exam:\par General: Well appearing, no acute distress, A&O\par Neurologic: A&Ox3, No focal deficits\par Head: NCAT without abrasions, lacerations, or ecchymosis to head, face, or scalp\par Respiratory: Equal chest wall expansion bilaterally, no accessory muscle use\par Lymphatic: No lymphadenopathy palpated\par Skin: Warm and dry\par Psychiatric: Normal mood and affect\par \par RUE:\par SILT r/m/u\par Fires AIN/PIN/ulnar\par 2+ radial pulse\par brisk capillary refill\par Positive tenderness to palpation distal radius\par Positive ecchymosis distal radius [de-identified] : Plain films of the right wrist were previously obtained and reviewed today.  There is a buckle type fracture of the right distal radius.  Articular surface is anatomic

## 2021-02-24 NOTE — HISTORY OF PRESENT ILLNESS
[de-identified] : The patient is a pleasant 55-year-old female presents today for evaluation of right wrist pain.  She suffered a fall on ice over the weekend and landed on her outstretched wrist.  She was seen at [facility] emergency department.  X-rays were obtained and a buckle fracture of the right distal radius was diagnosed.  She was placed into a splint.  She comes in today for evaluation.  The patient states the pain is made worse with activity and relieved with rest.  Dull, 3 out of 10 [Bending] : worsened by bending [Lifting] : worsened by lifting [Weight Bearing] : worsened by weight bearing [Recumbency] : relieved by recumbency [Rest] : relieved by rest

## 2021-02-24 NOTE — DISCUSSION/SUMMARY
[de-identified] : The patient presents today with clinical exam findings and radiographic imaging consistent with a minimally displaced distal radius fracture. Based on the fracture pattern and the wrist stability, it does not require surgical intervention. The joint appears quite stable and as such should be able to be treated nonoperatively.  We will place her into a Starr cast brace today which she can remove for hygiene but should wear at the rest of the day.  Should come back in about 3 to 4 weeks with repeat x-rays at which time we will likely start occupational therapy to work on gentle range of motion.\par \par The patient was given the opportunity to ask questions and all questions were answered to their satisfaction.\par \par Eduard Lara MD\par Orthopaedic Trauma Surgeon\par [facility]\par [facility] [place]\par Director Orthopaedic Trauma, [facility], [place]\par \par \par \par

## 2021-03-04 ENCOUNTER — APPOINTMENT (OUTPATIENT)
Dept: FAMILY MEDICINE | Facility: CLINIC | Age: 56
End: 2021-03-04

## 2021-03-24 ENCOUNTER — APPOINTMENT (OUTPATIENT)
Dept: ORTHOPEDIC SURGERY | Facility: CLINIC | Age: 56
End: 2021-03-24
Payer: MEDICARE

## 2021-03-24 VITALS
BODY MASS INDEX: 21.69 KG/M2 | HEART RATE: 90 BPM | HEIGHT: 66 IN | SYSTOLIC BLOOD PRESSURE: 128 MMHG | DIASTOLIC BLOOD PRESSURE: 80 MMHG | WEIGHT: 135 LBS

## 2021-03-24 VITALS — TEMPERATURE: 96.1 F

## 2021-03-24 DIAGNOSIS — S52.551D OTHER EXTRAARTICULAR FRACTURE OF LOWER END OF RIGHT RADIUS, SUBSEQUENT ENCOUNTER FOR CLOSED FRACTURE WITH ROUTINE HEALING: ICD-10-CM

## 2021-03-24 PROCEDURE — 99024 POSTOP FOLLOW-UP VISIT: CPT

## 2021-03-24 PROCEDURE — 73110 X-RAY EXAM OF WRIST: CPT | Mod: RT

## 2021-03-24 PROCEDURE — 99072 ADDL SUPL MATRL&STAF TM PHE: CPT

## 2021-03-24 NOTE — DISCUSSION/SUMMARY
[de-identified] : The patient presents today with clinical exam findings and radiographic imaging consistent with a minimally displaced distal radius fracture. Based on the fracture pattern and the wrist stability, it does not require surgical intervention. The joint appears quite stable and as such should be able to be treated nonoperatively.  We will continue with the Starr cast brace today. we will likely start occupational therapy to work on gentle range of motion out of the brace.  In 1 month she can remove the brace altogether.\par \par The patient was given the opportunity to ask questions and all questions were answered to their satisfaction.\par \par Eduard Lara MD\par Orthopaedic Trauma Surgeon\par [facility] [place]\par [facility] [place]\par Director Orthopaedic Trauma, [facility]\par \par \par \par

## 2021-03-24 NOTE — PHYSICAL EXAM
[de-identified] : Physical Exam:\par General: Well appearing, no acute distress, A&O\par Neurologic: A&Ox3, No focal deficits\par Head: NCAT without abrasions, lacerations, or ecchymosis to head, face, or scalp\par Respiratory: Equal chest wall expansion bilaterally, no accessory muscle use\par Lymphatic: No lymphadenopathy palpated\par Skin: Warm and dry\par Psychiatric: Normal mood and affect\par \par RUE:\par SILT r/m/u\par Fires AIN/PIN/ulnar\par 2+ radial pulse\par brisk capillary refill\par Positive tenderness to palpation distal radius [de-identified] : Plain films of the right wrist were obtained and reviewed today.  There is a buckle type fracture of the right distal radius.  Articular surface is anatomic.  No loss of alignment from earlier films

## 2021-03-24 NOTE — HISTORY OF PRESENT ILLNESS
[de-identified] : The patient is a pleasant 55-year-old female presents today for follow-up  evaluation of right wrist pain.  She suffered a fall on ice last month and landed on her outstretched wrist.  She was seen at [facility] emergency department.  X-rays were obtained and a buckle fracture of the right distal radius was diagnosed.  She was placed into a splint.  At her last visit she was placed into a Starr brace.  The patient states the pain is made worse with activity and relieved with rest.  Dull, 3 out of 10

## 2021-04-11 NOTE — HISTORY OF PRESENT ILLNESS
[FreeTextEntry1] : 55F h/o chronic active smoker, COPD, lung nodule anxiety, that presented to [facility]ED on 1/3/21 with 1 day history of midsternal chest pain associated with nausea and vomiting, CODE STEMI activated for acute inferior wall MI, initial Trop 1.04 and found with dRCA 100% s/p TATY x1, had staged PCI/DESx1 to 80% LCx on 1/6/21, LV EF ~45%, presented for initial outpatient cardiology visit see on 1/2021 notable anxiety symptoms given short course of Xanax as needed advisd to follow up with pyschiatrist, interval history with fall on ice 2.2021 with R-wrist fracture required splint/brace . \par \par She denies any chest pain or discomfort, ambulating well, also is coping well with the recent passing of her elderly mother, planning to spend sometime with her father, reports anxiety been lessened with use of Xanax, not been seeing a psychiatrist for few years, reports no much effect from fluoxetine/lamotrigine use. Denies any bleeding with DAPT use. \par \par \par A1c 4.8%\par , , HDL 59, \par pBNP 1140

## 2021-04-11 NOTE — DISCUSSION/SUMMARY
[FreeTextEntry1] : 55F h/o chronic active smoker, COPD, lung nodule anxiety, that presented to [facility]-ED on 1/3/21 with 1 day history of midsternal chest pain associated with nausea and vomiting, CODE STEMI activated for acute inferior wall MI, initial Trop 1.04 and found with dRCA 100% s/p TATY x1, had staged PCI/TATY to 80% LCx on 1/6/21, LV EF ~45%, now presents for initial outpatient cardiology visit. \par \par Recent inferior STEMI with staged PCI/TATY doing well since discharge, vitals stable, adherent with DAPT/medications, off cigarette smoking. \par \par 1. CAD/inferior wall MI with TATY to dRCA and LCx- continue ASA 81mg and clopidogrel 75mg least 1 year, would benefit for long term DAPT use; continue atorvastatin 80mg, LDL goal <70 will repeat fasting lipid on next follow up; continue metoprolol, referral for cardiac rehab. Continue nicotine patch for smoking cessation. \par \par 2. HTN- BP controlled on lisinopril 10mg\par \par 3. H/o COPD- on inhalers, no longer follow up with pulmonary consider reestablish care. \par \par 4. Anxiety- continue low dose Xanax PRN, on fluoxetine/lamotrigine, referral to establish care with new psychiatrist. \par \par \par She requests referral for new PMD as well. \par \par Follow up in 3 months.

## 2021-04-12 ENCOUNTER — APPOINTMENT (OUTPATIENT)
Dept: CARDIOLOGY | Facility: CLINIC | Age: 56
End: 2021-04-12
Payer: MEDICARE

## 2021-04-18 NOTE — DISCUSSION/SUMMARY
[FreeTextEntry1] : 55F h/o chronic active smoker, COPD, lung nodule anxiety, that presented to [facility]-ED on 1/3/21 with 1 day history of midsternal chest pain associated with nausea and vomiting, CODE STEMI activated for acute inferior wall MI, initial Trop 1.04 and found with dRCA 100% s/p TATY x1, had staged PCI/TATY to 80% LCx on 1/6/21, LV EF ~45%, now presents for initial outpatient cardiology visit. \par \par Recent inferior STEMI with staged PCI/TATY doing well since discharge, vitals stable, adherent with DAPT/medications, off cigarette smoking. \par \par 1. CAD/inferior wall MI with TATY to dRCA and LCx- continue ASA 81mg and clopidogrel 75mg least 1 year, would benefit for long term DAPT use; continue atorvastatin 80mg, LDL goal <70 will repeat fasting lipid on next follow up; continue metoprolol, referral for cardiac rehab. Continue nicotine patch for smoking cessation. \par \par 2. HTN- BP controlled on lisinopril 10mg\par \par 3. H/o COPD- on inhalers, no longer follow up with pulmonary consider reestablish care. \par \par 4. Anxiety- continue low dose Xanax PRN, on fluoxetine/lamotrigine, referral to establish care with new psychiatrist. \par \par \par She requests referral for new PMD as well. \par \par Follow up in 3 months.

## 2021-04-18 NOTE — HISTORY OF PRESENT ILLNESS
[FreeTextEntry1] : 55F h/o chronic active smoker, COPD, lung nodule anxiety, that presented to [facility]-ED on 1/3/21 with 1 day history of midsternal chest pain associated with nausea and vomiting, CODE STEMI activated for acute inferior wall MI, initial Trop 1.04 and found with dRCA 100% s/p TATY x1, had staged PCI/DESx1 to 80% LCx on 1/6/21, LV EF ~45%, presented for initial outpatient cardiology visit see on 1/2021 notable anxiety symptoms given short course of Xanax as needed advisd to follow up with pyschiatrist, interval history with fall on ice 2.2021 with R-wrist fracture required splint/brace . \par \par She denies any chest pain or discomfort, ambulating well, also is coping well with the recent passing of her elderly mother, planning to spend sometime with her father, reports anxiety been lessened with use of Xanax, not been seeing a psychiatrist for few years, reports no much effect from fluoxetine/lamotrigine use. Denies any bleeding with DAPT use. \par \par \par A1c 4.8%\par , , HDL 59, \par pBNP 1140

## 2021-04-19 ENCOUNTER — APPOINTMENT (OUTPATIENT)
Dept: CARDIOLOGY | Facility: CLINIC | Age: 56
End: 2021-04-19
Payer: MEDICARE

## 2021-04-25 NOTE — DISCUSSION/SUMMARY
[FreeTextEntry1] : 55F h/o chronic active smoker, COPD, lung nodule anxiety, that presented to [facility]-ED on 1/3/21 with 1 day history of midsternal chest pain associated with nausea and vomiting, CODE STEMI activated for acute inferior wall MI, initial Trop 1.04 and found with dRCA 100% s/p TATY x1, had staged PCI/TATY to 80% LCx on 1/6/21, LV EF ~45%, now presents for initial outpatient cardiology visit. \par \par Recent inferior STEMI with staged PCI/TATY doing well since discharge, vitals stable, adherent with DAPT/medications, off cigarette smoking. \par \par 1. CAD/inferior wall MI with TATY to dRCA and LCx- continue ASA 81mg and clopidogrel 75mg least 1 year, would benefit for long term DAPT use; continue atorvastatin 80mg, LDL goal <70 will repeat fasting lipid on next follow up; continue metoprolol, referral for cardiac rehab. Continue nicotine patch for smoking cessation. \par \par 2. HTN- BP controlled on lisinopril 10mg\par \par 3. H/o COPD- on inhalers, no longer follow up with pulmonary consider reestablish care. \par \par 4. Anxiety- continue low dose Xanax PRN, on fluoxetine/lamotrigine, referral to establish care with new psychiatrist. \par \par \par She requests referral for new PMD as well. \par \par Follow up in 3 months.

## 2021-04-25 NOTE — HISTORY OF PRESENT ILLNESS
[FreeTextEntry1] : 55F h/o chronic active smoker, COPD, lung nodule anxiety, that presented to [facility]-ED on 1/3/21 with 1 day history of midsternal chest pain associated with nausea and vomiting, CODE STEMI activated for acute inferior wall MI, initial Trop 1.04 and found with dRCA 100% s/p TATY x1, had staged PCI/DESx1 to 80% LCx on 1/6/21, LV EF ~45%, presented for initial outpatient cardiology visit see on 1/2021 notable anxiety symptoms given short course of Xanax as needed advisd to follow up with pyschiatrist, interval history with fall on ice 2.2021 with R-wrist fracture required splint/brace . \par \par She denies any chest pain or discomfort, ambulating well, also is coping well with the recent passing of her elderly mother, planning to spend sometime with her father, reports anxiety been lessened with use of Xanax, not been seeing a psychiatrist for few years, reports no much effect from fluoxetine/lamotrigine use. Denies any bleeding with DAPT use. \par \par \par A1c 4.8%\par , , HDL 59, \par pBNP 1140

## 2021-04-26 ENCOUNTER — APPOINTMENT (OUTPATIENT)
Dept: CARDIOLOGY | Facility: CLINIC | Age: 56
End: 2021-04-26
Payer: MEDICARE

## 2021-05-07 ENCOUNTER — APPOINTMENT (OUTPATIENT)
Dept: CARDIOLOGY | Facility: CLINIC | Age: 56
End: 2021-05-07
Payer: MEDICARE

## 2021-05-07 ENCOUNTER — NON-APPOINTMENT (OUTPATIENT)
Age: 56
End: 2021-05-07

## 2021-05-07 VITALS
WEIGHT: 130 LBS | HEART RATE: 81 BPM | DIASTOLIC BLOOD PRESSURE: 70 MMHG | HEIGHT: 66 IN | TEMPERATURE: 98.1 F | BODY MASS INDEX: 20.89 KG/M2 | SYSTOLIC BLOOD PRESSURE: 122 MMHG | OXYGEN SATURATION: 97 %

## 2021-05-07 PROCEDURE — 99214 OFFICE O/P EST MOD 30 MIN: CPT

## 2021-05-07 PROCEDURE — 99072 ADDL SUPL MATRL&STAF TM PHE: CPT

## 2021-05-07 PROCEDURE — 93000 ELECTROCARDIOGRAM COMPLETE: CPT

## 2021-05-07 RX ORDER — NICOTINE 21-14-7MG
21-14-7 KIT TRANSDERMAL
Qty: 1 | Refills: 0 | Status: DISCONTINUED | COMMUNITY
Start: 2021-01-08 | End: 2021-05-07

## 2021-05-07 NOTE — PHYSICAL EXAM
[Normal] : moves all extremities, no focal deficits, normal speech [Alert and Oriented] : alert and oriented [Normal memory] : normal memory [de-identified] : anxious and tearful

## 2021-05-07 NOTE — REVIEW OF SYSTEMS
[Joint Pain] : joint pain [Depression] : depression [Anxiety] : anxiety [Under Stress] : under stress [Negative] : Heme/Lymph [Joint Swelling] : no joint swelling [Suicidal] : not suicidal

## 2021-05-07 NOTE — PHYSICAL EXAM
[Normal] : moves all extremities, no focal deficits, normal speech [Alert and Oriented] : alert and oriented [Normal memory] : normal memory [de-identified] : anxious and tearful

## 2021-05-10 NOTE — DISCUSSION/SUMMARY
[FreeTextEntry1] : 55F h/o chronic active smoker, COPD, lung nodule anxiety, that presented to [facility]-ED on 1/3/21 with 1 day history of midsternal chest pain associated with nausea and vomiting, CODE STEMI activated for acute inferior wall MI, initial Trop 1.04 and found with dRCA 100% s/p TATY x1, had staged PCI/DESx1 to 80% LCx on 1/6/21, LV EF ~45%, presented for initial outpatient cardiology visit seen on 1/2021 notable anxiety symptoms given short course of Xanax as needed advised to follow up with psychiatrist, interval history had a fall on ice on 02/2021 with R-wrist fracture required splint/brace, now presents for follow up. \par \par Recent inferior STEMI with staged PCI/TATY doing well since discharge, vitals stable, adherent with DAPT/medications, off cigarette smoking. \par \par 1. CAD/inferior wall MI with TATY to dRCA and LCx- continue ASA 81mg and clopidogrel 75mg least 1 year, would benefit for long term DAPT use; continue atorvastatin 80mg, LDL goal <70 will repeat fasting lipid on next follow up; continue metoprolol, referral for cardiac rehab. Continue nicotine patch for smoking cessation. \par \par 2. HTN- BP controlled on lisinopril 10mg\par \par 3. H/o COPD- on inhalers, no longer follow up with pulmonary consider reestablish care. \par \par 4. Anxiety- continue low dose Xanax PRN, on fluoxetine/lamotrigine, referral to establish care with new psychiatrist. \par \par \par She requests referral for new PMD as well. \par \par Follow up in 3 months.

## 2021-05-10 NOTE — DISCUSSION/SUMMARY
[FreeTextEntry1] : 55F h/o chronic active smoker, COPD, lung nodule anxiety, that presented to [facility]-ED on 1/3/21 with 1 day history of midsternal chest pain associated with nausea and vomiting, CODE STEMI activated for acute inferior wall MI, initial Trop 1.04 and found with dRCA 100% s/p TATY x1, had staged PCI/DESx1 to 80% LCx on 1/6/21, LV EF ~45%, presented for initial outpatient cardiology visit seen on 1/2021 notable anxiety symptoms given short course of Xanax as needed advised to follow up with psychiatrist, interval history had a fall on ice on 02/2021 with R-wrist fracture required splint/brace, now presents for follow up. \par \par Recent inferior STEMI with staged PCI/TATY doing well since discharge, vitals stable, adherent with DAPT/medications, off cigarette smoking. \par \par 1. CAD/inferior wall MI with TATY to dRCA and LCx- continue ASA 81mg and clopidogrel 75mg least 1 year, would benefit for long term DAPT use; continue atorvastatin 80mg, LDL goal <70 will repeat fasting lipid on next follow up; continue metoprolol, referral for cardiac rehab. Continue nicotine patch for smoking cessation. \par \par 2. HTN- BP controlled on lisinopril 10mg\par \par 3. H/o COPD- on inhalers, no longer follow up with pulmonary consider reestablish care. \par \par 4. Anxiety- continue low dose Xanax PRN, on fluoxetine/lamotrigine, referral to establish care with new psychiatrist. \par \par \par She requests referral for new PMD as well. \par \par Follow up in 3 months.

## 2021-05-10 NOTE — HISTORY OF PRESENT ILLNESS
[FreeTextEntry1] : 55F h/o chronic active smoker, COPD, lung nodule anxiety, that presented to [facility]-ED on 1/3/21 with 1 day history of midsternal chest pain associated with nausea and vomiting, CODE STEMI activated for acute inferior wall MI, initial Trop 1.04 and found with dRCA 100% s/p TATY x1, had staged PCI/DESx1 to 80% LCx on 1/6/21, LV EF ~45%, presented for initial outpatient cardiology visit seen on 1/2021 notable anxiety symptoms given short course of Xanax as needed advised to follow up with psychiatrist, interval history had a fall on ice on 02/2021 with R-wrist fracture required splint/brace, now presents for follow up. \par \par Reports feeling well, adherent with medications, still has not yet find a psychiatrist that takes her insurance, also has no PMD as the one she was referred to does not take her insurance. Still continues to smoke 3 cigarretes per day. Baseline can ambulate up to 2 miles limited by L-flank pain, denies chest pain or shortness of breath. She ran out of all of her medications for 2 weeks and did not go to the pharmacy to get renewal, reports on low income and not able to afford nicotine gums. Using Advair daily and albuterol intermittently. \par \par She completed the J&J vaccine in April without side effect. \par \par \par Prior visit 1/2021: \par She denies any chest pain or discomfort, ambulating well, also is coping well with the recent passing of her elderly mother, planning to spend sometime with her father, reports anxiety been lessened with use of Xanax, not been seeing a psychiatrist for few years, reports no much effect from fluoxetine/lamotrigine use. Denies any bleeding with DAPT use. \par \par \par A1c 4.8%\par , , HDL 59, \par pBNP 1140

## 2021-05-10 NOTE — HISTORY OF PRESENT ILLNESS
[FreeTextEntry1] : 55F h/o chronic active smoker, COPD, lung nodule anxiety, that presented to [facility]ED on 1/3/21 with 1 day history of midsternal chest pain associated with nausea and vomiting, CODE STEMI activated for acute inferior wall MI, initial Trop 1.04 and found with dRCA 100% s/p TATY x1, had staged PCI/DESx1 to 80% LCx on 1/6/21, LV EF ~45%, presented for initial outpatient cardiology visit seen on 1/2021 notable anxiety symptoms given short course of Xanax as needed advised to follow up with psychiatrist, interval history had a fall on ice on 02/2021 with R-wrist fracture required splint/brace, now presents for follow up. \par \par Reports feeling well, adherent with medications, still has not yet find a psychiatrist that takes her insurance, also has no PMD as the one she was referred to does not take her insurance. Still continues to smoke 3 cigarretes per day. Baseline can ambulate up to 2 miles limited by L-flank pain, denies chest pain or shortness of breath. She ran out of all of her medications for 2 weeks and did not go to the pharmacy to get renewal, reports on low income and not able to afford nicotine gums. Using Advair daily and albuterol intermittently. \par \par She completed the J&J vaccine in April without side effect. \par \par \par Prior visit 1/2021: \par She denies any chest pain or discomfort, ambulating well, also is coping well with the recent passing of her elderly mother, planning to spend sometime with her father, reports anxiety been lessened with use of Xanax, not been seeing a psychiatrist for few years, reports no much effect from fluoxetine/lamotrigine use. Denies any bleeding with DAPT use. \par \par \par A1c 4.8%\par , , HDL 59, \par pBNP 1140

## 2021-06-14 ENCOUNTER — EMERGENCY (EMERGENCY)
Facility: HOSPITAL | Age: 56
LOS: 1 days | Discharge: HOME | End: 2021-06-14
Attending: EMERGENCY MEDICINE
Payer: COMMERCIAL

## 2021-06-14 VITALS
HEIGHT: 66 IN | RESPIRATION RATE: 20 BRPM | DIASTOLIC BLOOD PRESSURE: 93 MMHG | HEART RATE: 82 BPM | OXYGEN SATURATION: 98 % | SYSTOLIC BLOOD PRESSURE: 128 MMHG | WEIGHT: 130.07 LBS | TEMPERATURE: 99 F

## 2021-06-14 DIAGNOSIS — S42.009A FRACTURE OF UNSPECIFIED PART OF UNSPECIFIED CLAVICLE, INITIAL ENCOUNTER FOR CLOSED FRACTURE: Chronic | ICD-10-CM

## 2021-06-14 PROCEDURE — 99283 EMERGENCY DEPT VISIT LOW MDM: CPT

## 2021-06-14 RX ORDER — OXYCODONE AND ACETAMINOPHEN 5; 325 MG/1; MG/1
1 TABLET ORAL ONCE
Refills: 0 | Status: DISCONTINUED | OUTPATIENT
Start: 2021-06-14 | End: 2021-06-14

## 2021-06-14 RX ORDER — AMOXICILLIN 250 MG/5ML
500 SUSPENSION, RECONSTITUTED, ORAL (ML) ORAL ONCE
Refills: 0 | Status: COMPLETED | OUTPATIENT
Start: 2021-06-14 | End: 2021-06-14

## 2021-06-14 RX ORDER — AMOXICILLIN 250 MG/5ML
1 SUSPENSION, RECONSTITUTED, ORAL (ML) ORAL
Qty: 21 | Refills: 0
Start: 2021-06-14 | End: 2021-06-20

## 2021-06-14 RX ADMIN — Medication 500 MILLIGRAM(S): at 08:51

## 2021-06-14 RX ADMIN — OXYCODONE AND ACETAMINOPHEN 1 TABLET(S): 5; 325 TABLET ORAL at 08:51

## 2021-06-14 NOTE — ED ADULT TRIAGE NOTE - ARRIVAL FROM
Home I have personally seen and examined this patient.  I have fully participated in the care of this patient. I have reviewed all pertinent clinical information, including history, physical exam, plan and the Resident’s note and agree except as noted.

## 2021-06-14 NOTE — ED PROVIDER NOTE - CLINICAL SUMMARY MEDICAL DECISION MAKING FREE TEXT BOX
56yo F presenting with dental pain x 3 days. Very poor dentition and decay on exam. Pt does not have dentist. Will tx pain with percocet, given amoxicillin and give f/u info for [place] [facility]

## 2021-06-14 NOTE — ED PROVIDER NOTE - NS ED ROS FT
Gen: denies fever, chills, fatigue, weight loss  Skin: denies rashes, laceration, bruising  HEENT: +Dental pain. denies visual changes, ear pain, nasal congestion, throat pain  Respiratory: denies DUNBAR, SOB, cough, wheezing  Cardiovascular: denies chest pain, palpitations, diaphoresis, LE edema  GI: denies abdominal pain, n/v/d  : denies dysuria, frequency, urgency, bowel/bladder incontinence  MSK: denies joint swelling/pain, back pain, neck pain  Neuro: denies headache, dizziness, weakness, numbness  Psych: denies anxiety, depression, SI/HI, visual/auditory hallucinations

## 2021-06-14 NOTE — ED PROVIDER NOTE - PATIENT PORTAL LINK FT
You can access the FollowMyHealth Patient Portal offered by [facility] by registering at the following website: [email or web address]. By joining [facility]’s FollowMyHealth portal, you will also be able to view your health information using other applications (apps) compatible with our system.

## 2021-06-14 NOTE — ED PROVIDER NOTE - PHYSICAL EXAMINATION
Gen: No acute distress, non toxic  HEENT: EOMI, PERRL. Mucous membranes moist, pink conjunctivae. Poor dentition, tooth decay to R #32 tooth, several teeth missing. No gingival erythema or fluctuance. uvula midline.  CV: RRR, nl s1/s2.  Resp: CTAB, normal rate and effort  Neuro: A&O x 3, moving all 4 extremities  MSK: No spine or joint tenderness to palpation  Skin: No rashes. intact and perfused.

## 2021-06-14 NOTE — ED PROVIDER NOTE - ATTENDING CONTRIBUTION TO CARE
Dr. Baum : I have personally seen and examined this patient at the bedside. I have fully participated in the care of this patient. I have reviewed all pertinent clinical information, including history, physical exam, plan and the PA's note and agree except as noted.     56yo F pmhx COPD, CAD s/p MI 6 months ago with 2 stents pW dental pain x few days. notes mild swelling to cheek. . No improvement with tylenol. Does not have a dentist to follow-up with.   Denies f/c/n/v/cp/sob/palpitations/cough/abd.pain/d/c/dysuria/hematuria. no throat pain no difficulty swallowing. no sick contacts/recent travel.    PE:  Head: atraumatic, normacephalic  throat: uvula midline no exudates; tooth #32 decay, broken, no ttp or swelling /redness to gum + mild ttp and swelling to lower jaw area  heart: rrr s1s2  lungs: ctab  abd: soft, nt nd +bs no rebound/guarding no cva ttp  skin: warm    -->dental pain /swelling- tooth decay will need abx; pain control--dc with [facility] dental follow up

## 2021-06-14 NOTE — ED PROVIDER NOTE - OBJECTIVE STATEMENT
54yo F pmhx COPD, CAD s/p MI 6 months ago with 2 stents presents to ED c/o dental pain. States she has been having right lower dental pain for past few days, became worse yesterday. Pain noted to right lower molar. No improvement with tylenol. Does not have a dentist to follow-up with. Denies fever, throat pain, difficulty swallowing, sob, cp.

## 2021-08-08 ENCOUNTER — RX RENEWAL (OUTPATIENT)
Age: 56
End: 2021-08-08

## 2021-08-09 ENCOUNTER — RX RENEWAL (OUTPATIENT)
Age: 56
End: 2021-08-09

## 2021-09-20 ENCOUNTER — APPOINTMENT (OUTPATIENT)
Dept: FAMILY MEDICINE | Facility: CLINIC | Age: 56
End: 2021-09-20
Payer: MEDICARE

## 2021-09-20 VITALS
BODY MASS INDEX: 21.86 KG/M2 | HEART RATE: 76 BPM | TEMPERATURE: 97.6 F | DIASTOLIC BLOOD PRESSURE: 72 MMHG | WEIGHT: 136 LBS | HEIGHT: 66 IN | OXYGEN SATURATION: 97 % | SYSTOLIC BLOOD PRESSURE: 156 MMHG

## 2021-09-20 DIAGNOSIS — F19.90 OTHER PSYCHOACTIVE SUBSTANCE USE, UNSPECIFIED, UNCOMPLICATED: ICD-10-CM

## 2021-09-20 DIAGNOSIS — Z00.00 ENCOUNTER FOR GENERAL ADULT MEDICAL EXAMINATION W/OUT ABNORMAL FINDINGS: ICD-10-CM

## 2021-09-20 DIAGNOSIS — Z82.49 FAMILY HISTORY OF ISCHEMIC HEART DISEASE AND OTHER DISEASES OF THE CIRCULATORY SYSTEM: ICD-10-CM

## 2021-09-20 DIAGNOSIS — Z78.9 OTHER SPECIFIED HEALTH STATUS: ICD-10-CM

## 2021-09-20 DIAGNOSIS — Z72.0 TOBACCO USE: ICD-10-CM

## 2021-09-20 DIAGNOSIS — Z63.5 DISRUPTION OF FAMILY BY SEPARATION AND DIVORCE: ICD-10-CM

## 2021-09-20 PROCEDURE — 99386 PREV VISIT NEW AGE 40-64: CPT | Mod: 25

## 2021-09-20 PROCEDURE — 99406 BEHAV CHNG SMOKING 3-10 MIN: CPT

## 2021-09-20 PROCEDURE — G0443: CPT | Mod: 59

## 2021-09-20 SDOH — SOCIAL STABILITY - SOCIAL INSECURITY: DISRUPTION OF FAMILY BY SEPARATION AND DIVORCE: Z63.5

## 2021-09-21 PROBLEM — Z00.00 PHYSICAL EXAM: Status: ACTIVE | Noted: 2021-09-21

## 2021-09-21 NOTE — COUNSELING
[Behavioral health counseling provided] : Behavioral health counseling provided [Sleep ___ hours/day] : Sleep [unfilled] hours/day [Engage in a relaxing activity] : Engage in a relaxing activity [Plan in advance] : Plan in advance [Risk of tobacco use and health benefits of smoking cessation discussed] : Risk of tobacco use and health benefits of smoking cessation discussed [Cessation strategies including cessation program discussed] : Cessation strategies including cessation program discussed [Use of nicotine replacement therapies and other medications discussed] : Use of nicotine replacement therapies and other medications discussed [Encouraged to pick a quit date and identify support needed to quit] : Encouraged to pick a quit date and identify support needed to quit [Willing to Quit Smoking] : Willing to quit smoking [Tobacco Use Cessation Intermediate Greater Than 3 Minutes Up to 10 Minutes] : Tobacco Use Cessation Intermediate Greater Than 3 Minutes Up to 10 Minutes [Hazards of at-risk alcohol use discussed] : Hazards of at-risk alcohol use discussed [Strategies to reduce or eliminate alcohol use discussed] : Strategies to reduce or eliminate alcohol use discussed [Support options provided] : Support options provided [Participate in Treatment Program] : Participate in treatment program [Patient motivation] : Patient motivation [Needs reinforcement, provided] : Patient needs reinforcement on understanding of lifestyle changes and steps needed to achieve self management goal; reinforcement was provided [FreeTextEntry1] : 15

## 2021-09-21 NOTE — REVIEW OF SYSTEMS
[Back Pain] : back pain [Anxiety] : anxiety [Depression] : depression [Fever] : no fever [Chills] : no chills [Fatigue] : no fatigue [Hot Flashes] : no hot flashes [Night Sweats] : no night sweats [Recent Change In Weight] : ~T no recent weight change [Discharge] : no discharge [Pain] : no pain [Redness] : no redness [Dryness] : no dryness  [Vision Problems] : no vision problems [Itching] : no itching [Earache] : no earache [Hearing Loss] : no hearing loss [Nosebleed] : no nosebleeds [Hoarseness] : no hoarseness [Nasal Discharge] : no nasal discharge [Sore Throat] : no sore throat [Postnasal Drip] : no postnasal drip [Chest Pain] : no chest pain [Palpitations] : no palpitations [Leg Claudication] : no leg claudication [Lower Ext Edema] : no lower extremity edema [Orthopnea] : no orthopnea [Shortness Of Breath] : no shortness of breath [Wheezing] : no wheezing [Cough] : no cough [Dyspnea on Exertion] : no dyspnea on exertion [Abdominal Pain] : no abdominal pain [Nausea] : no nausea [Constipation] : no constipation [Diarrhea] : diarrhea [Vomiting] : no vomiting [Heartburn] : no heartburn [Melena] : no melena [Dysuria] : no dysuria [Incontinence] : no incontinence [Nocturia] : no nocturia [Poor Libido] : libido not poor [Hematuria] : no hematuria [Frequency] : no frequency [Vaginal Discharge] : no vaginal discharge [Dysmenorrhea] : no dysmenorrhea [Joint Pain] : no joint pain [Joint Stiffness] : no joint stiffness [Joint Swelling] : no joint swelling [Muscle Weakness] : no muscle weakness [Muscle Pain] : no muscle pain [Itching] : no itching [Mole Changes] : no mole changes [Nail Changes] : no nail changes [Hair Changes] : no hair changes [Skin Rash] : no skin rash [Headache] : no headache [Dizziness] : no dizziness [Fainting] : no fainting [Confusion] : no confusion [Memory Loss] : no memory loss [Unsteady Walking] : no ataxia [Suicidal] : not suicidal [Insomnia] : no insomnia [Easy Bleeding] : no easy bleeding [Easy Bruising] : no easy bruising [Swollen Glands] : no swollen glands

## 2021-09-21 NOTE — HEALTH RISK ASSESSMENT
[Fair] :  ~his/her~ mood as fair [] : Yes [20 or more] : 20 or more [2 - 3 times a week (3 pts)] : 2 - 3  times a week (3 points) [1 or 2 (0 pts)] : 1 or 2 (0 points) [Never (0 pts)] : Never (0 points) [Yes] : In the past 12 months have you used drugs other than those required for medical reasons? Yes [No falls in past year] : Patient reported no falls in the past year [0] : 1) Little interest or pleasure doing things: Not at all (0) [1] : 2) Feeling down, depressed, or hopeless for several days (1) [Patient declined colonoscopy] : Patient declined colonoscopy [HIV test declined] : HIV test declined [Hepatitis C test declined] : Hepatitis C test declined [None] : None [Alone] : lives alone [Unemployed] : unemployed [On disability] : on disability [High School] : high school [] :  [# Of Children ___] : has [unfilled] children [Feels Safe at Home] : Feels safe at home [Reports normal functional visual acuity (ie: able to read med bottle)] : Reports normal functional visual acuity [Smoke Detector] : smoke detector [Carbon Monoxide Detector] : carbon monoxide detector [Safety elements used in home] : safety elements used in home [Seat Belt] :  uses seat belt [Sunscreen] : uses sunscreen [With Patient/Caregiver] : , with patient/caregiver [de-identified] : pot, cocaine [de-identified] : walking almost everyday and sit ups [de-identified] : no [Change in mental status noted] : No change in mental status noted [Language] : denies difficulty with language [Behavior] : denies difficulty with behavior [Learning/Retaining New Information] : denies difficulty learning/retaining new information [Handling Complex Tasks] : denies difficulty handling complex tasks [Reasoning] : denies difficulty with reasoning [Spatial Ability and Orientation] : denies difficulty with spatial ability and orientation [Sexually Active] : not sexually active [High Risk Behavior] : no high risk behavior [Reports changes in hearing] : Reports no changes in hearing [Reports changes in vision] : Reports no changes in vision [Reports changes in dental health] : Reports no changes in dental health [Guns at Home] : no guns at home [Travel to Developing Areas] : does not  travel to developing areas [TB Exposure] : is not being exposed to tuberculosis [Caregiver Concerns] : does not have caregiver concerns [MammogramComments] : Encouraged to make an appointment [PapSmearComments] : Encouraged to make an appointment with GYN [AdvancecareDate] : 9/20/21

## 2021-09-21 NOTE — PLAN
[FreeTextEntry1] : Physical exam:  Done\par \par Asthma:  Continue to use medication, Albuterol inhaler 2 puffs every 4-6 hours prn, Avoid mold, dust, pollen.\par \par Smoker:  Smoking cessation counseling given.  Discussed the risk of smoking and the benefits of smoking cessation.  Encouraged to go to smoking cessation classes, Information given for classes.\par \par Alcohol use:  Counseling given, Discussed the risk of alcohol used, and the health benefits of alcohol cessation.  Encouraged to go to alcohol cessation programs,  Information given\par \par HM:  Discussed Life style modification, healthy diet, low salts, fats, sweets, less juices/sodas, butter, cheese, fried food.  More water, fruits vegetables.  Eat smaller portions 3-4 times per day, keep food diary, weight self weekly, move around more.  Exercise, walking 30-60 minutes per day.  Encouraged to sleep 8-9 hours per night, get plenty of rest.  Discussed dental hygiene, floss, brush after each meals, dental check every 6 months.  Annual eye check.  Encouraged frequent hands washing, wear mask, keep social distance.  Continue to f/u with specialists.  Discussed the importance of taking medications as ordered.  Lab requisition:  CBC with diff, cmp, lipids, tsh, hgbA1c.\par \par Depression/Anxiety:  Continue with psych medications, continue to f/u with psych/therapist.\par \par Follow up in 12-14 weeks or prn.\par \par \par \par \par \par

## 2021-09-21 NOTE — HISTORY OF PRESENT ILLNESS
[FreeTextEntry1] : CPE [de-identified] : 56 y.o female with PMHx of hypertension, COPD, stent placement, broken left colar bone, depression, anxiety.  Here today for CPE, initial visit, to be linked to [employer] programs.  Reported feeling fine, no complaints at this time.  Following up with specialist, cardiologist, ortho, did not see pulmo for few years, but will make an appointment. Denies cp, palpitation, sob, dizziness, edema, n/v, edema.

## 2021-11-08 ENCOUNTER — APPOINTMENT (OUTPATIENT)
Dept: PULMONOLOGY | Facility: CLINIC | Age: 56
End: 2021-11-08
Payer: MEDICARE

## 2021-11-08 ENCOUNTER — NON-APPOINTMENT (OUTPATIENT)
Age: 56
End: 2021-11-08

## 2021-11-08 VITALS
OXYGEN SATURATION: 96 % | DIASTOLIC BLOOD PRESSURE: 84 MMHG | RESPIRATION RATE: 16 BRPM | BODY MASS INDEX: 25.34 KG/M2 | HEIGHT: 63 IN | HEART RATE: 76 BPM | WEIGHT: 143 LBS | SYSTOLIC BLOOD PRESSURE: 140 MMHG

## 2021-11-08 DIAGNOSIS — Z95.5 PRESENCE OF CORONARY ANGIOPLASTY IMPLANT AND GRAFT: ICD-10-CM

## 2021-11-08 DIAGNOSIS — J45.909 UNSPECIFIED ASTHMA, UNCOMPLICATED: ICD-10-CM

## 2021-11-08 DIAGNOSIS — I21.19 ST ELEVATION (STEMI) MYOCARDIAL INFARCTION INVOLVING OTHER CORONARY ARTERY OF INFERIOR WALL: ICD-10-CM

## 2021-11-08 DIAGNOSIS — F17.200 NICOTINE DEPENDENCE, UNSPECIFIED, UNCOMPLICATED: ICD-10-CM

## 2021-11-08 PROCEDURE — 99204 OFFICE O/P NEW MOD 45 MIN: CPT | Mod: 25

## 2021-11-08 PROCEDURE — G0296 VISIT TO DETERM LDCT ELIG: CPT

## 2021-11-08 PROCEDURE — 99406 BEHAV CHNG SMOKING 3-10 MIN: CPT

## 2021-11-08 RX ORDER — AMOXICILLIN 500 MG/1
500 CAPSULE ORAL
Qty: 21 | Refills: 0 | Status: DISCONTINUED | COMMUNITY
Start: 2021-06-14 | End: 2021-11-08

## 2021-11-08 RX ORDER — AMOXICILLIN AND CLAVULANATE POTASSIUM 875; 125 MG/1; MG/1
875-125 TABLET, COATED ORAL
Qty: 20 | Refills: 0 | Status: DISCONTINUED | COMMUNITY
Start: 2021-06-15 | End: 2021-11-08

## 2021-11-11 NOTE — ED ADULT NURSE NOTE - BREATH SOUNDS, MLM
Clear Levator Labii Superioris Units: 0 Expiration Date (Month Year): 02/2024 Glabellar Complex Units: 25 Additional Area 5 Location: lateral eyebrow (LEB) Show Additional Area 5: Yes Additional Area 4 Location: Platysma (neck) Additional Area 3 Location: DEBBI (gummy smile) consent obtained. Risks include but not limited to lid/brow ptosis, bruising, swelling, diplopia, temporary effect, incomplete chemical denervation. Post-Care Instructions: Patient instructed to not lie down for 4 hours and limit physical activity for 24 hours. Patient instructed not to travel by airplane for 48 hours. Detail Level: Detailed Additional Area 1 Location: masseter Additional Area 2 Location: chin Lot #: [id number] Dilution (U/0.1 Cc): 10

## 2021-11-15 ENCOUNTER — NON-APPOINTMENT (OUTPATIENT)
Age: 56
End: 2021-11-15

## 2021-11-15 VITALS — BODY MASS INDEX: 25.34 KG/M2 | HEIGHT: 63 IN | WEIGHT: 143 LBS

## 2021-11-15 DIAGNOSIS — F17.210 NICOTINE DEPENDENCE, CIGARETTES, UNCOMPLICATED: ICD-10-CM

## 2021-11-15 DIAGNOSIS — Z87.891 PERSONAL HISTORY OF NICOTINE DEPENDENCE: ICD-10-CM

## 2021-11-15 NOTE — HISTORY OF PRESENT ILLNESS
[TextBox_13] : Referred by Dr. Ovidio Son.\par \par Ms. ARDON is a 56 year old female with a history of CAD s/p stents, HTN, high cholesterol, COPD and asthma.\par \par She was called to review eligibility for Low-Dose CT lung cancer screening.  Reviewed and confirmed that the patient meets screening eligibility criteria:\par \par 56 years old \par \par Smoking Status: Current Smoker\par \par Number of pack(s) per day: 1 ppd x 10 years, 1/2 ppd x 30 years\par Number of years smoked: 40\par Number of pack years smokin\par \par Ms. ARDON denies any symptoms of lung cancer, including new cough, change in cough, hemoptysis, and unintentional weight loss.\par \par Ms. ARDON denies any personal history of lung cancer.  No lung cancer in a first degree relative.  Denies any history of occupational exposures.

## 2021-11-17 ENCOUNTER — APPOINTMENT (OUTPATIENT)
Dept: CT IMAGING | Facility: CLINIC | Age: 56
End: 2021-11-17
Payer: MEDICARE

## 2021-11-17 ENCOUNTER — OUTPATIENT (OUTPATIENT)
Dept: OUTPATIENT SERVICES | Facility: HOSPITAL | Age: 56
LOS: 1 days | End: 2021-11-17
Payer: COMMERCIAL

## 2021-11-17 DIAGNOSIS — S42.009A FRACTURE OF UNSPECIFIED PART OF UNSPECIFIED CLAVICLE, INITIAL ENCOUNTER FOR CLOSED FRACTURE: Chronic | ICD-10-CM

## 2021-11-17 DIAGNOSIS — Z87.891 PERSONAL HISTORY OF NICOTINE DEPENDENCE: ICD-10-CM

## 2021-11-17 PROCEDURE — 71271 CT THORAX LUNG CANCER SCR C-: CPT | Mod: 26

## 2021-11-17 PROCEDURE — 71271 CT THORAX LUNG CANCER SCR C-: CPT

## 2021-11-20 ENCOUNTER — APPOINTMENT (OUTPATIENT)
Dept: DISASTER EMERGENCY | Facility: CLINIC | Age: 56
End: 2021-11-20

## 2021-11-22 LAB — SARS-COV-2 N GENE NPH QL NAA+PROBE: NOT DETECTED

## 2021-11-24 ENCOUNTER — APPOINTMENT (OUTPATIENT)
Dept: PULMONOLOGY | Facility: CLINIC | Age: 56
End: 2021-11-24
Payer: MEDICARE

## 2021-11-24 VITALS — SYSTOLIC BLOOD PRESSURE: 126 MMHG | HEART RATE: 77 BPM | OXYGEN SATURATION: 93 % | DIASTOLIC BLOOD PRESSURE: 66 MMHG

## 2021-11-24 DIAGNOSIS — R05.9 COUGH, UNSPECIFIED: ICD-10-CM

## 2021-11-24 PROCEDURE — 99214 OFFICE O/P EST MOD 30 MIN: CPT | Mod: 25

## 2021-11-24 PROCEDURE — 94010 BREATHING CAPACITY TEST: CPT

## 2022-01-25 ENCOUNTER — OUTPATIENT (OUTPATIENT)
Dept: OUTPATIENT SERVICES | Facility: HOSPITAL | Age: 57
LOS: 1 days | End: 2022-01-25
Payer: COMMERCIAL

## 2022-01-25 ENCOUNTER — APPOINTMENT (OUTPATIENT)
Age: 57
End: 2022-01-25
Payer: MEDICARE

## 2022-01-25 DIAGNOSIS — G47.33 OBSTRUCTIVE SLEEP APNEA (ADULT) (PEDIATRIC): ICD-10-CM

## 2022-01-25 DIAGNOSIS — S42.009A FRACTURE OF UNSPECIFIED PART OF UNSPECIFIED CLAVICLE, INITIAL ENCOUNTER FOR CLOSED FRACTURE: Chronic | ICD-10-CM

## 2022-01-25 PROCEDURE — 95810 POLYSOM 6/> YRS 4/> PARAM: CPT

## 2022-01-25 PROCEDURE — 95810 POLYSOM 6/> YRS 4/> PARAM: CPT | Mod: 26

## 2022-03-03 ENCOUNTER — APPOINTMENT (OUTPATIENT)
Dept: PULMONOLOGY | Facility: CLINIC | Age: 57
End: 2022-03-03
Payer: MEDICARE

## 2022-03-03 VITALS
DIASTOLIC BLOOD PRESSURE: 86 MMHG | SYSTOLIC BLOOD PRESSURE: 134 MMHG | HEIGHT: 64 IN | BODY MASS INDEX: 25.27 KG/M2 | WEIGHT: 148 LBS | RESPIRATION RATE: 16 BRPM | OXYGEN SATURATION: 96 % | HEART RATE: 70 BPM

## 2022-03-03 DIAGNOSIS — R91.8 OTHER NONSPECIFIC ABNORMAL FINDING OF LUNG FIELD: ICD-10-CM

## 2022-03-03 DIAGNOSIS — Z87.891 PERSONAL HISTORY OF NICOTINE DEPENDENCE: ICD-10-CM

## 2022-03-03 DIAGNOSIS — R09.82 POSTNASAL DRIP: ICD-10-CM

## 2022-03-03 DIAGNOSIS — J44.9 CHRONIC OBSTRUCTIVE PULMONARY DISEASE, UNSPECIFIED: ICD-10-CM

## 2022-03-03 PROCEDURE — 99406 BEHAV CHNG SMOKING 3-10 MIN: CPT

## 2022-03-03 PROCEDURE — 99214 OFFICE O/P EST MOD 30 MIN: CPT | Mod: 25

## 2022-03-08 ENCOUNTER — APPOINTMENT (OUTPATIENT)
Dept: CARDIOLOGY | Facility: CLINIC | Age: 57
End: 2022-03-08
Payer: MEDICARE

## 2022-03-08 ENCOUNTER — NON-APPOINTMENT (OUTPATIENT)
Age: 57
End: 2022-03-08

## 2022-03-08 VITALS
DIASTOLIC BLOOD PRESSURE: 90 MMHG | WEIGHT: 141 LBS | TEMPERATURE: 99.2 F | BODY MASS INDEX: 24.07 KG/M2 | HEART RATE: 69 BPM | OXYGEN SATURATION: 95 % | HEIGHT: 64 IN | SYSTOLIC BLOOD PRESSURE: 150 MMHG

## 2022-03-08 DIAGNOSIS — E78.5 HYPERLIPIDEMIA, UNSPECIFIED: ICD-10-CM

## 2022-03-08 PROCEDURE — 93000 ELECTROCARDIOGRAM COMPLETE: CPT

## 2022-03-08 PROCEDURE — 99214 OFFICE O/P EST MOD 30 MIN: CPT

## 2022-04-04 ENCOUNTER — RX RENEWAL (OUTPATIENT)
Age: 57
End: 2022-04-04

## 2022-05-19 ENCOUNTER — APPOINTMENT (OUTPATIENT)
Dept: FAMILY MEDICINE | Facility: CLINIC | Age: 57
End: 2022-05-19
Payer: MEDICARE

## 2022-05-19 ENCOUNTER — LABORATORY RESULT (OUTPATIENT)
Age: 57
End: 2022-05-19

## 2022-05-19 VITALS
WEIGHT: 146 LBS | HEART RATE: 73 BPM | HEIGHT: 64 IN | RESPIRATION RATE: 16 BRPM | TEMPERATURE: 98 F | BODY MASS INDEX: 24.92 KG/M2 | DIASTOLIC BLOOD PRESSURE: 80 MMHG | OXYGEN SATURATION: 98 % | SYSTOLIC BLOOD PRESSURE: 128 MMHG

## 2022-05-19 PROCEDURE — 99386 PREV VISIT NEW AGE 40-64: CPT | Mod: 25

## 2022-05-19 NOTE — ASSESSMENT
[FreeTextEntry1] : Establishing PCP:\par \par HCM:\par -Blood and UA today\par -HIV/HC screening\par \par mammo/ Gyn/ Colonoscopy: refused\par Lung cancer screening: Refused\par \par COPD:\par -F/up with Pulmonology.\par -On Advair\par -On Albuterol prn\par \par CAD/ HTN:\par -F/up with cardiology\par -On Atorvastatin.\par -On Aspirin\par -On Plavix\par -On Lisinopril, Metoprolol\par \par Depression/ Anxiety:\par -F/up with Psychiatry, Dr Echavarria.\par -On Fluoxetine and Aripiprazole.\par -Hydroxyzine prn.

## 2022-05-19 NOTE — HISTORY OF PRESENT ILLNESS
[FreeTextEntry1] : Establish PCP [de-identified] : 55 y/o F relocated to the area recently, here to establish PCP.\par medical hx significant for HTN, COPD, current smoker, CAD s/p Stent placed, depression, Anxiety.\par Seen by psychiatry, Dr Echavarria.\par Pulmonology: Dr Son\par Cardiology: Dr FISHER.\par She is on disability for Mental disorder. Single, G0

## 2022-05-19 NOTE — HEALTH RISK ASSESSMENT
[Good] : ~his/her~  mood as  good [Current] : Current [20 or more] : 20 or more [Yes] : Yes [No] : In the past 12 months have you used drugs other than those required for medical reasons? No [No falls in past year] : Patient reported no falls in the past year [0] : 2) Feeling down, depressed, or hopeless: Not at all (0) [Patient declined mammogram] : Patient declined mammogram [Patient declined PAP Smear] : Patient declined PAP Smear [Patient declined bone density test] : Patient declined bone density test [Patient declined colonoscopy] : Patient declined colonoscopy [HIV Test offered] : HIV Test offered [Hepatitis C test offered] : Hepatitis C test offered [None] : None [Alone] : lives alone [On disability] : on disability [Single] : single [# Of Children ___] : has [unfilled] children [Feels Safe at Home] : Feels safe at home [Fully functional (bathing, dressing, toileting, transferring, walking, feeding)] : Fully functional (bathing, dressing, toileting, transferring, walking, feeding) [Fully functional (using the telephone, shopping, preparing meals, housekeeping, doing laundry, using] : Fully functional and needs no help or supervision to perform IADLs (using the telephone, shopping, preparing meals, housekeeping, doing laundry, using transportation, managing medications and managing finances) [Smoke Detector] : smoke detector [Safety elements used in home] : safety elements used in home [Seat Belt] :  uses seat belt [Designated Healthcare Proxy] : Designated healthcare proxy [Name: ___] : Health Care Proxy's Name: [unfilled]  [Relationship: ___] : Relationship: [unfilled] [de-identified] : 5 cig/day [de-identified] : wine ocassional [de-identified] : Eliptical 3/week and walks daily [de-identified] : no [[id number]] : 0 [Sexually Active] : not sexually active [Reports changes in hearing] : Reports no changes in hearing [Reports changes in vision] : Reports no changes in vision [Reports changes in dental health] : Reports no changes in dental health [TB Exposure] : is not being exposed to tuberculosis [de-identified] : since 2012 for Mental disability [AdvancecareDate] : 05/22

## 2022-05-20 LAB
25(OH)D3 SERPL-MCNC: 31 NG/ML
ALBUMIN SERPL ELPH-MCNC: 4.5 G/DL
ALP BLD-CCNC: 120 U/L
ALT SERPL-CCNC: 24 U/L
ANION GAP SERPL CALC-SCNC: 12 MMOL/L
APPEARANCE: CLEAR
AST SERPL-CCNC: 19 U/L
BASOPHILS # BLD AUTO: 0.11 K/UL
BASOPHILS NFR BLD AUTO: 1.1 %
BILIRUB SERPL-MCNC: 0.2 MG/DL
BILIRUBIN URINE: NEGATIVE
BLOOD URINE: NEGATIVE
BUN SERPL-MCNC: 13 MG/DL
CALCIUM SERPL-MCNC: 9.7 MG/DL
CHLORIDE SERPL-SCNC: 104 MMOL/L
CHOLEST SERPL-MCNC: 188 MG/DL
CO2 SERPL-SCNC: 26 MMOL/L
COLOR: NORMAL
CREAT SERPL-MCNC: 0.64 MG/DL
EGFR: 103 ML/MIN/1.73M2
EOSINOPHIL # BLD AUTO: 1.05 K/UL
EOSINOPHIL NFR BLD AUTO: 10.9 %
ESTIMATED AVERAGE GLUCOSE: 117 MG/DL
GLUCOSE QUALITATIVE U: NEGATIVE
GLUCOSE SERPL-MCNC: 77 MG/DL
HBA1C MFR BLD HPLC: 5.7 %
HCT VFR BLD CALC: 38.8 %
HCV AB SER QL: NONREACTIVE
HCV S/CO RATIO: 0.11 S/CO
HDLC SERPL-MCNC: 49 MG/DL
HGB BLD-MCNC: 12.2 G/DL
HIV1+2 AB SPEC QL IA.RAPID: NONREACTIVE
IMM GRANULOCYTES NFR BLD AUTO: 0.3 %
KETONES URINE: NEGATIVE
LDLC SERPL CALC-MCNC: 95 MG/DL
LEUKOCYTE ESTERASE URINE: ABNORMAL
LYMPHOCYTES # BLD AUTO: 1.97 K/UL
LYMPHOCYTES NFR BLD AUTO: 20.4 %
MAN DIFF?: NORMAL
MCHC RBC-ENTMCNC: 30.4 PG
MCHC RBC-ENTMCNC: 31.4 GM/DL
MCV RBC AUTO: 96.8 FL
MONOCYTES # BLD AUTO: 0.59 K/UL
MONOCYTES NFR BLD AUTO: 6.1 %
NEUTROPHILS # BLD AUTO: 5.9 K/UL
NEUTROPHILS NFR BLD AUTO: 61.2 %
NITRITE URINE: POSITIVE
NONHDLC SERPL-MCNC: 139 MG/DL
PH URINE: 7
PLATELET # BLD AUTO: 352 K/UL
POTASSIUM SERPL-SCNC: 4 MMOL/L
PROT SERPL-MCNC: 7 G/DL
PROTEIN URINE: NEGATIVE
RBC # BLD: 4.01 M/UL
RBC # FLD: 13.4 %
SODIUM SERPL-SCNC: 141 MMOL/L
SPECIFIC GRAVITY URINE: 1.01
TRIGL SERPL-MCNC: 222 MG/DL
TSH SERPL-ACNC: 3.25 UIU/ML
UROBILINOGEN URINE: NORMAL
WBC # FLD AUTO: 9.65 K/UL

## 2022-05-31 ENCOUNTER — RX RENEWAL (OUTPATIENT)
Age: 57
End: 2022-05-31

## 2022-09-21 NOTE — ED ADULT TRIAGE NOTE - TEMPERATURE IN CELSIUS (DEGREES C)
FAMILY HISTORY:  Father  Still living? Unknown  FH: HTN (hypertension), Age at diagnosis: Age Unknown    Mother  Still living? Yes, Estimated age: Age Unknown  FH: CAD (coronary artery disease), Age at diagnosis: Age Unknown     37.3

## 2022-11-21 ENCOUNTER — NON-APPOINTMENT (OUTPATIENT)
Age: 57
End: 2022-11-21

## 2022-11-23 ENCOUNTER — INPATIENT (INPATIENT)
Facility: HOSPITAL | Age: 57
LOS: 4 days | Discharge: HOME | DRG: 41 | End: 2022-11-28
Attending: GENERAL ACUTE CARE HOSPITAL | Admitting: PSYCHIATRY & NEUROLOGY
Payer: COMMERCIAL

## 2022-11-23 VITALS
HEART RATE: 62 BPM | DIASTOLIC BLOOD PRESSURE: 80 MMHG | SYSTOLIC BLOOD PRESSURE: 140 MMHG | RESPIRATION RATE: 20 BRPM | WEIGHT: 128.53 LBS | OXYGEN SATURATION: 94 % | TEMPERATURE: 97 F | HEIGHT: 63 IN

## 2022-11-23 DIAGNOSIS — S42.009A FRACTURE OF UNSPECIFIED PART OF UNSPECIFIED CLAVICLE, INITIAL ENCOUNTER FOR CLOSED FRACTURE: Chronic | ICD-10-CM

## 2022-11-23 DIAGNOSIS — I63.9 CEREBRAL INFARCTION, UNSPECIFIED: ICD-10-CM

## 2022-11-23 DIAGNOSIS — Z87.81 PERSONAL HISTORY OF (HEALED) TRAUMATIC FRACTURE: Chronic | ICD-10-CM

## 2022-11-23 LAB
ALBUMIN SERPL ELPH-MCNC: 4.2 G/DL — SIGNIFICANT CHANGE UP (ref 3.3–5.2)
ALP SERPL-CCNC: 157 U/L — HIGH (ref 40–120)
ALT FLD-CCNC: 28 U/L — SIGNIFICANT CHANGE UP
ANION GAP SERPL CALC-SCNC: 12 MMOL/L — SIGNIFICANT CHANGE UP (ref 5–17)
APTT BLD: 51.5 SEC — HIGH (ref 27.5–35.5)
AST SERPL-CCNC: 42 U/L — HIGH
BASOPHILS # BLD AUTO: 0.11 K/UL — SIGNIFICANT CHANGE UP (ref 0–0.2)
BASOPHILS NFR BLD AUTO: 1 % — SIGNIFICANT CHANGE UP (ref 0–2)
BILIRUB SERPL-MCNC: 0.4 MG/DL — SIGNIFICANT CHANGE UP (ref 0.4–2)
BUN SERPL-MCNC: 14.9 MG/DL — SIGNIFICANT CHANGE UP (ref 8–20)
CALCIUM SERPL-MCNC: 9.1 MG/DL — SIGNIFICANT CHANGE UP (ref 8.4–10.5)
CHLORIDE SERPL-SCNC: 103 MMOL/L — SIGNIFICANT CHANGE UP (ref 96–108)
CO2 SERPL-SCNC: 25 MMOL/L — SIGNIFICANT CHANGE UP (ref 22–29)
CREAT SERPL-MCNC: 0.65 MG/DL — SIGNIFICANT CHANGE UP (ref 0.5–1.3)
EGFR: 99 ML/MIN/1.73M2 — SIGNIFICANT CHANGE UP
EOSINOPHIL # BLD AUTO: 1.3 K/UL — HIGH (ref 0–0.5)
EOSINOPHIL NFR BLD AUTO: 11.6 % — HIGH (ref 0–6)
GLUCOSE SERPL-MCNC: 124 MG/DL — HIGH (ref 70–99)
HCT VFR BLD CALC: 40.8 % — SIGNIFICANT CHANGE UP (ref 34.5–45)
HGB BLD-MCNC: 13.3 G/DL — SIGNIFICANT CHANGE UP (ref 11.5–15.5)
IMM GRANULOCYTES NFR BLD AUTO: 0.6 % — SIGNIFICANT CHANGE UP (ref 0–0.9)
INR BLD: 1.09 RATIO — SIGNIFICANT CHANGE UP (ref 0.88–1.16)
LYMPHOCYTES # BLD AUTO: 1.91 K/UL — SIGNIFICANT CHANGE UP (ref 1–3.3)
LYMPHOCYTES # BLD AUTO: 17.1 % — SIGNIFICANT CHANGE UP (ref 13–44)
MCHC RBC-ENTMCNC: 29.5 PG — SIGNIFICANT CHANGE UP (ref 27–34)
MCHC RBC-ENTMCNC: 32.6 GM/DL — SIGNIFICANT CHANGE UP (ref 32–36)
MCV RBC AUTO: 90.5 FL — SIGNIFICANT CHANGE UP (ref 80–100)
MONOCYTES # BLD AUTO: 0.64 K/UL — SIGNIFICANT CHANGE UP (ref 0–0.9)
MONOCYTES NFR BLD AUTO: 5.7 % — SIGNIFICANT CHANGE UP (ref 2–14)
NEUTROPHILS # BLD AUTO: 7.17 K/UL — SIGNIFICANT CHANGE UP (ref 1.8–7.4)
NEUTROPHILS NFR BLD AUTO: 64 % — SIGNIFICANT CHANGE UP (ref 43–77)
PLATELET # BLD AUTO: 335 K/UL — SIGNIFICANT CHANGE UP (ref 150–400)
POTASSIUM SERPL-MCNC: 3.8 MMOL/L — SIGNIFICANT CHANGE UP (ref 3.5–5.3)
POTASSIUM SERPL-SCNC: 3.8 MMOL/L — SIGNIFICANT CHANGE UP (ref 3.5–5.3)
PROT SERPL-MCNC: 7 G/DL — SIGNIFICANT CHANGE UP (ref 6.6–8.7)
PROTHROM AB SERPL-ACNC: 12.6 SEC — SIGNIFICANT CHANGE UP (ref 10.5–13.4)
RAPID RVP RESULT: SIGNIFICANT CHANGE UP
RBC # BLD: 4.51 M/UL — SIGNIFICANT CHANGE UP (ref 3.8–5.2)
RBC # FLD: 14.6 % — HIGH (ref 10.3–14.5)
SARS-COV-2 RNA SPEC QL NAA+PROBE: SIGNIFICANT CHANGE UP
SODIUM SERPL-SCNC: 140 MMOL/L — SIGNIFICANT CHANGE UP (ref 135–145)
TROPONIN T SERPL-MCNC: <0.01 NG/ML — SIGNIFICANT CHANGE UP (ref 0–0.06)
WBC # BLD: 11.2 K/UL — HIGH (ref 3.8–10.5)
WBC # FLD AUTO: 11.2 K/UL — HIGH (ref 3.8–10.5)

## 2022-11-23 PROCEDURE — 99291 CRITICAL CARE FIRST HOUR: CPT

## 2022-11-23 PROCEDURE — 99233 SBSQ HOSP IP/OBS HIGH 50: CPT

## 2022-11-23 PROCEDURE — 93010 ELECTROCARDIOGRAM REPORT: CPT

## 2022-11-23 PROCEDURE — 70450 CT HEAD/BRAIN W/O DYE: CPT | Mod: 26,77,59

## 2022-11-23 RX ORDER — FLUOXETINE HCL 10 MG
60 CAPSULE ORAL AT BEDTIME
Refills: 0 | Status: DISCONTINUED | OUTPATIENT
Start: 2022-11-23 | End: 2022-11-28

## 2022-11-23 RX ORDER — THIAMINE MONONITRATE (VIT B1) 100 MG
100 TABLET ORAL DAILY
Refills: 0 | Status: DISCONTINUED | OUTPATIENT
Start: 2022-11-23 | End: 2022-11-24

## 2022-11-23 RX ORDER — NICARDIPINE HYDROCHLORIDE 30 MG/1
5 CAPSULE, EXTENDED RELEASE ORAL
Qty: 40 | Refills: 0 | Status: DISCONTINUED | OUTPATIENT
Start: 2022-11-23 | End: 2022-11-23

## 2022-11-23 RX ORDER — HYDROXYZINE HCL 10 MG
25 TABLET ORAL THREE TIMES A DAY
Refills: 0 | Status: DISCONTINUED | OUTPATIENT
Start: 2022-11-23 | End: 2022-11-25

## 2022-11-23 RX ORDER — SODIUM CHLORIDE 9 MG/ML
1000 INJECTION INTRAMUSCULAR; INTRAVENOUS; SUBCUTANEOUS
Refills: 0 | Status: DISCONTINUED | OUTPATIENT
Start: 2022-11-23 | End: 2022-11-24

## 2022-11-23 RX ORDER — FOLIC ACID 0.8 MG
1 TABLET ORAL DAILY
Refills: 0 | Status: DISCONTINUED | OUTPATIENT
Start: 2022-11-23 | End: 2022-11-24

## 2022-11-23 RX ORDER — ALTEPLASE 100 MG
5.2 KIT INTRAVENOUS ONCE
Refills: 0 | Status: COMPLETED | OUTPATIENT
Start: 2022-11-23 | End: 2022-11-23

## 2022-11-23 RX ORDER — ALTEPLASE 100 MG
47.2 KIT INTRAVENOUS ONCE
Refills: 0 | Status: COMPLETED | OUTPATIENT
Start: 2022-11-23 | End: 2022-11-23

## 2022-11-23 RX ORDER — ARIPIPRAZOLE 15 MG/1
2 TABLET ORAL AT BEDTIME
Refills: 0 | Status: DISCONTINUED | OUTPATIENT
Start: 2022-11-23 | End: 2022-11-25

## 2022-11-23 RX ORDER — ATORVASTATIN CALCIUM 80 MG/1
80 TABLET, FILM COATED ORAL AT BEDTIME
Refills: 0 | Status: DISCONTINUED | OUTPATIENT
Start: 2022-11-23 | End: 2022-11-28

## 2022-11-23 RX ADMIN — NICARDIPINE HYDROCHLORIDE 25 MG/HR: 30 CAPSULE, EXTENDED RELEASE ORAL at 09:35

## 2022-11-23 RX ADMIN — Medication 100 MILLIGRAM(S): at 23:39

## 2022-11-23 RX ADMIN — SODIUM CHLORIDE 75 MILLILITER(S): 9 INJECTION INTRAMUSCULAR; INTRAVENOUS; SUBCUTANEOUS at 12:02

## 2022-11-23 RX ADMIN — ALTEPLASE 47.2 MILLIGRAM(S): KIT at 09:13

## 2022-11-23 RX ADMIN — ALTEPLASE 5.2 MILLIGRAM(S): KIT at 09:12

## 2022-11-23 RX ADMIN — ALTEPLASE 47.2 MILLIGRAM(S): KIT at 10:13

## 2022-11-23 RX ADMIN — Medication 1 MILLIGRAM(S): at 23:38

## 2022-11-23 RX ADMIN — ALTEPLASE 312 MILLIGRAM(S): KIT at 09:12

## 2022-11-23 RX ADMIN — ARIPIPRAZOLE 2 MILLIGRAM(S): 15 TABLET ORAL at 23:37

## 2022-11-23 NOTE — PATIENT PROFILE ADULT - HEALTH LITERACY
Patient has a history of blood in her stools.  She was again referred to GI for further evaluation and treatment.   no

## 2022-11-23 NOTE — ED ADULT TRIAGE NOTE - RESPIRATORY RATE (BREATHS/MIN)
"Assessment/Plan:     1. Anxiety  Paperwork completed and faxed accordingly.  Will have patient out of work through next week, and she will plan on following up with me next Friday.  We did decide to increase her Celexa to 20 mg once daily.  I also prescribed her some hydroxyzine for acute anxiety episodes.  She may take 1-2 tablets 3 times daily as needed.  We discussed that this will likely cause some drowsiness, therefore I do not recommend driving while using this medication.  It may be beneficial to use at bedtime for anxiety and sleep.  - citalopram (CELEXA) 20 MG tablet; Take 1 tablet (20 mg total) by mouth daily.  Dispense: 30 tablet; Refill: 0  - hydrOXYzine HCl (ATARAX) 25 MG tablet; Take 1-2 tablets (25-50 mg total) by mouth 3 (three) times a day as needed for anxiety.  Dispense: 30 tablet; Refill: 0        Subjective:     Libby Wright is a 58 y.o. female who presents for follow-up regarding anxiety.  Patient was in the ER on Monday with symptoms of chest pain, shortness of breath, and trouble swallowing.  Patient thought this may be an anxiety attack, but was concerned about her new symptoms.  They completed a workup which was negative for ACS.  Patient was discharged, and scheduled for a cardiology follow-up next week.  Patient continues to have a fair amount of anxiety she has not had any panic or anxiety attacks since her ER visit on Monday.  She continues to have difficulty concentrating and now is having a hard time falling asleep at night secondary to anxiety.  She continues to abstain from cigarette smoking.  Patient primarily presents today to have short-term disability paperwork completed for work.  She has not been able to attend work since 3/1/18 due to the symptoms.  Today, patient denies any chest pain, shortness of breath, or difficulty swallowing.  No dizziness or lightheadedness.  She does have some \"burning\" in her upper abdomen.  She does endorse she has had acid reflux in the past.  " 20 "She usually takes Tums for this.  Patient is eating and drinking well without problem.  She continues to take her Celexa and lisinopril as directed.      The following portions of the patient's history were reviewed and updated as appropriate: allergies, current medications, past family history, past medical history, past social history, past surgical history and problem list.    Review of Systems  Pertinent items are noted in HPI.     Objective:     /68 (Patient Site: Right Arm, Patient Position: Sitting, Cuff Size: Adult Large)  Pulse 72  Ht 5' 2\" (1.575 m)  Wt 206 lb (93.4 kg)  Breastfeeding? No  BMI 37.68 kg/m2    General Appearance: Alert, cooperative, no distress, appears stated age  Lungs: Clear to auscultation bilaterally, respirations unlabored  Heart: Regular rate and rhythm, S1 and S2 normal, no murmur, rub, or gallop   Abdomen: Soft, non-tender, bowel sounds active all four quadrants,  no masses, no organomegaly  Psychologic: appropriate affective, answers all of my questions appropriately. No hallucinations, delusion, or suicidal ideations.    LENI-7 Total: 11 (3/23/2018  2:00 PM)      MATILDA Zhao    "

## 2022-11-23 NOTE — ED ADULT NURSE REASSESSMENT NOTE - NIH STROKE SCALE: 8. SENSORY, QM
(0) Normal; no sensory loss

## 2022-11-23 NOTE — CONSULT NOTE ADULT - ASSESSMENT
ASSESSMENT: 61F Smoker / EtOH PMH Depression/Anxiety, COPD not on Home O2, CAD S/p Stent x 2 on ASA/Plavix P/W to ED as a Stroke Code. Presented with last known normal 0700am which shortly after was noted with right hemiparesis, aphasia, and slurred speech.   , NIHSS 13.  TPA given 0924 am (bolus), gtt initiated subsequently after SBP was lowered, not endovascular candidate due to no large vessel occlusion. CTH demonstrated no acute hemorrhage or acute infarction, perfusion with ischemic area in left parietal lobe, CTA with no proximal large vessel occlusion, there is a note of severe focal narrowing vs. occlusion of the distal M2 segment in the left MCA. Etiology, embolic stroke of undetermined source.      NEURO: Continue close monitoring for neurologic deterioration, permissive HTN < 180/105mmHg, titrate statin to LDL goal less than 70, MRI Brain w/o once stable, can obtain MRA head to further evaluate left MCA M2 region of noted stenosis,  Physical therapy/OT/Speech eval/treatment.     ANTITHROMBOTIC THERAPY: none at this time as s/p TPA. After 24 hour TPA protocol, if CTH stable at that time will determine timeline for initiation of antiplatelet regimen.     PULMONARY:   protecting airway, saturating well     CARDIOVASCULAR: check TTE, cardiac monitoring to screen for atrial fibrillation                               SBP goal: permissive htn < 180/105mmHg     GASTROINTESTINAL:  dysphagia screen  passed      Diet: NPO per protocol post TPA    RENAL: BUN/Cr stable, good urine output , hydration as tolerated      Na Goal: Greater than 135       HEMATOLOGY: H/H without anemia, no active bleeding , Platelets 335     DVT ppx: SCD- pharmacological ppx post TPA 24 hour protocol if no evidence of hemorrhage on CT Head at that time, pending repeat     ID: afebrile,  leukocytosis , screen for infection     OTHER:  condition and plan of care d/w patient and sister via telephone, friend at bedside. Questions and concerns addressed.     DISPOSITION: Rehab or home depending on PT eval once stable and workup is complete      CORE MEASURES:        Admission NIHSS: 13     TPA: [x] YES [] NO      LDL/HDL/A1C: pending      Depression Screen: p     Statin Therapy: as noted      Dysphagia Screen: [x] PASS [] FAIL     Smoking [x] YES [] NO - counselling on cessation      Afib [] YES [x] NO     Stroke Education [x] YES [] NO

## 2022-11-23 NOTE — ED ADULT TRIAGE NOTE - CHIEF COMPLAINT QUOTE
61F awake, alert and oriented, aphasic with slurred speech reports sudden onset of these symptoms with collapse to floor and inability to stand/walk at approx 0700. Reports woke up well. Fingerstick in triage 147. Code stroke activated.

## 2022-11-23 NOTE — CONSULT NOTE ADULT - SUBJECTIVE AND OBJECTIVE BOX
Preliminary note, offical recommendations pending attending review/signature   HPI:  61F Smoker / EtOH PMH Depression/Anxiety, COPD not on Home O2, CAD S/p Stent x 2 on ASA/Plavix P/W to ED as a Stroke Code. Acute onset of slurred speech, Rt sided weakness and numbness noted by Pt's friend while getting into a car around ~715AM after shopping at store. No similar events.In ED, NIHSS 13. CTA H/N / Brain and Perfusion + defect, M2 L MCA occlusion/Stenosis Given LWK < 3.5 hr, within window for TPA. Labs w/ WBC 11. SBP > 180, Cardene gtt started for BP control.   TPA given 0924 am (bolus), gtt initiated subsequently   Admission NIHSS 13  Pre-MRS 0    SUBJECTIVE: No events overnight.  No new neurologic complaints.  ROS reported negative unless otherwise noted.    folic acid Injectable 1 milliGRAM(s) IV Push daily  niCARdipine Infusion 5 mG/Hr IV Continuous <Continuous>  sodium chloride 0.9%. 1000 milliLiter(s) IV Continuous <Continuous>  thiamine Injectable 100 milliGRAM(s) IV Push daily      PHYSICAL EXAM:   Vital Signs Last 24 Hrs  T(C): 36.3 (23 Nov 2022 08:50), Max: 36.3 (23 Nov 2022 08:50)  T(F): 97.4 (23 Nov 2022 08:50), Max: 97.4 (23 Nov 2022 08:50)  HR: 63 (23 Nov 2022 13:00) (62 - 69)  BP: 173/98 (23 Nov 2022 13:00) (140/80 - 180/83)  BP(mean): --  RR: 18 (23 Nov 2022 13:00) (18 - 20)  SpO2: 97% (23 Nov 2022 13:00) (94% - 98%)    Parameters below as of 23 Nov 2022 13:00  Patient On (Oxygen Delivery Method): room air        General: No acute distress    NEUROLOGICAL EXAM:  Mental status: Awake, alert, oriented x3, speech fluent, follows commands, no neglect, normal memory   Cranial Nerves: No facial asymmetry, no nystagmus, no dysarthria,  tongue midline  Motor exam: Normal tone, no drift, 5/5 RUE, 5/5 RLE, 5/5 LUE, 5/5 LLE, normal fine finger movements.  Sensation: Intact to light touch   Coordination/ Gait: No dysmetria, gait not tested    LABS:                        13.3   11.20 )-----------( 335      ( 23 Nov 2022 08:55 )             40.8    11-23    140  |  103  |  14.9  ----------------------------<  124<H>  3.8   |  25.0  |  0.65    Ca    9.1      23 Nov 2022 08:55    TPro  7.0  /  Alb  4.2  /  TBili  0.4  /  DBili  x   /  AST  42<H>  /  ALT  28  /  AlkPhos  157<H>  11-23  PT/INR - ( 23 Nov 2022 08:55 )   PT: 12.6 sec;   INR: 1.09 ratio         PTT - ( 23 Nov 2022 08:55 )  PTT:51.5 sec      IMAGING: Reviewed by me.      Preliminary note, offical recommendations pending attending review/signature   HPI:  61F Smoker / EtOH PMH Depression/Anxiety, COPD not on Home O2, CAD S/p Stent x 2 on ASA/Plavix P/W to ED as a Stroke Code. Acute onset of slurred speech, Rt sided weakness and numbness noted by Pt's friend while getting into a car around ~715AM after shopping at store. No similar events.In ED, NIHSS 13. CTA H/N / Brain and Perfusion + defect, M2 L MCA occlusion/Stenosis Given LWK < 3.5 hr, within window for TPA. Labs w/ WBC 11. SBP > 180, Cardene gtt started for BP control.   TPA given 0924 am (bolus), gtt initiated subsequently , not endovascular candidate due to no large vessel occlusion.   Admission NIHSS 13  Pre-MRS 0    SUBJECTIVE: No events overnight.  No new neurologic complaints.  ROS reported negative unless otherwise noted.    folic acid Injectable 1 milliGRAM(s) IV Push daily  niCARdipine Infusion 5 mG/Hr IV Continuous <Continuous>  sodium chloride 0.9%. 1000 milliLiter(s) IV Continuous <Continuous>  thiamine Injectable 100 milliGRAM(s) IV Push daily      PHYSICAL EXAM:   Vital Signs Last 24 Hrs  T(C): 36.3 (23 Nov 2022 08:50), Max: 36.3 (23 Nov 2022 08:50)  T(F): 97.4 (23 Nov 2022 08:50), Max: 97.4 (23 Nov 2022 08:50)  HR: 63 (23 Nov 2022 13:00) (62 - 69)  BP: 173/98 (23 Nov 2022 13:00) (140/80 - 180/83)  BP(mean): --  RR: 18 (23 Nov 2022 13:00) (18 - 20)  SpO2: 97% (23 Nov 2022 13:00) (94% - 98%)    Parameters below as of 23 Nov 2022 13:00  Patient On (Oxygen Delivery Method): room air    General: anxious     NEUROLOGICAL EXAM:  Mental status: Awake, alert, oriented, unable to ID objects, unable to repeat,  frequent paraphasic errors and word substitutions, difficulty following commands , right neglect   Cranial Nerves: right facial palsy, EOMI, VF appear full,  no nystagmus,  subtle dysarthria,  tongue midline  Motor exam: Normal tone, right sided drift, 4/5 RUE, 4/5 RLE, 5/5 LUE, 5/5 LLE  Sensation: right sensory neglect   Coordination/ Gait: RUE dysmetria ,  gait not tested    LABS:                        13.3   11.20 )-----------( 335      ( 23 Nov 2022 08:55 )             40.8    11-23    140  |  103  |  14.9  ----------------------------<  124<H>  3.8   |  25.0  |  0.65    Ca    9.1      23 Nov 2022 08:55    TPro  7.0  /  Alb  4.2  /  TBili  0.4  /  DBili  x   /  AST  42<H>  /  ALT  28  /  AlkPhos  157<H>  11-23  PT/INR - ( 23 Nov 2022 08:55 )   PT: 12.6 sec;   INR: 1.09 ratio         PTT - ( 23 Nov 2022 08:55 )  PTT:51.5 sec      CT Head, CT perfusion  CT Angio Head/ Neck Stroke Protocol w/ IV Cont (11.23.22 @ 09:15)   *  NO EVIDENCE OF AN ACUTE INTRACRANIAL HEMORRHAGE, MIDLINE SHIFT, OR   HYDROCEPHALUS.  *  NO HEMODYNAMIC SIGNIFICANT NARROWING WITHIN THE NECK.  *  SEVERE FOCAL NARROWING VERSUS SMALL FOCAL OCCLUSION DISTAL M2 SEGMENT   POSTERIOR DIVISION LEFT MIDDLE CEREBRAL ARTERY.  *  AREA OF ISCHEMIA LEFT PARIETAL LOBE WITH A VOLUME OF 27 ML, CORE   INFARCT OF 11 ML, AND PENUMBRA OF 16 ML.          HPI:  61F Smoker / EtOH PMH Depression/Anxiety, COPD not on Home O2, CAD S/p Stent x 2 on ASA/Plavix P/W to ED as a Stroke Code. Acute onset of slurred speech, Rt sided weakness and numbness noted by Pt's friend while getting into a car around ~715AM after shopping at store. No similar events.In ED, NIHSS 13. CTA H/N / Brain and Perfusion + defect, M2 L MCA occlusion/Stenosis Given LWK < 3.5 hr, within window for TPA. Labs w/ WBC 11. SBP > 180, Cardene gtt started for BP control.   TPA given 0924 am (bolus), gtt initiated subsequently , not endovascular candidate due to no large vessel occlusion.   Admission NIHSS 13  Pre-MRS 0    SUBJECTIVE: No events overnight.  No new neurologic complaints.  ROS reported negative unless otherwise noted.    folic acid Injectable 1 milliGRAM(s) IV Push daily  niCARdipine Infusion 5 mG/Hr IV Continuous <Continuous>  sodium chloride 0.9%. 1000 milliLiter(s) IV Continuous <Continuous>  thiamine Injectable 100 milliGRAM(s) IV Push daily      PHYSICAL EXAM:   Vital Signs Last 24 Hrs  T(C): 36.3 (23 Nov 2022 08:50), Max: 36.3 (23 Nov 2022 08:50)  T(F): 97.4 (23 Nov 2022 08:50), Max: 97.4 (23 Nov 2022 08:50)  HR: 63 (23 Nov 2022 13:00) (62 - 69)  BP: 173/98 (23 Nov 2022 13:00) (140/80 - 180/83)  BP(mean): --  RR: 18 (23 Nov 2022 13:00) (18 - 20)  SpO2: 97% (23 Nov 2022 13:00) (94% - 98%)    Parameters below as of 23 Nov 2022 13:00  Patient On (Oxygen Delivery Method): room air    General: anxious     NEUROLOGICAL EXAM:  Mental status: Awake, alert, oriented, unable to ID objects, unable to repeat,  frequent paraphasic errors and word substitutions, difficulty following commands , right neglect   Cranial Nerves: right facial palsy, EOMI, VF appear full,  no nystagmus,  subtle dysarthria,  tongue midline  Motor exam: Normal tone, right sided drift, 4/5 RUE, 4/5 RLE, 5/5 LUE, 5/5 LLE  Sensation: right sensory neglect   Coordination/ Gait: RUE dysmetria ,  gait not tested    LABS:                        13.3   11.20 )-----------( 335      ( 23 Nov 2022 08:55 )             40.8    11-23    140  |  103  |  14.9  ----------------------------<  124<H>  3.8   |  25.0  |  0.65    Ca    9.1      23 Nov 2022 08:55    TPro  7.0  /  Alb  4.2  /  TBili  0.4  /  DBili  x   /  AST  42<H>  /  ALT  28  /  AlkPhos  157<H>  11-23  PT/INR - ( 23 Nov 2022 08:55 )   PT: 12.6 sec;   INR: 1.09 ratio         PTT - ( 23 Nov 2022 08:55 )  PTT:51.5 sec      CT Head, CT perfusion  CT Angio Head/ Neck Stroke Protocol w/ IV Cont (11.23.22 @ 09:15)   *  NO EVIDENCE OF AN ACUTE INTRACRANIAL HEMORRHAGE, MIDLINE SHIFT, OR   HYDROCEPHALUS.  *  NO HEMODYNAMIC SIGNIFICANT NARROWING WITHIN THE NECK.  *  SEVERE FOCAL NARROWING VERSUS SMALL FOCAL OCCLUSION DISTAL M2 SEGMENT   POSTERIOR DIVISION LEFT MIDDLE CEREBRAL ARTERY.  *  AREA OF ISCHEMIA LEFT PARIETAL LOBE WITH A VOLUME OF 27 ML, CORE   INFARCT OF 11 ML, AND PENUMBRA OF 16 ML.

## 2022-11-23 NOTE — ED ADULT NURSE REASSESSMENT NOTE - NS ED NURSE REASSESS COMMENT FT1
Patient in cart, awake and alert x4, has no labored breathing, is in no acute distress.  Patient TPA finished.  Patient improved symptoms.  Patient has cardiac monitor in place reads 67 NSR, patient SpO2 is 97% on RA. Patient in cart, awake and alert x4, has no labored breathing, is in no acute distress.  Patient TPA finished.  Patient improved symptoms.  Patient has cardiac monitor in place reads 67 NSR, patient SpO2 is 97% on RA.  Patient with no signs or symptoms of external bleeding.

## 2022-11-23 NOTE — ED PROVIDER NOTE - OBJECTIVE STATEMENT
----- Message from YAMILET Page sent at 7/6/2022  2:06 PM EDT -----  Refer to orthopedic provider   57 yo female with hx htn, cad, brought to ED by friend for difficutly speaking  as per friend, she was staying with patient [place], and last night and this morning patient was at baseline. They went to store at 730, and while getting into the car, patient with difficulty walking, and then had difficulty speaking  patient unable to provide history

## 2022-11-23 NOTE — H&P ADULT - HISTORY OF PRESENT ILLNESS
61F Smoker / EtOH PMH Depression/Anxiety, COPD not on Home O2, CAD S/p Stent x 2 on ASA/Plavix P/W to ED as a Stroke Code. Acute onset of slurred speech, Rt sided weakness and numbness noted by Pt's friend while getting into a car around 730AM after shopping at store. No HA or dizziness. No SOB or fevers. COVID Vac and booster. No similar events.    In ED, NIHSS 13. CTA H/N / Brain and Perfusion + defect, M2 L MCA occlusion/Stenosis Given LWK < 3.5 hr, within window for TPA. Labs w/ WBC 11. SBP > 180, Cardene gtt started for BP control.

## 2022-11-23 NOTE — ED ADULT NURSE REASSESSMENT NOTE - NURSING NEURO ORIENTATION
oriented to person, place and time

## 2022-11-23 NOTE — PHARMACOTHERAPY INTERVENTION NOTE - COMMENTS
Spoke to patient at bedside to obtain medication list, fills at [facility] [place]. Called pharmacy to obtain medication list.   Outpatient Medication Review updated.    HOME MEDICATIONS:  ARIPiprazole 2 mg oral tablet: 1 tab(s) orally once a day (at bedtime) (23 Nov 2022 09:59)  atorvastatin 80 mg oral tablet: 1 tab(s) orally once a day (23 Nov 2022 09:59)  clopidogrel 75 mg oral tablet: 1 tab(s) orally once a day (23 Nov 2022 09:59)  FLUoxetine 20 mg oral capsule: 3 cap(s) orally once a day (23 Nov 2022 09:59)  hydrOXYzine pamoate 25 mg oral capsule: 1 cap(s) orally 3 times a day (23 Nov 2022 09:59)  lisinopril 20 mg oral tablet: 1 tab(s) orally once a day (23 Nov 2022 09:59)  metoprolol succinate 50 mg oral tablet, extended release: 1 tab(s) orally once a day (23 Nov 2022 09:59)  traZODone 50 mg oral tablet: 1 tab(s) orally once a day, As Needed (23 Nov 2022 09:59)  
Code Stroke     Mixed tPA bedside

## 2022-11-23 NOTE — ED PROVIDER NOTE - PHYSICAL EXAMINATION
awake and alert  perrl, eomi  cta b/l,, [id number]  abd soft, non tender  4/5 RUE, 5/5 LUE   5/5 b/l LE  expressive aphasia      NIH 4

## 2022-11-23 NOTE — H&P ADULT - ASSESSMENT
A/P: 61F Smoker / EtOH w/ above PMH Acute Non-hemorraghic CVA S/p TPA 2nd to r/o Etiology-Cardio vs. Vessel.    -Admit to NSICU  -Post TPA prorocol, No blood draws or invasive procedures or avilez for 24 hrs  -Keep -180, Cardene gtt started in ED, wean off as kit  -Supplemental O2 PRN, Madeline Mo  -CK TTE, Stroke Labs-A1C / Lipid  -Rpt CTH in 24 hrs or sooner if change in MS  -MRI once able, Pt w/ Lt clavicle Fx and pin  -PO diet, pass swallow  -PT/OT once able, bedrest for TPA protocol  -Pain Control PRN  -GI/DVT prox    2) Depression/Anxiety  -Resume otpt meds    3) COPD not on Home O2 / + Smoker  -Supplemental O2 PRN, Madeline Mo  -Nebs PRN  -Smoking cessation advised    4) CAD S/p Stent x 2 on ASA/Plavix  -Keep mg > 2, K > 4  -Tele  -Resume ASA/Plavix once TPA protocol completed  -Statin  -SBP given acute event, 120-180, wean Cardene gtt as kit  -Supplemental O2 PRN    5) EtOH  -FA/Thaimine  -Drinking cessation advised    6) Dispo  -D/c planning once med stable

## 2022-11-23 NOTE — ED PROVIDER NOTE - NS ED ROS FT
awake and alert  perrl, eomi  cta b/l,, [id number]  abd soft, non tender  4/5 RUE, 5/5 LUE   5/5 b/l LE  expressive aphasia      NIH 4

## 2022-11-23 NOTE — H&P ADULT - NSHPLABSRESULTS_GEN_ALL_CORE
CTA H/N / Brain and Perfusion:  -NO EVIDENCE OF AN ACUTE INTRACRANIAL HEMORRHAGE, MIDLINE SHIFT, OR   HYDROCEPHALUS.  -NO HEMODYNAMIC SIGNIFICANT NARROWING WITHIN THE NECK.  - SEVERE FOCAL NARROWING VERSUS SMALL FOCAL OCCLUSION DISTAL M2 SEGMENT   POSTERIOR DIVISION LEFT MIDDLE CEREBRAL ARTERY.  -AREA OF ISCHEMIA LEFT PARIETAL LOBE WITH A VOLUME OF 27 ML, CORE   INFARCT OF 11 ML, AND PENUMBRA OF 16 ML.

## 2022-11-23 NOTE — PATIENT PROFILE ADULT - FALL HARM RISK - HARM RISK INTERVENTIONS

## 2022-11-23 NOTE — ED ADULT TRIAGE NOTE - PRO INTERPRETER NEED 2
English Benzoyl Peroxide Pregnancy And Lactation Text: This medication is Pregnancy Category C. It is unknown if benzoyl peroxide is excreted in breast milk.

## 2022-11-23 NOTE — ED ADULT NURSE REASSESSMENT NOTE - NIH STROKE SCALE: 9. BEST LANGUAGE, QM
(0) No aphasia; normal

## 2022-11-23 NOTE — ED ADULT NURSE REASSESSMENT NOTE - NURSING MUSC ROM
- - -
full range of motion in all extremities

## 2022-11-23 NOTE — ED ADULT NURSE REASSESSMENT NOTE - NURSING MUSC STRENGTH
limitations in strength
hand grasp, leg strength strong and equal bilaterally
limitations in strength
hand grasp, leg strength strong and equal bilaterally
hand grasp, leg strength strong and equal bilaterally

## 2022-11-23 NOTE — STROKE CODE NOTE - NSMDCONSULT QTN_Y FT
see official consult note   Risks and benefits discussed with patient and sister via telephone, patient and sister made aware of risk of systemic hemorrhage, verbalize and express understanding noting that benefits > risks , amenable to TPA.   Not endovascular candidate as no LVO

## 2022-11-23 NOTE — H&P ADULT - NSHPPHYSICALEXAM_GEN_ALL_CORE
Awake/Alert x 3  Clear and Intact Speech  PERRLA/EOMI  Following Commands, POSADA x 4 5/5, No Drift  Sen Intact B/L    S1,2 RRR CTA  S/NT/ND  No Edema

## 2022-11-23 NOTE — ED ADULT NURSE REASSESSMENT NOTE - ED CARDIAC RHYTHM
Exams: Head and cervical spine CTs without IV contrast

History: Trauma, fall

Comparison studies: None



Technique:

Axial images were obtained from the brain and cervical spine.

Coronal and sagittal images reconstructed from the axial data.

Intravenous contrast: None



Findings:



Head CT:



 \E\Scalp and bones: Left parietal-occipital scalp swelling without underlying

fracture.



Extra-axial spaces: No masses.  No fluid collections.



Brain sulci: Appropriate for age.

Ventricles: Normal in size and configuration. No hydrocephalus.



Parenchyma: 

No mass acute hemorrhage or acute or chronic cortical vascular insults.

Bifrontal white matter hypodensities, some of these are artifactual, others are

nonspecific and may be artifactual or possibly represent chronic microvascular

ischemic changes.



Sellar/suprasellar region: No abnormalities.

Craniocervical junction: The foramen magnum is patent.  No Chiari one

malformation.



Cervical spine CT:



Fractures: None.

Soft tissues: No gross acute abnormalities.



Atlantoaxial articulation: Intact.

Alignment: Normal lordosis. No scoliosis.

Cervicomedullary junction: No abnormalities. The foramen magnum is patent.



Vertebrae: 

No infection or neoplasm.



Degenerative changes: 

Mild right foraminal stenosis due to uncovertebral arthrosis at C3-C4. Mild to

moderate right C4-C5 facet arthrosis. No canal stenosis.



Incidental findings: 

Partially imaged dental caries with multifocal periodontal disease.



IMPRESSION:



Head CT:

1.  Mild left parieto-occipital scalp swelling without underlying fracture.

2.  No acute intracranial abnormalities.

3.  Subtle bifrontal hypodensities, some of which are artifactual. Others may

be artifactual, or if real possibly reflect nonspecific chronic microvascular

ischemic changes.



Cervical spine CT:

1.  No cervical spine fracture or subluxation.

2.  Cannot adequately evaluate ligament, spinal cord and or vascular

abnormalities on the basis of this examination.



Signed by: Dr. Osito Coats M.D. on 8/15/2018 11:20 AM
Exams: Head and cervical spine CTs without IV contrast

History: Trauma, fall

Comparison studies: None



Technique:

Axial images were obtained from the brain and cervical spine.

Coronal and sagittal images reconstructed from the axial data.

Intravenous contrast: None



Findings:



Head CT:



 \E\Scalp and bones: Left parietal-occipital scalp swelling without underlying

fracture.



Extra-axial spaces: No masses.  No fluid collections.



Brain sulci: Appropriate for age.

Ventricles: Normal in size and configuration. No hydrocephalus.



Parenchyma: 

No mass acute hemorrhage or acute or chronic cortical vascular insults.

Bifrontal white matter hypodensities, some of these are artifactual, others are

nonspecific and may be artifactual or possibly represent chronic microvascular

ischemic changes.



Sellar/suprasellar region: No abnormalities.

Craniocervical junction: The foramen magnum is patent.  No Chiari one

malformation.



Cervical spine CT:



Fractures: None.

Soft tissues: No gross acute abnormalities.



Atlantoaxial articulation: Intact.

Alignment: Normal lordosis. No scoliosis.

Cervicomedullary junction: No abnormalities. The foramen magnum is patent.



Vertebrae: 

No infection or neoplasm.



Degenerative changes: 

Mild right foraminal stenosis due to uncovertebral arthrosis at C3-C4. Mild to

moderate right C4-C5 facet arthrosis. No canal stenosis.



Incidental findings: 

Partially imaged dental caries with multifocal periodontal disease.



IMPRESSION:



Head CT:

1.  Mild left parieto-occipital scalp swelling without underlying fracture.

2.  No acute intracranial abnormalities.

3.  Subtle bifrontal hypodensities, some of which are artifactual. Others may

be artifactual, or if real possibly reflect nonspecific chronic microvascular

ischemic changes.



Cervical spine CT:

1.  No cervical spine fracture or subluxation.

2.  Cannot adequately evaluate ligament, spinal cord and or vascular

abnormalities on the basis of this examination.



Signed by: Dr. Osito Coats M.D. on 8/15/2018 11:20 AM
regular

## 2022-11-23 NOTE — ED PROVIDER NOTE - PROGRESS NOTE DETAILS
patient with sx of acute stroke, code stroke called  neurologist at bedtime  patient given tpa  to be admitted to neuroicu    -md kelly

## 2022-11-23 NOTE — H&P ADULT - CRITICAL CARE ATTENDING COMMENT
Pt seen and examined. Agree with above assessment and plan.   61F Smoker / EtOH PMH Depression/Anxiety, COPD not on Home O2, CAD S/p Stent x 2 on ASA/Plavix P/W to ED as a Stroke Code    symptoms improved  neurointact    post-tpa   neurochecks  CTH in 24hr  continue home meds

## 2022-11-23 NOTE — ED ADULT NURSE REASSESSMENT NOTE - NURSING NEURO LEVEL OF CONSCIOUSNESS
alert and awake

## 2022-11-23 NOTE — STROKE CODE NOTE - THROMBOLYTIC RISK BENEFIT CONVERSATION
Occurred: The risks, benefits, alternatives, and potential complications of thrombolytic (including bleeding in the brain or other parts of the body, allergic reaction, and/or even death) were discussed. We believe the benefits of IV thrombolytic outweigh the risks of the drug.  Through shared decision making, the patient (and/or patient’s agent or surrogate) expressed understanding and agree with plan to treat potential stroke with thrombolytic. Patient (and/or patient’s agent or surrogate) informed of potential for stroke mimics.  An opportunity to ask questions and have them answered was provided

## 2022-11-23 NOTE — ED ADULT NURSE REASSESSMENT NOTE - NIH STROKE SCALE: 11. EXTINCTION AND INATTENTION, QM
(0) No abnormality

## 2022-11-23 NOTE — ED ADULT NURSE REASSESSMENT NOTE - GENERAL PATIENT STATE
family/SO at bedside
comfortable appearance/cooperative/family/SO at bedside
comfortable appearance/cooperative
comfortable appearance/cooperative/family/SO at bedside

## 2022-11-24 LAB
A1C WITH ESTIMATED AVERAGE GLUCOSE RESULT: 5.7 % — HIGH (ref 4–5.6)
ANION GAP SERPL CALC-SCNC: 11 MMOL/L — SIGNIFICANT CHANGE UP (ref 5–17)
BUN SERPL-MCNC: 17.2 MG/DL — SIGNIFICANT CHANGE UP (ref 8–20)
CALCIUM SERPL-MCNC: 8.5 MG/DL — SIGNIFICANT CHANGE UP (ref 8.4–10.5)
CHLORIDE SERPL-SCNC: 100 MMOL/L — SIGNIFICANT CHANGE UP (ref 96–108)
CHOLEST SERPL-MCNC: 146 MG/DL — SIGNIFICANT CHANGE UP
CO2 SERPL-SCNC: 23 MMOL/L — SIGNIFICANT CHANGE UP (ref 22–29)
CREAT SERPL-MCNC: 0.66 MG/DL — SIGNIFICANT CHANGE UP (ref 0.5–1.3)
EGFR: 99 ML/MIN/1.73M2 — SIGNIFICANT CHANGE UP
ESTIMATED AVERAGE GLUCOSE: 117 MG/DL — HIGH (ref 68–114)
GLUCOSE SERPL-MCNC: 105 MG/DL — HIGH (ref 70–99)
HCT VFR BLD CALC: 36.2 % — SIGNIFICANT CHANGE UP (ref 34.5–45)
HCV AB S/CO SERPL IA: 0.17 S/CO — SIGNIFICANT CHANGE UP (ref 0–0.99)
HCV AB SERPL-IMP: SIGNIFICANT CHANGE UP
HDLC SERPL-MCNC: 45 MG/DL — LOW
HGB BLD-MCNC: 11.9 G/DL — SIGNIFICANT CHANGE UP (ref 11.5–15.5)
LIPID PNL WITH DIRECT LDL SERPL: 78 MG/DL — SIGNIFICANT CHANGE UP
MAGNESIUM SERPL-MCNC: 1.8 MG/DL — SIGNIFICANT CHANGE UP (ref 1.6–2.6)
MCHC RBC-ENTMCNC: 29.8 PG — SIGNIFICANT CHANGE UP (ref 27–34)
MCHC RBC-ENTMCNC: 32.9 GM/DL — SIGNIFICANT CHANGE UP (ref 32–36)
MCV RBC AUTO: 90.5 FL — SIGNIFICANT CHANGE UP (ref 80–100)
NON HDL CHOLESTEROL: 101 MG/DL — SIGNIFICANT CHANGE UP
PHOSPHATE SERPL-MCNC: 4.4 MG/DL — SIGNIFICANT CHANGE UP (ref 2.4–4.7)
PLATELET # BLD AUTO: 312 K/UL — SIGNIFICANT CHANGE UP (ref 150–400)
POTASSIUM SERPL-MCNC: 3.4 MMOL/L — LOW (ref 3.5–5.3)
POTASSIUM SERPL-SCNC: 3.4 MMOL/L — LOW (ref 3.5–5.3)
RBC # BLD: 4 M/UL — SIGNIFICANT CHANGE UP (ref 3.8–5.2)
RBC # FLD: 14.6 % — HIGH (ref 10.3–14.5)
SODIUM SERPL-SCNC: 134 MMOL/L — LOW (ref 135–145)
TRIGL SERPL-MCNC: 117 MG/DL — SIGNIFICANT CHANGE UP
WBC # BLD: 9.97 K/UL — SIGNIFICANT CHANGE UP (ref 3.8–10.5)
WBC # FLD AUTO: 9.97 K/UL — SIGNIFICANT CHANGE UP (ref 3.8–10.5)

## 2022-11-24 PROCEDURE — 70450 CT HEAD/BRAIN W/O DYE: CPT | Mod: 26

## 2022-11-24 PROCEDURE — 99233 SBSQ HOSP IP/OBS HIGH 50: CPT

## 2022-11-24 RX ORDER — INFLUENZA VIRUS VACCINE 15; 15; 15; 15 UG/.5ML; UG/.5ML; UG/.5ML; UG/.5ML
0.5 SUSPENSION INTRAMUSCULAR ONCE
Refills: 0 | Status: DISCONTINUED | OUTPATIENT
Start: 2022-11-24 | End: 2022-11-28

## 2022-11-24 RX ORDER — METOPROLOL TARTRATE 50 MG
25 TABLET ORAL
Refills: 0 | Status: DISCONTINUED | OUTPATIENT
Start: 2022-11-24 | End: 2022-11-28

## 2022-11-24 RX ORDER — CLOPIDOGREL BISULFATE 75 MG/1
75 TABLET, FILM COATED ORAL DAILY
Refills: 0 | Status: DISCONTINUED | OUTPATIENT
Start: 2022-11-24 | End: 2022-11-28

## 2022-11-24 RX ORDER — SENNA PLUS 8.6 MG/1
2 TABLET ORAL AT BEDTIME
Refills: 0 | Status: DISCONTINUED | OUTPATIENT
Start: 2022-11-24 | End: 2022-11-28

## 2022-11-24 RX ORDER — ACETAMINOPHEN 500 MG
650 TABLET ORAL EVERY 6 HOURS
Refills: 0 | Status: DISCONTINUED | OUTPATIENT
Start: 2022-11-24 | End: 2022-11-28

## 2022-11-24 RX ORDER — ONDANSETRON 8 MG/1
4 TABLET, FILM COATED ORAL EVERY 6 HOURS
Refills: 0 | Status: DISCONTINUED | OUTPATIENT
Start: 2022-11-24 | End: 2022-11-28

## 2022-11-24 RX ORDER — ASPIRIN/CALCIUM CARB/MAGNESIUM 324 MG
81 TABLET ORAL DAILY
Refills: 0 | Status: DISCONTINUED | OUTPATIENT
Start: 2022-11-24 | End: 2022-11-28

## 2022-11-24 RX ORDER — POTASSIUM CHLORIDE 20 MEQ
10 PACKET (EA) ORAL
Refills: 0 | Status: COMPLETED | OUTPATIENT
Start: 2022-11-24 | End: 2022-11-24

## 2022-11-24 RX ORDER — POLYETHYLENE GLYCOL 3350 17 G/17G
17 POWDER, FOR SOLUTION ORAL DAILY
Refills: 0 | Status: DISCONTINUED | OUTPATIENT
Start: 2022-11-24 | End: 2022-11-28

## 2022-11-24 RX ORDER — FOLIC ACID 0.8 MG
1 TABLET ORAL DAILY
Refills: 0 | Status: DISCONTINUED | OUTPATIENT
Start: 2022-11-24 | End: 2022-11-28

## 2022-11-24 RX ORDER — MAGNESIUM SULFATE 500 MG/ML
1 VIAL (ML) INJECTION ONCE
Refills: 0 | Status: COMPLETED | OUTPATIENT
Start: 2022-11-24 | End: 2022-11-24

## 2022-11-24 RX ORDER — THIAMINE MONONITRATE (VIT B1) 100 MG
100 TABLET ORAL DAILY
Refills: 0 | Status: DISCONTINUED | OUTPATIENT
Start: 2022-11-24 | End: 2022-11-28

## 2022-11-24 RX ORDER — ENOXAPARIN SODIUM 100 MG/ML
40 INJECTION SUBCUTANEOUS
Refills: 0 | Status: DISCONTINUED | OUTPATIENT
Start: 2022-11-24 | End: 2022-11-28

## 2022-11-24 RX ADMIN — Medication 100 MILLIEQUIVALENT(S): at 10:09

## 2022-11-24 RX ADMIN — POLYETHYLENE GLYCOL 3350 17 GRAM(S): 17 POWDER, FOR SOLUTION ORAL at 11:21

## 2022-11-24 RX ADMIN — Medication 1 TABLET(S): at 15:59

## 2022-11-24 RX ADMIN — Medication 81 MILLIGRAM(S): at 11:20

## 2022-11-24 RX ADMIN — Medication 1 MILLIGRAM(S): at 11:21

## 2022-11-24 RX ADMIN — Medication 100 MILLIEQUIVALENT(S): at 06:43

## 2022-11-24 RX ADMIN — Medication 100 GRAM(S): at 10:09

## 2022-11-24 RX ADMIN — ATORVASTATIN CALCIUM 80 MILLIGRAM(S): 80 TABLET, FILM COATED ORAL at 21:42

## 2022-11-24 RX ADMIN — SENNA PLUS 2 TABLET(S): 8.6 TABLET ORAL at 21:43

## 2022-11-24 RX ADMIN — SODIUM CHLORIDE 75 MILLILITER(S): 9 INJECTION INTRAMUSCULAR; INTRAVENOUS; SUBCUTANEOUS at 05:14

## 2022-11-24 RX ADMIN — Medication 25 MILLIGRAM(S): at 15:59

## 2022-11-24 RX ADMIN — Medication 25 MILLIGRAM(S): at 21:43

## 2022-11-24 RX ADMIN — Medication 60 MILLIGRAM(S): at 21:43

## 2022-11-24 RX ADMIN — ARIPIPRAZOLE 2 MILLIGRAM(S): 15 TABLET ORAL at 21:42

## 2022-11-24 RX ADMIN — Medication 25 MILLIGRAM(S): at 18:06

## 2022-11-24 RX ADMIN — ENOXAPARIN SODIUM 40 MILLIGRAM(S): 100 INJECTION SUBCUTANEOUS at 18:06

## 2022-11-24 RX ADMIN — Medication 25 MILLIGRAM(S): at 06:43

## 2022-11-24 RX ADMIN — Medication 100 MILLIGRAM(S): at 11:20

## 2022-11-24 RX ADMIN — CLOPIDOGREL BISULFATE 75 MILLIGRAM(S): 75 TABLET, FILM COATED ORAL at 11:20

## 2022-11-24 NOTE — PROGRESS NOTE ADULT - ASSESSMENT
ASSESSMENT: 61F Smoker / EtOH PMH Depression/Anxiety, COPD not on Home O2, CAD S/p Stent x 2 on ASA/Plavix P/W to ED as a Stroke Code. Presented with last known normal 0700am which shortly after was noted with right hemiparesis, aphasia, and slurred speech.   , NIHSS 13. No absolute contraindications upon review.  TPA given 0924 am 11/23/22  (bolus), gtt initiated subsequently after SBP was lowered, not endovascular candidate due to no large vessel occlusion. CTH demonstrated no acute hemorrhage or acute infarction, perfusion with ischemic area in left parietal lobe, CTA with no proximal large vessel occlusion, there is a note of severe focal narrowing vs. occlusion of the distal M2 segment in the left MCA. Etiology, embolic stroke of undetermined source.      NEURO: Continue close monitoring for neurologic deterioration, permissive HTN < 180/105mmHg, titrate statin to LDL goal less than 70, MRI Brain w/o once stable, can obtain MRA head to further evaluate left MCA M2 region of noted stenosis,  Physical therapy/OT/Speech eval/treatment.     ANTITHROMBOTIC THERAPY: none at this time as s/p TPA. After 24 hour TPA protocol, if CTH stable at that time will determine timeline for initiation of antiplatelet regimen given no hemorrhagic findings.    PULMONARY:   protecting airway, saturating well     CARDIOVASCULAR: check TTE, cardiac monitoring to screen for atrial fibrillation                               SBP goal: permissive htn < 180/105mmHg     GASTROINTESTINAL:  dysphagia screen  passed      Diet: NPO per protocol post TPA    RENAL: BUN/Cr within range, good urine output , hydration as tolerated, monitor mild hyponatremia , KCl supplementation per ICU for hypokalemia      Na Goal: Greater than 135    HEMATOLOGY: H/H without anemia, no active bleeding , Platelets 312     DVT ppx: SCD- pharmacological ppx post TPA 24 hour protocol if no evidence of hemorrhage on CT Head at that time, pending repeat     ID: afebrile,  leukocytosis resolved- possibly reactive, screen for infection     OTHER:       DISPOSITION: Rehab or home depending on PT eval once stable and workup is complete      CORE MEASURES:        Admission NIHSS: 13     TPA: [x] YES [] NO      LDL/HDL/A1C: 78/45/5.7     Depression Screen: p     Statin Therapy: as noted      Dysphagia Screen: [x] PASS [] FAIL     Smoking [x] YES [] NO - counselling on cessation      Afib [] YES [x] NO     Stroke Education [x] YES [] NO    ASSESSMENT: 61F Smoker / EtOH PMH Depression/Anxiety, COPD not on Home O2, CAD S/p Stent x 2 on ASA/Plavix P/W to ED as a Stroke Code. Presented with last known normal 0700am which shortly after was noted with right hemiparesis, aphasia, and slurred speech.   , NIHSS 13. No absolute contraindications upon review.  TPA given 0924 am 11/23/22  (bolus), gtt initiated subsequently after SBP was lowered, not endovascular candidate due to no large vessel occlusion. CTH demonstrated no acute hemorrhage or acute infarction, perfusion with ischemic area in left parietal lobe, CTA with no proximal large vessel occlusion, there is a note of severe focal narrowing vs. occlusion of the distal M2 segment in the left MCA. Etiology, embolic stroke of undetermined source.      NEURO: neurologically overall appears back to baseline, Continue close monitoring for neurologic deterioration, permissive HTN < 180/105mmHg- gradual titration eventually pending , titrate statin to LDL goal less than 70, MRI Brain w/o once stable,   Physical therapy/OT/Speech eval/treatment.     ANTITHROMBOTIC THERAPY:  on DAPT as per home regimen,  no acute hemorrhage or large infarct identified on repeat CT head, initiate after 24 hour protocol complete.     PULMONARY:   protecting airway, saturating well     CARDIOVASCULAR: check TTE, cardiac monitoring to screen for atrial fibrillation with ILR                                SBP goal: permissive htn < 180/105mmHg     GASTROINTESTINAL:  dysphagia screen  passed      Diet: NPO per protocol post TPA    RENAL: BUN/Cr within range, good urine output , hydration as tolerated, monitor mild hyponatremia , KCl supplementation per ICU for hypokalemia      Na Goal: Greater than 135    HEMATOLOGY: H/H without anemia, no active bleeding , Platelets 312     DVT ppx: SCD- pharmacological ppx post TPA 24 hour protocol      ID: afebrile,  leukocytosis resolved- possibly reactive, screen for infection     OTHER:   patient extensively counselled on nicotine cessation, she drinks 3 alcoholic beverages daily - monitor for withdrawal. Adverse risks discussed, defers nicotine patch.     DISPOSITION: Rehab or home depending on PT eval once stable and workup is complete      CORE MEASURES:        Admission NIHSS: 13     TPA: [x] YES [] NO      LDL/HDL/A1C: 78/45/5.7     Depression Screen: p     Statin Therapy: as noted      Dysphagia Screen: [x] PASS [] FAIL     Smoking [x] YES [] NO - counselling on cessation      Afib [] YES [x] NO     Stroke Education [x] YES [] NO

## 2022-11-24 NOTE — PROGRESS NOTE ADULT - ASSESSMENT
ASSESSMENT/PLAN: 61y old  Female who presents with a chief complaint of Slurred speech and Rt sided Numbness/weakness    left MCA M2 region of noted stenosis    NEURO:  stroke  SBP<180/105  post-tpa neurochecks  restart ASA/Plavix after 24h CTH stability  repeat CTH for stability at 24hrs   MRI brain  send stroke core measures  outpatient meds  pain mgt: tylenol and oxy 5 prn  Activity: [x] mobilize as tolerated [] Bedrest [x] PT [x] OT [] PMNR    PULM: room air   SpO2>92%  incentive spirometry   OOB    CV:  SBP goal <180/105  statin    RENAL: monitor I/O  replete lytes prn  voiding  Fluids: IVF while NPO    GI:  Diet: advance diet as tolerated  GI prophylaxis [x] not indicated   Bowel regimen [x] senna [] other:    ENDO:   Goal euglycemia (-180)    HEME/ONC:  VTE prophylaxis: [] SCDs [x] chemoprophylaxis held 24hrs post-tpa    ID: afebrile   no leukocytosis    MISC:    Patient was critically ill and at high risk of death or neurologic complications due to re-bleeding, brain swelling/ compression/ herniation, cardiorespiratory failure.   sleepy since ativan/seizure ASSESSMENT/PLAN: 61y old  Female who presents with a chief complaint of Slurred speech and Rt sided Numbness/weakness    left MCA M2 region of noted stenosis    NEURO:  stroke  SBP<160  post-tpa neurochecks  restart ASA/Plavix after 24h CTH stability  repeat CTH for stability at 24hrs   MRI brain  send stroke core measures  outpatient psych meds  monitor need for CIWA protocol, MVT, thiamine  pain mgt: tylenol and oxy 5 prn  Activity: [x] mobilize as tolerated [] Bedrest [x] PT [x] OT [] PMNR    PULM: room air   SpO2>92%  incentive spirometry   OOB    CV:  SBP goal <160  home metop- start 25mg bid  hold lisinopril for now  TTE  statin    RENAL: monitor I/O  replete lytes prn  voiding  Fluids: IVF while NPO    GI:  Diet: advance diet as tolerated  GI prophylaxis [x] not indicated   Bowel regimen [x] senna [] other:    ENDO:   Goal euglycemia (-180)    HEME/ONC:  VTE prophylaxis: [] SCDs [x] chemoprophylaxis      ID: afebrile   no leukocytosis    MISC:    Patient was critically ill and at high risk of death or neurologic complications due to re-bleeding, brain swelling/ compression/ herniation, cardiorespiratory failure.   ASSESSMENT/PLAN: 61y old  Female who presents with a chief complaint of Slurred speech and Rt sided Numbness/weakness    left MCA M2 region of noted stenosis    NEURO:  stroke  SBP<160  post-tpa neurochecks  restart ASA/Plavix after 24h CTH stability  repeat CTH for stability at 24hrs   MRI brain  send stroke core measures  outpatient psych meds  monitor need for CIWA protocol, MVT, thiamine  pain mgt: tylenol and oxy 5 prn  Activity: [x] mobilize as tolerated [] Bedrest [x] PT [x] OT [] PMNR    PULM: room air   SpO2>92%  incentive spirometry   OOB    CV:  SBP goal <160  home metop- start 25mg bid  hold lisinopril for now  statin  TTE:  Left ventricular ejection fraction, by visual estimation, is 40 to   45%.   2. Basal and mid inferior wall and basal and mid inferolateral wall are   abnormal as described above.  consider cardiology consult    RENAL: monitor I/O  replete lytes prn  voiding  Fluids: IVF while NPO    GI:  Diet: advance diet as tolerated  GI prophylaxis [x] not indicated   Bowel regimen [x] senna [] other:    ENDO:   Goal euglycemia (-180)    HEME/ONC:  VTE prophylaxis: [] SCDs [x] chemoprophylaxis      ID: afebrile   no leukocytosis    MISC:    Patient was critically ill and at high risk of death or neurologic complications due to re-bleeding, brain swelling/ compression/ herniation, cardiorespiratory failure.

## 2022-11-24 NOTE — CHART NOTE - NSCHARTNOTEFT_GEN_A_CORE
Ms. Tacho Perez ([facility]), is a 61F who was diagnosed with stroke s/p TPA, and is being downgraded from the neuro ICU to the medicine service.    HPI:  61F Smoker / EtOH PMH Depression/Anxiety, COPD not on Home O2, CAD S/p Stent x 2 on ASA/Plavix P/W to ED as a Stroke Code. Acute onset of slurred speech, Rt sided weakness and numbness noted by Pt's friend while getting into a car around 730AM after shopping at store. No HA or dizziness. No SOB or fevers. COVID Vac and booster. No similar events.    In ED, NIHSS 13. CTA H/N / Brain and Perfusion + defect, M2 L MCA occlusion/Stenosis Given LWK < 3.5 hr, within window for TPA. Labs w/ WBC 11. SBP > 180, Cardene gtt started for BP control.    (23 Nov 2022 11:31)      Hospital course:  Patient was admitted to NSICU for post-TPA neuro checks. Patient's exam improved, now intact. Patient passed for diet. Patient had 24-hour CTH which showed no hemorrhage, ASA, plavix were resumed and prophylactic lovenox was started.       Vital Signs Last 24 Hrs  T(C): 36.9 (24 Nov 2022 04:00), Max: 36.9 (24 Nov 2022 04:00)  T(F): 98.4 (24 Nov 2022 04:00), Max: 98.4 (24 Nov 2022 04:00)  HR: 59 (24 Nov 2022 08:00) (57 - 76)  BP: 150/80 (24 Nov 2022 08:00) (128/79 - 185/96)  BP(mean): 100 (24 Nov 2022 08:00) (92 - 130)  RR: 21 (24 Nov 2022 08:00) (15 - 26)  SpO2: 92% (24 Nov 2022 08:00) (92% - 99%)    Parameters below as of 24 Nov 2022 08:00  Patient On (Oxygen Delivery Method): room air      Labs:                        11.9   9.97  )-----------( 312      ( 24 Nov 2022 05:15 )             36.2   11-24    134<L>  |  100  |  17.2  ----------------------------<  105<H>  3.4<L>   |  23.0  |  0.66    Ca    8.5      24 Nov 2022 05:15  Phos  4.4     11-24  Mg     1.8     11-24    TPro  7.0  /  Alb  4.2  /  TBili  0.4  /  DBili  x   /  AST  42<H>  /  ALT  28  /  AlkPhos  157<H>  11-23      Physical exam:  NEUROLOGICAL EXAM:  Mental status: Awake, alert, oriented, unable to ID objects, unable to repeat,  frequent paraphasic errors and word substitutions, difficulty following commands , right neglect   Cranial Nerves: right facial palsy, EOMI, VF appear full,  no nystagmus,  subtle dysarthria,  tongue midline  Motor exam: Normal tone, right sided drift, 4/5 RUE, 4/5 RLE, 5/5 LUE, 5/5 LLE  Sensation: right sensory neglect   Coordination/ Gait: RUE dysmetria ,  gait not tested  Cardiac: regular rate and rhythm  Respiratory: non labored breathing  GI: nontender, nondistended, soft       Plan by system:  Neuro:   - Q4 hour neuro checks  - Pending MRI  - Ability, prozac, atarax (home medications)  - 24 hour CTH performed, no hemorrhage    CV:  - SBP goal 100-180 (permissive HTN x48 hours)  - Atorvastatin 80, LDL 78    Resp:  - RA    GI:   - DASH diet  - Thiamine, folate    :  - Voiding    Heme:  - SCDs  - ASA 81, plavix 75  - Lovenox 40     ID:   - Afebrile, no white count    Endo:  - A1C 5.7%      Attestation:   Patient is stable for downgrade from the neuro ICU to the medicine service, accepted by Dr. Quintanilla on 11/24/22 at 0915. If there are further neuro ICU needs, please re-call [phone]. Ms. Tacho Perez ([facility]), is a 61F who was diagnosed with stroke s/p TPA, and is being downgraded from the neuro ICU to the medicine service.    HPI:  61F Smoker / EtOH PMH Depression/Anxiety, COPD not on Home O2, CAD S/p Stent x 2 on ASA/Plavix P/W to ED as a Stroke Code. Acute onset of slurred speech, Rt sided weakness and numbness noted by Pt's friend while getting into a car around 730AM after shopping at store. No HA or dizziness. No SOB or fevers. COVID Vac and booster. No similar events.    In ED, NIHSS 13. CTA H/N / Brain and Perfusion + defect, M2 L MCA occlusion/Stenosis Given LWK < 3.5 hr, within window for TPA. Labs w/ WBC 11. SBP > 180, Cardene gtt started for BP control.    (23 Nov 2022 11:31)      Hospital course:  Patient was admitted to NSICU for post-TPA neuro checks. Patient's exam improved, now intact. Patient passed for diet. Patient had 24-hour CTH which showed no hemorrhage, ASA, plavix were resumed and prophylactic lovenox was started.       Vital Signs Last 24 Hrs  T(C): 36.9 (24 Nov 2022 04:00), Max: 36.9 (24 Nov 2022 04:00)  T(F): 98.4 (24 Nov 2022 04:00), Max: 98.4 (24 Nov 2022 04:00)  HR: 59 (24 Nov 2022 08:00) (57 - 76)  BP: 150/80 (24 Nov 2022 08:00) (128/79 - 185/96)  BP(mean): 100 (24 Nov 2022 08:00) (92 - 130)  RR: 21 (24 Nov 2022 08:00) (15 - 26)  SpO2: 92% (24 Nov 2022 08:00) (92% - 99%)    Parameters below as of 24 Nov 2022 08:00  Patient On (Oxygen Delivery Method): room air      Labs:                        11.9   9.97  )-----------( 312      ( 24 Nov 2022 05:15 )             36.2   11-24    134<L>  |  100  |  17.2  ----------------------------<  105<H>  3.4<L>   |  23.0  |  0.66    Ca    8.5      24 Nov 2022 05:15  Phos  4.4     11-24  Mg     1.8     11-24    TPro  7.0  /  Alb  4.2  /  TBili  0.4  /  DBili  x   /  AST  42<H>  /  ALT  28  /  AlkPhos  157<H>  11-23      Physical exam:  NEUROLOGICAL EXAM:  Mental status: Awake, alert, oriented, unable to ID objects, unable to repeat,  frequent paraphasic errors and word substitutions, difficulty following commands , right neglect   Cranial Nerves: right facial palsy, EOMI, VF appear full,  no nystagmus,  subtle dysarthria,  tongue midline  Motor exam: Normal tone, right sided drift, 4/5 RUE, 4/5 RLE, 5/5 LUE, 5/5 LLE  Sensation: right sensory neglect   Coordination/ Gait: RUE dysmetria ,  gait not tested  Cardiac: regular rate and rhythm  Respiratory: non labored breathing  GI: nontender, nondistended, soft       Plan by system:  Neuro:   - Q4 hour neuro checks  - Pending MRI  - Ability, prozac, atarax (home medications)  - 24 hour CTH performed, no hemorrhage    CV:  - SBP goal 100-160   - Restarted metoprolol 25 BID   - Atorvastatin 80, LDL 78    Resp:  - RA    GI:   - DASH diet  - Thiamine, folate    :  - Voiding    Heme:  - SCDs  - ASA 81, plavix 75  - Lovenox 40     ID:   - Afebrile, no white count    Endo:  - A1C 5.7%      Attestation:   Patient is stable for downgrade from the neuro ICU to the medicine service, accepted by Dr. Quintanilla on 11/24/22 at 0915. If there are further neuro ICU needs, please re-call [phone]. Ms. Tacho Perez ([facility]), is a 61F who was diagnosed with stroke s/p TPA, and is being downgraded from the neuro ICU to the medicine service.    HPI:  61F Smoker / EtOH PMH Depression/Anxiety, COPD not on Home O2, CAD S/p Stent x 2 on ASA/Plavix P/W to ED as a Stroke Code. Acute onset of slurred speech, Rt sided weakness and numbness noted by Pt's friend while getting into a car around 730AM after shopping at store. No HA or dizziness. No SOB or fevers. COVID Vac and booster. No similar events.    In ED, NIHSS 13. CTA H/N / Brain and Perfusion + defect, M2 L MCA occlusion/Stenosis Given LWK < 3.5 hr, within window for TPA. Labs w/ WBC 11. SBP > 180, Cardene gtt started for BP control.    (23 Nov 2022 11:31)      Hospital course:  Patient was admitted to NSICU for post-TPA neuro checks. Patient's exam improved, now intact. Patient passed for diet. Patient had 24-hour CTH which showed no hemorrhage, ASA, plavix were resumed and prophylactic lovenox was started.       Vital Signs Last 24 Hrs  T(C): 36.9 (24 Nov 2022 04:00), Max: 36.9 (24 Nov 2022 04:00)  T(F): 98.4 (24 Nov 2022 04:00), Max: 98.4 (24 Nov 2022 04:00)  HR: 59 (24 Nov 2022 08:00) (57 - 76)  BP: 150/80 (24 Nov 2022 08:00) (128/79 - 185/96)  BP(mean): 100 (24 Nov 2022 08:00) (92 - 130)  RR: 21 (24 Nov 2022 08:00) (15 - 26)  SpO2: 92% (24 Nov 2022 08:00) (92% - 99%)    Parameters below as of 24 Nov 2022 08:00  Patient On (Oxygen Delivery Method): room air      Labs:                        11.9   9.97  )-----------( 312      ( 24 Nov 2022 05:15 )             36.2   11-24    134<L>  |  100  |  17.2  ----------------------------<  105<H>  3.4<L>   |  23.0  |  0.66    Ca    8.5      24 Nov 2022 05:15  Phos  4.4     11-24  Mg     1.8     11-24    TPro  7.0  /  Alb  4.2  /  TBili  0.4  /  DBili  x   /  AST  42<H>  /  ALT  28  /  AlkPhos  157<H>  11-23      Physical exam:  NEUROLOGICAL EXAM:  Mental status: Awake, alert, oriented, unable to ID objects, unable to repeat,  frequent paraphasic errors and word substitutions, difficulty following commands , right neglect   Cranial Nerves: right facial palsy, EOMI, VF appear full,  no nystagmus,  subtle dysarthria,  tongue midline  Motor exam: Normal tone, right sided drift, 4/5 RUE, 4/5 RLE, 5/5 LUE, 5/5 LLE  Sensation: right sensory neglect   Coordination/ Gait: RUE dysmetria ,  gait not tested  Cardiac: regular rate and rhythm  Respiratory: non labored breathing  GI: nontender, nondistended, soft       Plan by system:  Neuro:   - Q4 hour neuro checks  - Pending MRI  - Ability, prozac, atarax (home medications)  - 24 hour CTH performed, no hemorrhage    CV:  - SBP goal 100-160   - Restarted metoprolol 25 BID   - Atorvastatin 80, LDL 78    Resp:  - RA    GI:   - DASH diet  - Thiamine, folate  - Senna, miralax, last BM as outpatient     :  - Voiding    Heme:  - SCDs  - ASA 81, plavix 75  - Lovenox 40     ID:   - Afebrile, no white count    Endo:  - A1C 5.7%      Attestation:   Patient is stable for downgrade from the neuro ICU to the medicine service, accepted by Dr. Quintanilla on 11/24/22 at 0915. If there are further neuro ICU needs, please re-call [phone].

## 2022-11-24 NOTE — PROGRESS NOTE ADULT - SUBJECTIVE AND OBJECTIVE BOX
Chief complaint:   Patient is a 61y old  Female who presents with a chief complaint of Slurred speech and Rt sided Numbness/weakness (24 Nov 2022 06:16)    HPI:  61F Smoker / EtOH PMH Depression/Anxiety, COPD not on Home O2, CAD S/p Stent x 2 on ASA/Plavix P/W to ED as a Stroke Code. Acute onset of slurred speech, Rt sided weakness and numbness noted by Pt's friend while getting into a car around 730AM after shopping at store. No HA or dizziness. No SOB or fevers. COVID Vac and booster. No similar events.    In ED, NIHSS 13. CTA H/N / Brain and Perfusion + defect, M2 L MCA occlusion/Stenosis Given LWK < 3.5 hr, within window for TPA. Labs w/ WBC 11. SBP > 180, Cardene gtt started for BP control.    (23 Nov 2022 11:31)        24hr EVENTS:      ROS: [ ]  Unable to assess due to mental status   All other systems negative    -----------------------------------------------------------------------------------------------------------------------------------------------------------------------------------  ICU Vital Signs Last 24 Hrs  T(C): 36.9 (24 Nov 2022 04:00), Max: 36.9 (24 Nov 2022 04:00)  T(F): 98.4 (24 Nov 2022 04:00), Max: 98.4 (24 Nov 2022 04:00)  HR: 59 (24 Nov 2022 06:00) (57 - 76)  BP: 146/98 (24 Nov 2022 06:00) (128/79 - 185/96)  BP(mean): 113 (24 Nov 2022 06:00) (92 - 130)  ABP: --  ABP(mean): --  RR: 22 (24 Nov 2022 06:00) (15 - 26)  SpO2: 95% (24 Nov 2022 06:00) (92% - 99%)    O2 Parameters below as of 24 Nov 2022 04:01  Patient On (Oxygen Delivery Method): room air            I&O's Summary    23 Nov 2022 07:01  -  24 Nov 2022 07:00  --------------------------------------------------------  IN: 1050 mL / OUT: 650 mL / NET: 400 mL        MEDICATIONS  (STANDING):  ARIPiprazole 2 milliGRAM(s) Oral at bedtime  atorvastatin 80 milliGRAM(s) Oral at bedtime  FLUoxetine 60 milliGRAM(s) Oral at bedtime  folic acid 1 milliGRAM(s) Oral daily  hydrOXYzine hydrochloride 25 milliGRAM(s) Oral three times a day  influenza   Vaccine 0.5 milliLiter(s) IntraMuscular once  magnesium sulfate  IVPB 1 Gram(s) IV Intermittent once  potassium chloride  10 mEq/100 mL IVPB 10 milliEquivalent(s) IV Intermittent every 1 hour  sodium chloride 0.9%. 1000 milliLiter(s) (75 mL/Hr) IV Continuous <Continuous>  thiamine 100 milliGRAM(s) Oral daily      RESPIRATORY:        IMAGING:   Recent imaging studies were reviewed.    LAB RESULTS:                          11.9   9.97  )-----------( 312      ( 24 Nov 2022 05:15 )             36.2       PT/INR - ( 23 Nov 2022 08:55 )   PT: 12.6 sec;   INR: 1.09 ratio         PTT - ( 23 Nov 2022 08:55 )  PTT:51.5 sec    11-24    134<L>  |  100  |  17.2  ----------------------------<  105<H>  3.4<L>   |  23.0  |  0.66    Ca    8.5      24 Nov 2022 05:15  Phos  4.4     11-24  Mg     1.8     11-24    TPro  7.0  /  Alb  4.2  /  TBili  0.4  /  DBili  x   /  AST  42<H>  /  ALT  28  /  AlkPhos  157<H>  11-23      -----------------------------------------------------------------------------------------------------------------------------------------------------------------------------------  PHYSICAL EXAM:  General: Calm, laying in bed  HEENT: MMM  Neuro:  -Mental status- No acute distress, AOx3, conversational, following commands  -CN- PERRL 3mm, EOMI, tongue midline, face symmetric  -Motor- full strength in all ext  -Sensation- intact to LT   -Coordination- no dysmetria noted    CV:   Pulm: Clear to auscultation  Abd: Soft, nontender, nondistended  Ext: No edema  Skin: warm, dry     Chief complaint:   Patient is a 61y old  Female who presents with a chief complaint of Slurred speech and Rt sided Numbness/weakness (24 Nov 2022 06:16)    HPI:  61F Smoker / EtOH PMH Depression/Anxiety, COPD not on Home O2, CAD S/p Stent x 2 on ASA/Plavix P/W to ED as a Stroke Code. Acute onset of slurred speech, Rt sided weakness and numbness noted by Pt's friend while getting into a car around 730AM after shopping at store. No HA or dizziness. No SOB or fevers. COVID Vac and booster. No similar events.    In ED, NIHSS 13. CTA H/N / Brain and Perfusion + defect, M2 L MCA occlusion/Stenosis Given LWK < 3.5 hr, within window for TPA. Labs w/ WBC 11. SBP > 180, Cardene gtt started for BP control.    (23 Nov 2022 11:31)      24hr EVENTS:  no acute issues    ROS: no complaints  All other systems negative    -----------------------------------------------------------------------------------------------------------------------------------------------------------------------------------  ICU Vital Signs Last 24 Hrs  T(C): 36.9 (24 Nov 2022 04:00), Max: 36.9 (24 Nov 2022 04:00)  T(F): 98.4 (24 Nov 2022 04:00), Max: 98.4 (24 Nov 2022 04:00)  HR: 59 (24 Nov 2022 06:00) (57 - 76)  BP: 146/98 (24 Nov 2022 06:00) (128/79 - 185/96)  BP(mean): 113 (24 Nov 2022 06:00) (92 - 130)  ABP: --  ABP(mean): --  RR: 22 (24 Nov 2022 06:00) (15 - 26)  SpO2: 95% (24 Nov 2022 06:00) (92% - 99%)    O2 Parameters below as of 24 Nov 2022 04:01  Patient On (Oxygen Delivery Method): room air            I&O's Summary    23 Nov 2022 07:01  -  24 Nov 2022 07:00  --------------------------------------------------------  IN: 1050 mL / OUT: 650 mL / NET: 400 mL        MEDICATIONS  (STANDING):  ARIPiprazole 2 milliGRAM(s) Oral at bedtime  atorvastatin 80 milliGRAM(s) Oral at bedtime  FLUoxetine 60 milliGRAM(s) Oral at bedtime  folic acid 1 milliGRAM(s) Oral daily  hydrOXYzine hydrochloride 25 milliGRAM(s) Oral three times a day  influenza   Vaccine 0.5 milliLiter(s) IntraMuscular once  magnesium sulfate  IVPB 1 Gram(s) IV Intermittent once  potassium chloride  10 mEq/100 mL IVPB 10 milliEquivalent(s) IV Intermittent every 1 hour  sodium chloride 0.9%. 1000 milliLiter(s) (75 mL/Hr) IV Continuous <Continuous>  thiamine 100 milliGRAM(s) Oral daily      IMAGING:   Recent imaging studies were reviewed.    LAB RESULTS:                          11.9   9.97  )-----------( 312      ( 24 Nov 2022 05:15 )             36.2       PT/INR - ( 23 Nov 2022 08:55 )   PT: 12.6 sec;   INR: 1.09 ratio         PTT - ( 23 Nov 2022 08:55 )  PTT:51.5 sec    11-24    134<L>  |  100  |  17.2  ----------------------------<  105<H>  3.4<L>   |  23.0  |  0.66    Ca    8.5      24 Nov 2022 05:15  Phos  4.4     11-24  Mg     1.8     11-24    TPro  7.0  /  Alb  4.2  /  TBili  0.4  /  DBili  x   /  AST  42<H>  /  ALT  28  /  AlkPhos  157<H>  11-23      -----------------------------------------------------------------------------------------------------------------------------------------------------------------------------------  PHYSICAL EXAM:  General: Calm, laying in bed  HEENT: MMM  Neuro:  -Mental status- No acute distress, AOx3, conversational, following commands  -CN- PERRL 3mm, EOMI, tongue midline, face symmetric  -Motor- full strength in all ext  -Sensation- intact to LT   -Coordination- no dysmetria noted    CV: RRR  Pulm: Clear to auscultation  Abd: Soft, nontender, nondistended  Ext: No edema  Skin: warm, dry

## 2022-11-24 NOTE — PROGRESS NOTE ADULT - ASSESSMENT
INCOMPLETE 61 year old female with history of Smoking, CAD s/p PCI, HTN, HLD, COPD, Depression and Anxiety presented with right sided weakness and numbness. She was Code Stroke in ER.   + defect, M2 L MCA occlusion/Stenosis    INCOMPLETE 61 year old female with history of Smoking, CAD s/p PCI, HTN, HLD, COPD, Depression, Anxiety and Alcoholism presented with right sided weakness and numbness. She was Code Stroke in ER. CT showed + defect, M2 L MCA occlusion/Stenosis. She was given tPA and was admitted to ICU. She was started on Cardene Infusion for BP management and it has been weaned off. Symptoms are improving. Repeat CT Head with no hemorrhage. She is stable for downgrade to Medicine Service.    1) Acute CVA  - s/p tPA  - Stroke Protocol  - LDL 78  - HbA1c 5.7  - MRI Brain pending   - ECHO done, report pending   - Continue Aspirin, Plavix and Lipitor   - Permissive HTN for 48 hours  - PT/OT  - Neuro follow up noted     2) Smoking  - Cessation counselling provided  - Does not want Nicotine Patch     3) CAD / HLD / HTN  - Continue Aspirin, Plavix, Lipitor and Metoprolol     4) Alcoholism  - [facility] Protocol  - Thiamine for suspected deficiency, MVI and Folic Acid     5) Depression / Anxiety   - Continue Prozac and Abilify   - Psych Consult     6) Glucose Intolerance   - HbA1c 5.7  - Accu checks and ISS    7) Hypokalemia   - Repleted     DVT Prophylaxis -- Lovenox SQ    Dispo: Likely home in 48 hours.

## 2022-11-24 NOTE — PROGRESS NOTE ADULT - SUBJECTIVE AND OBJECTIVE BOX
TRANSFER NOTE / NSICU DOWNGRADE     Cerebral infarction    HPI:  61F Smoker / EtOH PMH Depression/Anxiety, COPD not on Home O2, CAD S/p Stent x 2 on ASA/Plavix P/W to ED as a Stroke Code. Acute onset of slurred speech, Rt sided weakness and numbness noted by Pt's friend while getting into a car around 730AM after shopping at store. No HA or dizziness. No SOB or fevers. COVID Vac and booster. No similar events.    In ED, NIHSS 13. CTA H/N / Brain and Perfusion + defect, M2 L MCA occlusion/Stenosis Given LWK < 3.5 hr, within window for TPA. Labs w/ WBC 11. SBP > 180, Cardene gtt started for BP control.    (23 Nov 2022 11:31)      Interval History:  Patient was seen and examined at bedside.   Right sided weakness / numbness are improving.   Denies headache, dizziness, visual symptoms or difficulty speaking/swallowing.   Denies chest pain, palpitations, shortness of breath, nausea, vomiting or abdominal pain.    ROS:  As per interval history otherwise unremarkable.    PHYSICAL EXAM:  Vital Signs   T(C): 36.8 (24 Nov 2022 08:00), Max: 36.9 (24 Nov 2022 04:00)  T(F): 98.2 (24 Nov 2022 08:00), Max: 98.4 (24 Nov 2022 04:00)  HR: 71 (24 Nov 2022 09:12) (57 - 76)  BP: 140/86 (24 Nov 2022 09:12) (128/79 - 185/96)  BP(mean): 104 (24 Nov 2022 09:12) (92 - 130)  RR: 13 (24 Nov 2022 09:12) (13 - 26)  SpO2: 93% (24 Nov 2022 09:12) (92% - 99%)  Parameters below as of 24 Nov 2022 08:00  Patient On (Oxygen Delivery Method): room air  General: Elderly female sitting in bed comfortably. No acute distress  HEENT: PERRLA. EOMI. Clear conjunctivae. Moist mucus membrane  Neck: Supple.   Chest: CTA bilaterally. No wheezing, rales or rhonchi.   Heart: Normal S1 & S2. RRR.   Abdomen: Non distended. Soft. Non-tender. + BS  Ext: No pedal edema. No calf tenderness   Neuro: AAO x 3. Motor: 5/5 in RUE/LUE/LLE and 4-5/5 in RLE. Sensation decreased on right arm/leg. Reflexes 1+. No speech disorder  Skin: Warm and Dry  Psychiatry: Normal mood and affect    I&O's Summary    23 Nov 2022 07:01  -  24 Nov 2022 07:00  --------------------------------------------------------  IN: 1050 mL / OUT: 650 mL / NET: 400 mL      LABS:                      11.9   9.97  )-----------( 312      ( 24 Nov 2022 05:15 )             36.2     11-24    134<L>  |  100  |  17.2  ----------------------------<  105<H>  3.4<L>   |  23.0  |  0.66    Ca    8.5      24 Nov 2022 05:15  Phos  4.4     11-24  Mg     1.8     11-24    TPro  7.0  /  Alb  4.2  /  TBili  0.4  /  DBili  x   /  AST  42<H>  /  ALT  28  /  AlkPhos  157<H>  11-23    PT/INR - ( 23 Nov 2022 08:55 )   PT: 12.6 sec;   INR: 1.09 ratio       PTT - ( 23 Nov 2022 08:55 )  PTT:51.5 sec    RADIOLOGY & ADDITIONAL STUDIES:  Reviewed     MEDICATIONS  (STANDING):  ARIPiprazole 2 milliGRAM(s) Oral at bedtime  aspirin  chewable 81 milliGRAM(s) Oral daily  atorvastatin 80 milliGRAM(s) Oral at bedtime  clopidogrel Tablet 75 milliGRAM(s) Oral daily  enoxaparin Injectable 40 milliGRAM(s) SubCutaneous <User Schedule>  FLUoxetine 60 milliGRAM(s) Oral at bedtime  folic acid 1 milliGRAM(s) Oral daily  hydrOXYzine hydrochloride 25 milliGRAM(s) Oral three times a day  influenza   Vaccine 0.5 milliLiter(s) IntraMuscular once  metoprolol tartrate 25 milliGRAM(s) Oral two times a day  polyethylene glycol 3350 17 Gram(s) Oral daily  senna 2 Tablet(s) Oral at bedtime  thiamine 100 milliGRAM(s) Oral daily           TRANSFER NOTE / NSICU DOWNGRADE     Cerebral infarction    HPI:  61F Smoker / EtOH PMH Depression/Anxiety, COPD not on Home O2, CAD S/p Stent x 2 on ASA/Plavix P/W to ED as a Stroke Code. Acute onset of slurred speech, Rt sided weakness and numbness noted by Pt's friend while getting into a car around 730AM after shopping at store. No HA or dizziness. No SOB or fevers. COVID Vac and booster. No similar events.    In ED, NIHSS 13. CTA H/N / Brain and Perfusion + defect, M2 L MCA occlusion/Stenosis Given LWK < 3.5 hr, within window for TPA. Labs w/ WBC 11. SBP > 180, Cardene gtt started for BP control.    (23 Nov 2022 11:31)      Interval History:  Patient was seen and examined at bedside.   Right sided weakness / numbness are improving.   Denies headache, dizziness, visual symptoms or difficulty speaking/swallowing.   Denies chest pain, palpitations, shortness of breath, nausea, vomiting or abdominal pain.    ROS:  As per interval history otherwise unremarkable.    PHYSICAL EXAM:  Vital Signs   T(C): 36.8 (24 Nov 2022 08:00), Max: 36.9 (24 Nov 2022 04:00)  T(F): 98.2 (24 Nov 2022 08:00), Max: 98.4 (24 Nov 2022 04:00)  HR: 71 (24 Nov 2022 09:12) (57 - 76)  BP: 140/86 (24 Nov 2022 09:12) (128/79 - 185/96)  BP(mean): 104 (24 Nov 2022 09:12) (92 - 130)  RR: 13 (24 Nov 2022 09:12) (13 - 26)  SpO2: 93% (24 Nov 2022 09:12) (92% - 99%)  Parameters below as of 24 Nov 2022 08:00  Patient On (Oxygen Delivery Method): room air  General: Elderly female sitting in bed comfortably. No acute distress  HEENT: PERRLA. EOMI. Clear conjunctivae. Moist mucus membrane  Neck: Supple.   Chest: CTA bilaterally. No wheezing, rales or rhonchi.   Heart: Normal S1 & S2. RRR.   Abdomen: Non distended. Soft. Non-tender. + BS  Ext: No pedal edema. No calf tenderness   Neuro: AAO x 3. Motor: 5/5 in RUE/LUE/LLE and 4-5/5 in RLE. Sensation decreased on right arm/leg. Reflexes 1+. No speech disorder  Skin: Warm and Dry  Psychiatry: Normal mood and affect    I&O's Summary    23 Nov 2022 07:01  -  24 Nov 2022 07:00  --------------------------------------------------------  IN: 1050 mL / OUT: 650 mL / NET: 400 mL      LABS:                      11.9   9.97  )-----------( 312      ( 24 Nov 2022 05:15 )             36.2     11-24    134<L>  |  100  |  17.2  ----------------------------<  105<H>  3.4<L>   |  23.0  |  0.66    Ca    8.5      24 Nov 2022 05:15  Phos  4.4     11-24  Mg     1.8     11-24    TPro  7.0  /  Alb  4.2  /  TBili  0.4  /  DBili  x   /  AST  42<H>  /  ALT  28  /  AlkPhos  157<H>  11-23    PT/INR - ( 23 Nov 2022 08:55 )   PT: 12.6 sec;   INR: 1.09 ratio       PTT - ( 23 Nov 2022 08:55 )  PTT:51.5 sec    RADIOLOGY & ADDITIONAL STUDIES:  CT Brain Stroke Protocol (11.23.22 @ 09:04)   ACC: [id number] EXAM:  CT ANGIO BRAIN STROKE PROT IC                        ACC: [id number] EXAM:  CT ANGIO NECK STROKE PROT IC                        ACC: [id number] EXAM:  CT BRAIN STROKE PROTOCOL                        ACC: [id number] EXAM:  CT BRAINPERFUSION MAPS STROKE                 *  NO EVIDENCE OF AN ACUTE INTRACRANIAL HEMORRHAGE, MIDLINE SHIFT, OR HYDROCEPHALUS.  *  NO HEMODYNAMIC SIGNIFICANT NARROWING WITHIN THE NECK.  *  SEVERE FOCAL NARROWING VERSUS SMALL FOCAL OCCLUSION DISTAL M2 SEGMENT POSTERIOR DIVISION LEFT MIDDLE CEREBRAL ARTERY.  *  AREA OF ISCHEMIA LEFT PARIETAL LOBE WITH A VOLUME OF 27 ML, CORE INFARCT OF 11 ML, AND PENUMBRA OF 16 ML.    MEDICATIONS  (STANDING):  ARIPiprazole 2 milliGRAM(s) Oral at bedtime  aspirin  chewable 81 milliGRAM(s) Oral daily  atorvastatin 80 milliGRAM(s) Oral at bedtime  clopidogrel Tablet 75 milliGRAM(s) Oral daily  enoxaparin Injectable 40 milliGRAM(s) SubCutaneous <User Schedule>  FLUoxetine 60 milliGRAM(s) Oral at bedtime  folic acid 1 milliGRAM(s) Oral daily  hydrOXYzine hydrochloride 25 milliGRAM(s) Oral three times a day  influenza   Vaccine 0.5 milliLiter(s) IntraMuscular once  metoprolol tartrate 25 milliGRAM(s) Oral two times a day  polyethylene glycol 3350 17 Gram(s) Oral daily  senna 2 Tablet(s) Oral at bedtime  thiamine 100 milliGRAM(s) Oral daily           TRANSFER NOTE / NSICU DOWNGRADE     Cerebral infarction    HPI:  61F Smoker / EtOH PMH Depression/Anxiety, COPD not on Home O2, CAD S/p Stent x 2 on ASA/Plavix P/W to ED as a Stroke Code. Acute onset of slurred speech, Rt sided weakness and numbness noted by Pt's friend while getting into a car around 730AM after shopping at store. No HA or dizziness. No SOB or fevers. COVID Vac and booster. No similar events.    In ED, NIHSS 13. CTA H/N / Brain and Perfusion + defect, M2 L MCA occlusion/Stenosis Given LWK < 3.5 hr, within window for TPA. Labs w/ WBC 11. SBP > 180, Cardene gtt started for BP control.    (23 Nov 2022 11:31)    Hospital Course:  61 year old female with history of Smoking, CAD s/p PCI, HTN, HLD, COPD, Depression, Anxiety and Alcoholism presented with right sided weakness and numbness. She was Code Stroke in ER. CT showed + defect, M2 L MCA occlusion/Stenosis. She was given tPA and was admitted to ICU. She was started on Cardene Infusion for BP management and it has been weaned off. Symptoms are improving. Repeat CT Head with no hemorrhage. She is stable for downgrade to Medicine Service.    Interval History:  Patient was seen and examined at bedside.   Right sided weakness / numbness are improving.   Denies headache, dizziness, visual symptoms or difficulty speaking/swallowing.   Denies chest pain, palpitations, shortness of breath, nausea, vomiting or abdominal pain.    ROS:  As per interval history otherwise unremarkable.    PHYSICAL EXAM:  Vital Signs   T(C): 36.8 (24 Nov 2022 08:00), Max: 36.9 (24 Nov 2022 04:00)  T(F): 98.2 (24 Nov 2022 08:00), Max: 98.4 (24 Nov 2022 04:00)  HR: 71 (24 Nov 2022 09:12) (57 - 76)  BP: 140/86 (24 Nov 2022 09:12) (128/79 - 185/96)  BP(mean): 104 (24 Nov 2022 09:12) (92 - 130)  RR: 13 (24 Nov 2022 09:12) (13 - 26)  SpO2: 93% (24 Nov 2022 09:12) (92% - 99%)  Parameters below as of 24 Nov 2022 08:00  Patient On (Oxygen Delivery Method): room air  General: Elderly female sitting in bed comfortably. No acute distress  HEENT: PERRLA. EOMI. Clear conjunctivae. Moist mucus membrane  Neck: Supple.   Chest: CTA bilaterally. No wheezing, rales or rhonchi.   Heart: Normal S1 & S2. RRR.   Abdomen: Non distended. Soft. Non-tender. + BS  Ext: No pedal edema. No calf tenderness   Neuro: AAO x 3. Motor: 5/5 in LUE/LLE and 4-5/5 in RUE/RLE. Sensation decreased on right arm/leg. Reflexes 1+. No speech disorder  Skin: Warm and Dry  Psychiatry: Normal mood and affect    I&O's Summary    23 Nov 2022 07:01  -  24 Nov 2022 07:00  --------------------------------------------------------  IN: 1050 mL / OUT: 650 mL / NET: 400 mL      LABS:                      11.9   9.97  )-----------( 312      ( 24 Nov 2022 05:15 )             36.2     11-24    134<L>  |  100  |  17.2  ----------------------------<  105<H>  3.4<L>   |  23.0  |  0.66    Ca    8.5      24 Nov 2022 05:15  Phos  4.4     11-24  Mg     1.8     11-24    TPro  7.0  /  Alb  4.2  /  TBili  0.4  /  DBili  x   /  AST  42<H>  /  ALT  28  /  AlkPhos  157<H>  11-23    PT/INR - ( 23 Nov 2022 08:55 )   PT: 12.6 sec;   INR: 1.09 ratio       PTT - ( 23 Nov 2022 08:55 )  PTT:51.5 sec    RADIOLOGY & ADDITIONAL STUDIES:  CT Brain Stroke Protocol (11.23.22 @ 09:04)   ACC: [id number] EXAM:  [facility]                        ACC: [id number] EXAM:  CT ANGIO NECK STROKE PROTCL IC                        ACC: [id number] EXAM:  CT BRAIN STROKE PROTOCOL                        ACC: [id number] EXAM:  CT BRAINPERFUSION MAPS STROKE                 *  NO EVIDENCE OF AN ACUTE INTRACRANIAL HEMORRHAGE, MIDLINE SHIFT, OR HYDROCEPHALUS.  *  NO HEMODYNAMIC SIGNIFICANT NARROWING WITHIN THE NECK.  *  SEVERE FOCAL NARROWING VERSUS SMALL FOCAL OCCLUSION DISTAL M2 SEGMENT POSTERIOR DIVISION LEFT MIDDLE CEREBRAL ARTERY.  *  AREA OF ISCHEMIA LEFT PARIETAL LOBE WITH A VOLUME OF 27 ML, CORE INFARCT OF 11 ML, AND PENUMBRA OF 16 ML.    MEDICATIONS  (STANDING):  ARIPiprazole 2 milliGRAM(s) Oral at bedtime  aspirin  chewable 81 milliGRAM(s) Oral daily  atorvastatin 80 milliGRAM(s) Oral at bedtime  clopidogrel Tablet 75 milliGRAM(s) Oral daily  enoxaparin Injectable 40 milliGRAM(s) SubCutaneous <User Schedule>  FLUoxetine 60 milliGRAM(s) Oral at bedtime  folic acid 1 milliGRAM(s) Oral daily  hydrOXYzine hydrochloride 25 milliGRAM(s) Oral three times a day  influenza   Vaccine 0.5 milliLiter(s) IntraMuscular once  metoprolol tartrate 25 milliGRAM(s) Oral two times a day  polyethylene glycol 3350 17 Gram(s) Oral daily  senna 2 Tablet(s) Oral at bedtime  thiamine 100 milliGRAM(s) Oral daily

## 2022-11-24 NOTE — PROGRESS NOTE ADULT - SUBJECTIVE AND OBJECTIVE BOX
Preliminary note, offical recommendations pending attending review/signature   HPI:  61F Smoker / EtOH PMH Depression/Anxiety, COPD not on Home O2, CAD S/p Stent x 2 on ASA/Plavix P/W to ED as a Stroke Code. Acute onset of slurred speech, Rt sided weakness and numbness noted by Pt's friend while getting into a car around ~715AM after shopping at store. No similar events.In ED, NIHSS 13. CTA H/N / Brain and Perfusion + defect, M2 L MCA occlusion/Stenosis Given LWK < 3.5 hr, within window for TPA. Labs w/ WBC 11. SBP > 180, Cardene gtt started for BP control.   TPA given 0924 am (bolus), gtt initiated subsequently , not endovascular candidate due to no large vessel occlusion.   Admission NIHSS 13  Pre-MRS 0    SUBJECTIVE: No events overnight.  No new neurologic complaints.  ROS reported negative unless otherwise noted.    ARIPiprazole 2 milliGRAM(s) Oral at bedtime  atorvastatin 80 milliGRAM(s) Oral at bedtime  FLUoxetine 60 milliGRAM(s) Oral at bedtime  folic acid Injectable 1 milliGRAM(s) IV Push daily  hydrOXYzine hydrochloride 25 milliGRAM(s) Oral three times a day  magnesium sulfate  IVPB 1 Gram(s) IV Intermittent once  potassium chloride  10 mEq/100 mL IVPB 10 milliEquivalent(s) IV Intermittent every 1 hour  sodium chloride 0.9%. 1000 milliLiter(s) IV Continuous <Continuous>  thiamine Injectable 100 milliGRAM(s) IV Push daily      PHYSICAL EXAM:   Vital Signs Last 24 Hrs  T(C): 36.9 (24 Nov 2022 04:00), Max: 36.9 (24 Nov 2022 04:00)  T(F): 98.4 (24 Nov 2022 04:00), Max: 98.4 (24 Nov 2022 04:00)  HR: 59 (24 Nov 2022 06:00) (57 - 76)  BP: 146/98 (24 Nov 2022 06:00) (128/79 - 185/96)  BP(mean): 113 (24 Nov 2022 06:00) (92 - 130)  RR: 22 (24 Nov 2022 06:00) (15 - 26)  SpO2: 95% (24 Nov 2022 06:00) (92% - 99%)    Parameters below as of 24 Nov 2022 04:01  Patient On (Oxygen Delivery Method): room air    General: anxious     NEUROLOGICAL EXAM:  Mental status: Awake, alert, oriented, unable to ID objects, unable to repeat,  frequent paraphasic errors and word substitutions, difficulty following commands , right neglect   Cranial Nerves: right facial palsy, EOMI, VF appear full,  no nystagmus,  subtle dysarthria,  tongue midline  Motor exam: Normal tone, right sided drift, 4/5 RUE, 4/5 RLE, 5/5 LUE, 5/5 LLE  Sensation: right sensory neglect   Coordination/ Gait: RUE dysmetria ,  gait not tested    LABS:                        11.9   9.97  )-----------( 312      ( 24 Nov 2022 05:15 )             36.2    11-24    134<L>  |  100  |  17.2  ----------------------------<  105<H>  3.4<L>   |  23.0  |  0.66    Ca    8.5      24 Nov 2022 05:15  Phos  4.4     11-24  Mg     1.8     11-24    TPro  7.0  /  Alb  4.2  /  TBili  0.4  /  DBili  x   /  AST  42<H>  /  ALT  28  /  AlkPhos  157<H>  11-23  PT/INR - ( 23 Nov 2022 08:55 )   PT: 12.6 sec;   INR: 1.09 ratio         PTT - ( 23 Nov 2022 08:55 )  PTT:51.5 sec      IMAGING: Reviewed by me.     CT Head, CT perfusion  CT Angio Head/ Neck Stroke Protocol w/ IV Cont (11.23.22 @ 09:15)   *  NO EVIDENCE OF AN ACUTE INTRACRANIAL HEMORRHAGE, MIDLINE SHIFT, OR   HYDROCEPHALUS.  *  NO HEMODYNAMIC SIGNIFICANT NARROWING WITHIN THE NECK.  *  SEVERE FOCAL NARROWING VERSUS SMALL FOCAL OCCLUSION DISTAL M2 SEGMENT   POSTERIOR DIVISION LEFT MIDDLE CEREBRAL ARTERY.  *  AREA OF ISCHEMIA LEFT PARIETAL LOBE WITH A VOLUME OF 27 ML, CORE   INFARCT OF 11 ML, AND PENUMBRA OF 16 ML.     Preliminary note, offical recommendations pending attending review/signature   HPI:  61F Smoker / EtOH PMH Depression/Anxiety, COPD not on Home O2, CAD S/p Stent x 2 on ASA/Plavix P/W to ED as a Stroke Code. Acute onset of slurred speech, Rt sided weakness and numbness noted by Pt's friend while getting into a car around ~715AM after shopping at store. No similar events.In ED, NIHSS 13. CTA H/N / Brain and Perfusion + defect, M2 L MCA occlusion/Stenosis Given LWK < 3.5 hr, within window for TPA. Labs w/ WBC 11. SBP > 180, Cardene gtt started for BP control.   TPA given 0924 am (bolus), gtt initiated subsequently , not endovascular candidate due to no large vessel occlusion.   Admission NIHSS 13  Pre-MRS 0    SUBJECTIVE: No events overnight.  No new neurologic complaints.  ROS reported negative unless otherwise noted.    ARIPiprazole 2 milliGRAM(s) Oral at bedtime  atorvastatin 80 milliGRAM(s) Oral at bedtime  FLUoxetine 60 milliGRAM(s) Oral at bedtime  folic acid Injectable 1 milliGRAM(s) IV Push daily  hydrOXYzine hydrochloride 25 milliGRAM(s) Oral three times a day  magnesium sulfate  IVPB 1 Gram(s) IV Intermittent once  potassium chloride  10 mEq/100 mL IVPB 10 milliEquivalent(s) IV Intermittent every 1 hour  sodium chloride 0.9%. 1000 milliLiter(s) IV Continuous <Continuous>  thiamine Injectable 100 milliGRAM(s) IV Push daily      PHYSICAL EXAM:   Vital Signs Last 24 Hrs  T(C): 36.9 (24 Nov 2022 04:00), Max: 36.9 (24 Nov 2022 04:00)  T(F): 98.4 (24 Nov 2022 04:00), Max: 98.4 (24 Nov 2022 04:00)  HR: 59 (24 Nov 2022 06:00) (57 - 76)  BP: 146/98 (24 Nov 2022 06:00) (128/79 - 185/96)  BP(mean): 113 (24 Nov 2022 06:00) (92 - 130)  RR: 22 (24 Nov 2022 06:00) (15 - 26)  SpO2: 95% (24 Nov 2022 06:00) (92% - 99%)    Parameters below as of 24 Nov 2022 04:01  Patient On (Oxygen Delivery Method): room air    General: NAD     NEUROLOGICAL EXAM:  Mental status: Awake, alert, oriented to person place time, events, able to repeat , follow commands, IDs objects   Cranial Nerves: no facial palsy, EOMI, VF appear full,  no nystagmus,  no dysarthria,  tongue midline  Motor exam: Normal tone, no drift  5/5 RUE, 5/5 RLE, 5/5 LUE, 5/5 LLE  Sensation: no sensory neglect   Coordination/ Gait: no ataxia  gait not tested    LABS:                        11.9   9.97  )-----------( 312      ( 24 Nov 2022 05:15 )             36.2    11-24    134<L>  |  100  |  17.2  ----------------------------<  105<H>  3.4<L>   |  23.0  |  0.66    Ca    8.5      24 Nov 2022 05:15  Phos  4.4     11-24  Mg     1.8     11-24    TPro  7.0  /  Alb  4.2  /  TBili  0.4  /  DBili  x   /  AST  42<H>  /  ALT  28  /  AlkPhos  157<H>  11-23  PT/INR - ( 23 Nov 2022 08:55 )   PT: 12.6 sec;   INR: 1.09 ratio         PTT - ( 23 Nov 2022 08:55 )  PTT:51.5 sec      IMAGING: Reviewed by me.     CT Head No Cont (11.24.22 @ 09:02)   No acute intracranial hemorrhage, mass effect, or shift of   the midline structures.    Similar-appearing mild chronic white matter microvascular type changes.    CT Head, CT perfusion  CT Angio Head/ Neck Stroke Protocol w/ IV Cont (11.23.22 @ 09:15)   *  NO EVIDENCE OF AN ACUTE INTRACRANIAL HEMORRHAGE, MIDLINE SHIFT, OR   HYDROCEPHALUS.  *  NO HEMODYNAMIC SIGNIFICANT NARROWING WITHIN THE NECK.  *  SEVERE FOCAL NARROWING VERSUS SMALL FOCAL OCCLUSION DISTAL M2 SEGMENT   POSTERIOR DIVISION LEFT MIDDLE CEREBRAL ARTERY.  *  AREA OF ISCHEMIA LEFT PARIETAL LOBE WITH A VOLUME OF 27 ML, CORE   INFARCT OF 11 ML, AND PENUMBRA OF 16 ML.     HPI:  61F Smoker / EtOH PMH Depression/Anxiety, COPD not on Home O2, CAD S/p Stent x 2 on ASA/Plavix P/W to ED as a Stroke Code. Acute onset of slurred speech, Rt sided weakness and numbness noted by Pt's friend while getting into a car around ~715AM after shopping at store. No similar events.In ED, NIHSS 13. CTA H/N / Brain and Perfusion + defect, M2 L MCA occlusion/Stenosis Given LWK < 3.5 hr, within window for TPA. Labs w/ WBC 11. SBP > 180, Cardene gtt started for BP control.   TPA given 0924 am (bolus), gtt initiated subsequently , not endovascular candidate due to no large vessel occlusion.   Admission NIHSS 13  Pre-MRS 0    SUBJECTIVE: No events overnight.  No new neurologic complaints.  ROS reported negative unless otherwise noted.    ARIPiprazole 2 milliGRAM(s) Oral at bedtime  atorvastatin 80 milliGRAM(s) Oral at bedtime  FLUoxetine 60 milliGRAM(s) Oral at bedtime  folic acid Injectable 1 milliGRAM(s) IV Push daily  hydrOXYzine hydrochloride 25 milliGRAM(s) Oral three times a day  magnesium sulfate  IVPB 1 Gram(s) IV Intermittent once  potassium chloride  10 mEq/100 mL IVPB 10 milliEquivalent(s) IV Intermittent every 1 hour  sodium chloride 0.9%. 1000 milliLiter(s) IV Continuous <Continuous>  thiamine Injectable 100 milliGRAM(s) IV Push daily      PHYSICAL EXAM:   Vital Signs Last 24 Hrs  T(C): 36.9 (24 Nov 2022 04:00), Max: 36.9 (24 Nov 2022 04:00)  T(F): 98.4 (24 Nov 2022 04:00), Max: 98.4 (24 Nov 2022 04:00)  HR: 59 (24 Nov 2022 06:00) (57 - 76)  BP: 146/98 (24 Nov 2022 06:00) (128/79 - 185/96)  BP(mean): 113 (24 Nov 2022 06:00) (92 - 130)  RR: 22 (24 Nov 2022 06:00) (15 - 26)  SpO2: 95% (24 Nov 2022 06:00) (92% - 99%)    Parameters below as of 24 Nov 2022 04:01  Patient On (Oxygen Delivery Method): room air    General: NAD     NEUROLOGICAL EXAM:  Mental status: Awake, alert, oriented to person place time, events, able to repeat , follow commands, IDs objects   Cranial Nerves: no facial palsy, EOMI, VF appear full,  no nystagmus,  no dysarthria,  tongue midline  Motor exam: Normal tone, no drift  5/5 RUE, 5/5 RLE, 5/5 LUE, 5/5 LLE  Sensation: no sensory neglect   Coordination/ Gait: no ataxia  gait not tested  NIHSS 0      LABS:                        11.9   9.97  )-----------( 312      ( 24 Nov 2022 05:15 )             36.2    11-24    134<L>  |  100  |  17.2  ----------------------------<  105<H>  3.4<L>   |  23.0  |  0.66    Ca    8.5      24 Nov 2022 05:15  Phos  4.4     11-24  Mg     1.8     11-24    TPro  7.0  /  Alb  4.2  /  TBili  0.4  /  DBili  x   /  AST  42<H>  /  ALT  28  /  AlkPhos  157<H>  11-23  PT/INR - ( 23 Nov 2022 08:55 )   PT: 12.6 sec;   INR: 1.09 ratio         PTT - ( 23 Nov 2022 08:55 )  PTT:51.5 sec      IMAGING: Reviewed by me.     CT Head No Cont (11.24.22 @ 09:02)   No acute intracranial hemorrhage, mass effect, or shift of   the midline structures.    Similar-appearing mild chronic white matter microvascular type changes.    CT Head, CT perfusion  CT Angio Head/ Neck Stroke Protocol w/ IV Cont (11.23.22 @ 09:15)   *  NO EVIDENCE OF AN ACUTE INTRACRANIAL HEMORRHAGE, MIDLINE SHIFT, OR   HYDROCEPHALUS.  *  NO HEMODYNAMIC SIGNIFICANT NARROWING WITHIN THE NECK.  *  SEVERE FOCAL NARROWING VERSUS SMALL FOCAL OCCLUSION DISTAL M2 SEGMENT   POSTERIOR DIVISION LEFT MIDDLE CEREBRAL ARTERY.  *  AREA OF ISCHEMIA LEFT PARIETAL LOBE WITH A VOLUME OF 27 ML, CORE   INFARCT OF 11 ML, AND PENUMBRA OF 16 ML.

## 2022-11-25 ENCOUNTER — TRANSCRIPTION ENCOUNTER (OUTPATIENT)
Age: 57
End: 2022-11-25

## 2022-11-25 DIAGNOSIS — I10 ESSENTIAL (PRIMARY) HYPERTENSION: ICD-10-CM

## 2022-11-25 DIAGNOSIS — I42.9 CARDIOMYOPATHY, UNSPECIFIED: ICD-10-CM

## 2022-11-25 DIAGNOSIS — I63.9 CEREBRAL INFARCTION, UNSPECIFIED: ICD-10-CM

## 2022-11-25 LAB
ALBUMIN SERPL ELPH-MCNC: 4 G/DL — SIGNIFICANT CHANGE UP (ref 3.3–5.2)
ALP SERPL-CCNC: 141 U/L — HIGH (ref 40–120)
ALT FLD-CCNC: 20 U/L — SIGNIFICANT CHANGE UP
ANION GAP SERPL CALC-SCNC: 13 MMOL/L — SIGNIFICANT CHANGE UP (ref 5–17)
AST SERPL-CCNC: 22 U/L — SIGNIFICANT CHANGE UP
BILIRUB SERPL-MCNC: 0.5 MG/DL — SIGNIFICANT CHANGE UP (ref 0.4–2)
BUN SERPL-MCNC: 11.7 MG/DL — SIGNIFICANT CHANGE UP (ref 8–20)
CALCIUM SERPL-MCNC: 8.8 MG/DL — SIGNIFICANT CHANGE UP (ref 8.4–10.5)
CHLORIDE SERPL-SCNC: 105 MMOL/L — SIGNIFICANT CHANGE UP (ref 96–108)
CO2 SERPL-SCNC: 22 MMOL/L — SIGNIFICANT CHANGE UP (ref 22–29)
CREAT SERPL-MCNC: 0.57 MG/DL — SIGNIFICANT CHANGE UP (ref 0.5–1.3)
EGFR: 103 ML/MIN/1.73M2 — SIGNIFICANT CHANGE UP
GLUCOSE SERPL-MCNC: 118 MG/DL — HIGH (ref 70–99)
MAGNESIUM SERPL-MCNC: 2 MG/DL — SIGNIFICANT CHANGE UP (ref 1.6–2.6)
POTASSIUM SERPL-MCNC: 3.6 MMOL/L — SIGNIFICANT CHANGE UP (ref 3.5–5.3)
POTASSIUM SERPL-SCNC: 3.6 MMOL/L — SIGNIFICANT CHANGE UP (ref 3.5–5.3)
PROT SERPL-MCNC: 6.5 G/DL — LOW (ref 6.6–8.7)
SODIUM SERPL-SCNC: 140 MMOL/L — SIGNIFICANT CHANGE UP (ref 135–145)

## 2022-11-25 PROCEDURE — 99233 SBSQ HOSP IP/OBS HIGH 50: CPT

## 2022-11-25 PROCEDURE — 99221 1ST HOSP IP/OBS SF/LOW 40: CPT

## 2022-11-25 PROCEDURE — 70551 MRI BRAIN STEM W/O DYE: CPT | Mod: 26

## 2022-11-25 PROCEDURE — 71045 X-RAY EXAM CHEST 1 VIEW: CPT | Mod: 26

## 2022-11-25 PROCEDURE — 93010 ELECTROCARDIOGRAM REPORT: CPT

## 2022-11-25 PROCEDURE — 99223 1ST HOSP IP/OBS HIGH 75: CPT

## 2022-11-25 RX ORDER — HYDROXYZINE HCL 10 MG
50 TABLET ORAL THREE TIMES A DAY
Refills: 0 | Status: DISCONTINUED | OUTPATIENT
Start: 2022-11-25 | End: 2022-11-28

## 2022-11-25 RX ORDER — ARIPIPRAZOLE 15 MG/1
2 TABLET ORAL DAILY
Refills: 0 | Status: DISCONTINUED | OUTPATIENT
Start: 2022-11-25 | End: 2022-11-28

## 2022-11-25 RX ORDER — HYDRALAZINE HCL 50 MG
10 TABLET ORAL ONCE
Refills: 0 | Status: COMPLETED | OUTPATIENT
Start: 2022-11-25 | End: 2022-11-25

## 2022-11-25 RX ORDER — POTASSIUM CHLORIDE 20 MEQ
40 PACKET (EA) ORAL ONCE
Refills: 0 | Status: COMPLETED | OUTPATIENT
Start: 2022-11-25 | End: 2022-11-25

## 2022-11-25 RX ORDER — LISINOPRIL 2.5 MG/1
10 TABLET ORAL DAILY
Refills: 0 | Status: DISCONTINUED | OUTPATIENT
Start: 2022-11-25 | End: 2022-11-28

## 2022-11-25 RX ADMIN — Medication 25 MILLIGRAM(S): at 05:04

## 2022-11-25 RX ADMIN — Medication 10 MILLIGRAM(S): at 11:44

## 2022-11-25 RX ADMIN — ENOXAPARIN SODIUM 40 MILLIGRAM(S): 100 INJECTION SUBCUTANEOUS at 17:51

## 2022-11-25 RX ADMIN — ARIPIPRAZOLE 2 MILLIGRAM(S): 15 TABLET ORAL at 14:20

## 2022-11-25 RX ADMIN — Medication 25 MILLIGRAM(S): at 17:51

## 2022-11-25 RX ADMIN — ATORVASTATIN CALCIUM 80 MILLIGRAM(S): 80 TABLET, FILM COATED ORAL at 20:51

## 2022-11-25 RX ADMIN — Medication 100 MILLIGRAM(S): at 14:19

## 2022-11-25 RX ADMIN — Medication 1 TABLET(S): at 14:19

## 2022-11-25 RX ADMIN — Medication 81 MILLIGRAM(S): at 14:18

## 2022-11-25 RX ADMIN — Medication 50 MILLIGRAM(S): at 20:51

## 2022-11-25 RX ADMIN — Medication 40 MILLIEQUIVALENT(S): at 14:17

## 2022-11-25 RX ADMIN — Medication 1 MILLIGRAM(S): at 14:19

## 2022-11-25 RX ADMIN — CLOPIDOGREL BISULFATE 75 MILLIGRAM(S): 75 TABLET, FILM COATED ORAL at 14:20

## 2022-11-25 RX ADMIN — Medication 60 MILLIGRAM(S): at 20:51

## 2022-11-25 NOTE — CONSULT NOTE ADULT - NS ATTEND AMEND GEN_ALL_CORE FT
Pt was evaluated with PA, agree with PA's assessment and plan. Pt had NIHSS of 13 for expressive/receptive aphasia, right sided paresis, right sided hemianesthesia, and right sided hemineglect. Pt was within tPA window, all contraindications were reviewed with the pt's friend at bedside and pt's daughter over the phone. She met the criteria for tPA, tPA bolus went in at 924am followed by an infusion  - ICU admission  - SBP <180  - swallow eval  - PT/OT  - echo  - rehab eval  - smoking cessation counseling  - MRI brain when possible  will follow
seen with above,    61F (actual name Tacho Valle  63) history significant for chronic active smoker, COPD/lung nodule, anxiety, CAD with STEMI s/p stents to dRCA and staged LCx on 21, LV EF ~45%, remains on DAPT with clopidogrel, follow up in our practice last seen 3/2022 now admitted with acute CVA, MRI confirmed L-MCA infarct, TTE done with reports of segmental wall motion abnormality similar to prior  -Tele remains in sinus  -will plan for ABA on Monday to exclude CHRISTIANA thrombus, PFO and aortic arch atheroma, if no significant findings with cryptogenic CVA then ILR post ABA prior to discharge  -need smoking cessation start nicotine patch; LDL 78 continue statin and DAPT  -NPO after midnight on  and continue inpatient telemetry, please reach out to oncall cardiology service over the weekend if new cardiac issue arise.      Octaviano Diaz DO, [facility]  Faculty Non-Invasive Cardiologist  [phone]

## 2022-11-25 NOTE — BH CONSULTATION LIAISON ASSESSMENT NOTE - NSBHCHARTREVIEWLAB_PSY_A_CORE FT
CBC Full  -  ( 24 Nov 2022 05:15 )  WBC Count : 9.97 K/uL  RBC Count : 4.00 M/uL  Hemoglobin : 11.9 g/dL  Hematocrit : 36.2 %  Platelet Count - Automated : 312 K/uL  Mean Cell Volume : 90.5 fl  Mean Cell Hemoglobin : 29.8 pg  Mean Cell Hemoglobin Concentration : 32.9 gm/dL  Auto Neutrophil # : x  Auto Lymphocyte # : x  Auto Monocyte # : x  Auto Eosinophil # : x  Auto Basophil # : x  Auto Neutrophil % : x  Auto Lymphocyte % : x  Auto Monocyte % : x  Auto Eosinophil % : x  Auto Basophil % : x

## 2022-11-25 NOTE — DISCHARGE NOTE PROVIDER - HOSPITAL COURSE
61 year old female with history of Smoking, CAD s/p PCI, HTN, HLD, COPD, Depression, Anxiety and Alcoholism presented with right sided weakness and numbness. She was Code Stroke in ER. CT showed + defect, M2 L MCA occlusion/Stenosis. She was given tPA and was admitted to ICU. She was started on Cardene Infusion for BP management and it has been weaned off. Symptoms are improving. Repeat CT Head with no hemorrhage. Downgraded to Medicine. TTE with EF of 40-45% with wall motion abnormalities, cardiology consulted. MRI of the brain pending     61 year old female with history of Smoking, CAD s/p PCI, HTN, HLD, COPD, Depression, Anxiety and Alcoholism presented with right sided weakness and numbness. She was Code Stroke in ER. CT showed + defect, M2 L MCA occlusion/Stenosis. She was given tPA and was admitted to ICU. She was started on Cardene Infusion for BP management and it was weaned off. Symptoms are improving. Repeat CT Head with no hemorrhage. Downgraded to Medicine. TTE with EF of 40-45% with wall motion abnormalities, cardiology consulted. MRI of the brain showed acute posterior left MCA infarct.  Patient underwent ABA which was negative, and ILR placed.  Patient evaluated by PT and PM&R and recommended outpatient OT.  Patient stable for discharge to home today.    Vital Signs Last 24 Hrs  T(C): 36.4 (28 Nov 2022 11:16), Max: 36.9 (27 Nov 2022 17:33)  T(F): 97.5 (28 Nov 2022 11:16), Max: 98.4 (27 Nov 2022 17:33)  HR: 76 (28 Nov 2022 12:18) (67 - 82)  BP: 114/74 (28 Nov 2022 12:18) (96/69 - 132/88)  BP(mean): 91 (28 Nov 2022 12:18) (78 - 91)  RR: 17 (28 Nov 2022 12:18) (16 - 24)  SpO2: 94% (28 Nov 2022 12:18) (94% - 97%)    Parameters below as of 28 Nov 2022 12:18  Patient On (Oxygen Delivery Method): room air      PHYSICAL EXAM:  GENERAL: NAD  HEAD:  Atraumatic, Normocephalic  NECK: Supple, No JVD, Normal thyroid  NERVOUS SYSTEM:  Alert & Oriented X3, Good concentration; Motor Strength 5/5 B/L upper and lower extremities  CHEST/LUNG: Clear to auscultation bilaterally  HEART: Regular rate and rhythm; No murmurs, rubs, or gallops  ABDOMEN: Soft, Nontender, Nondistended; Bowel sounds present  EXTREMITIES:  2+ Peripheral Pulses, No clubbing, cyanosis, or edema         61 year old female with history of Smoking, CAD s/p PCI, HTN, HLD, COPD, Depression, Anxiety and Alcoholism presented with right sided weakness and numbness. She was Code Stroke in ER. CT showed + defect, M2 L MCA occlusion/Stenosis. She was given tPA and was admitted to ICU. She was started on Cardene Infusion for BP management and it was weaned off. Symptoms are improving. Repeat CT Head with no hemorrhage. Downgraded to Medicine. TTE with EF of 40-45% with wall motion abnormalities, cardiology consulted. MRI of the brain showed acute posterior left MCA infarct.  Patient underwent ABA which was negative, and ILR placed.  Patient evaluated by PT and PM&R and recommended outpatient OT.  Patient stable for discharge to home today.

## 2022-11-25 NOTE — DISCHARGE NOTE PROVIDER - ATTENDING DISCHARGE PHYSICAL EXAMINATION:
Vital Signs Last 24 Hrs  T(C): 36.6 (25 Nov 2022 04:53), Max: 36.7 (24 Nov 2022 12:00)  T(F): 97.9 (25 Nov 2022 04:53), Max: 98.1 (24 Nov 2022 16:54)  HR: 59 (25 Nov 2022 04:53) (57 - 68)  BP: 150/90 (25 Nov 2022 06:46) (145/92 - 181/95)  BP(mean): 109 (24 Nov 2022 12:00) (109 - 109)  RR: 17 (25 Nov 2022 04:53) (12 - 18)  SpO2: 95% (25 Nov 2022 04:53) (94% - 97%)    Parameters below as of 25 Nov 2022 04:53  Patient On (Oxygen Delivery Method): room air        PHYSICAL EXAM:    GENERAL: NAD, AOX3  HEAD:  Atraumatic, Normocephalic  EYES:conjunctiva and sclera clear  ENMT: Moist mucous membranes  NECK: Supple, No JVD  NERVOUS SYSTEM:  Alert & Oriented X3, Motor Strength 5/5 B/L upper and lower extremities  CHEST/LUNG: Clear to auscultation bilaterally; No rales, rhonchi, wheezing, or rubs  HEART: Regular rate and rhythm; No murmurs, rubs, or gallops  ABDOMEN: Soft, Nontender, Nondistended; Bowel sounds present  EXTREMITIES:  2+ Peripheral Pulses, No clubbing, cyanosis, or edema       PHYSICAL EXAM:    GENERAL: NAD, AOX3  HEAD:  Atraumatic, Normocephalic  EYES:conjunctiva and sclera clear  ENMT: Moist mucous membranes  NECK: Supple, No JVD  NERVOUS SYSTEM:  Alert & Oriented X3, Motor Strength 5/5 B/L upper and lower extremities  CHEST/LUNG: Clear to auscultation bilaterally; No rales, rhonchi, wheezing, or rubs  HEART: Regular rate and rhythm; No murmurs, rubs, or gallops  ABDOMEN: Soft, Nontender, Nondistended; Bowel sounds present  EXTREMITIES:  2+ Peripheral Pulses, No clubbing, cyanosis, or edema

## 2022-11-25 NOTE — BH CONSULTATION LIAISON ASSESSMENT NOTE - NSBHSACONSEQUENCE_PSY_A_CORE FT
Denies any past consequences associated with substance use. Was previously pursuing treatment through [facility] for substance use but was discharged due to not complying with meetings and therapeutic modalities of treatment

## 2022-11-25 NOTE — BH CONSULTATION LIAISON ASSESSMENT NOTE - NSBHSAOPI_PSY_A_CORE FT
denied during interview, chart review of patient shows previous reported history, treated with Suboxone previously

## 2022-11-25 NOTE — BH CONSULTATION LIAISON ASSESSMENT NOTE - NSBHCONSULTRECOMMENDOTHER_PSY_A_CORE FT
-Continue Prozac 60 mg qD  -Change timing of Abilify 2 mg to morning (currently at bedtime)  -Increase Vistaril to 50 mg TID PRN for anxiety  -CIWA monitoring  -Outpatient follow up through [facility]

## 2022-11-25 NOTE — DISCHARGE NOTE PROVIDER - NSDCCPTREATMENT_GEN_ALL_CORE_FT
PRINCIPAL PROCEDURE  Procedure: Insertion, loop recorder  Findings and Treatment: Loop Recorder Incision Care:     - Do not touch the incision until it is completely healed.   - There is Dermabond (skin glue) and/or Steristrips on your incision, which will start to flake off on its own over the next 2-3 weeks. Do not pick at or peel off the Dermabond and /or Steristrips.    - Do not apply soaps, creams, lotions, ointments or powders to the incision until it is completely healed.  - You should call the doctor if you notice redness, drainage, swelling, increased tenderness, hot sensation around the  incision, bleeding or incision edges pulling apart.

## 2022-11-25 NOTE — BH CONSULTATION LIAISON ASSESSMENT NOTE - CURRENT MEDICATION
MEDICATIONS  (STANDING):  ARIPiprazole 2 milliGRAM(s) Oral daily  aspirin  chewable 81 milliGRAM(s) Oral daily  atorvastatin 80 milliGRAM(s) Oral at bedtime  clopidogrel Tablet 75 milliGRAM(s) Oral daily  enoxaparin Injectable 40 milliGRAM(s) SubCutaneous <User Schedule>  FLUoxetine 60 milliGRAM(s) Oral at bedtime  folic acid 1 milliGRAM(s) Oral daily  influenza   Vaccine 0.5 milliLiter(s) IntraMuscular once  lisinopril 10 milliGRAM(s) Oral daily  metoprolol tartrate 25 milliGRAM(s) Oral two times a day  multivitamin 1 Tablet(s) Oral daily  polyethylene glycol 3350 17 Gram(s) Oral daily  senna 2 Tablet(s) Oral at bedtime  thiamine 100 milliGRAM(s) Oral daily    MEDICATIONS  (PRN):  acetaminophen     Tablet .. 650 milliGRAM(s) Oral every 6 hours PRN Temp greater or equal to 38C (100.4F), Mild Pain (1 - 3), Moderate Pain (4 - 6)  hydrOXYzine hydrochloride 50 milliGRAM(s) Oral three times a day PRN anixety  LORazepam   Injectable 2 milliGRAM(s) IV Push every 1 hour PRN Symptom-triggered: each CIWA -Ar score 8 or GREATER  ondansetron Injectable 4 milliGRAM(s) IV Push every 6 hours PRN Nausea and/or Vomiting

## 2022-11-25 NOTE — PROGRESS NOTE ADULT - ASSESSMENT
ASSESSMENT: 61F Smoker / EtOH PMH Depression/Anxiety, COPD not on Home O2, CAD S/p Stent x 2 on ASA/Plavix P/W to ED as a Stroke Code. Presented with last known normal 0700am which shortly after was noted with right hemiparesis, aphasia, and slurred speech.   , NIHSS 13. No absolute contraindications upon review.  TPA given 0924 am 11/23/22  (bolus), gtt initiated subsequently after SBP was lowered, not endovascular candidate due to no large vessel occlusion. CTH demonstrated no acute hemorrhage or acute infarction, perfusion with ischemic area in left parietal lobe, CTA with no proximal large vessel occlusion, there is a note of severe focal narrowing vs. occlusion of the distal M2 segment in the left MCA. Etiology, embolic stroke of undetermined source.      NEURO: neurologically overall appears back to baseline, Continue close monitoring for neurologic deterioration, permissive HTN < 180/105mmHg- gradual titration eventually pending , titrate statin to LDL goal less than 70, MRI Brain w/o once stable,   Physical therapy/OT/Speech eval/treatment.     ANTITHROMBOTIC THERAPY:  on DAPT as per home regimen,  no acute hemorrhage or large infarct identified on repeat CT head, initiated after 24 hour protocol complete.     PULMONARY:   protecting airway, saturating well     CARDIOVASCULAR:  TTE as noted above, cardiac monitoring to screen for atrial fibrillation with ILR , cardiac follow up                                SBP goal: permissive htn < 180/105mmHg     GASTROINTESTINAL:  dysphagia screen  passed      Diet: NPO per protocol post TPA    RENAL: BUN/Cr within range, good urine output , hydration as tolerated, hyponatremia and hypokalemia resolved     Na Goal: Greater than 135    HEMATOLOGY: H/H without anemia, no active bleeding , Platelets 312     DVT ppx: SCD- pharmacological ppx post TPA 24 hour protocol      ID: afebrile,  leukocytosis resolved- possibly reactive, screen for infection     OTHER:   patient extensively counselled on nicotine cessation, she drinks 3 alcoholic beverages daily - monitor for withdrawal. Adverse risks discussed, defers nicotine patch. Admits to depression-  to consult. [facility] protocol, etoh consumption >3 alcoholic beverages per day- last drink morning prior to admission.     DISPOSITION: Rehab or home depending on PT eval once stable and workup is complete      CORE MEASURES:        Admission NIHSS: 13     TPA: [x] YES [] NO      LDL/HDL/A1C: 78/45/5.7     Depression Screen: 1     Statin Therapy: as noted      Dysphagia Screen: [x] PASS [] FAIL     Smoking [x] YES [] NO - counselling on cessation      Afib [] YES [x] NO     Stroke Education [x] YES [] NO    ASSESSMENT: 61F Smoker / EtOH PMH Depression/Anxiety, COPD not on Home O2, CAD S/p Stent x 2 on ASA/Plavix P/W to ED as a Stroke Code. Presented with last known normal 0700am which shortly after was noted with right hemiparesis, aphasia, and slurred speech.   , NIHSS 13. No absolute contraindications upon review.  TPA given 0924 am 11/23/22  (bolus), gtt initiated subsequently after SBP was lowered, not endovascular candidate due to no large vessel occlusion. CTH demonstrated no acute hemorrhage or acute infarction, perfusion with ischemic area in left parietal lobe, CTA with no proximal large vessel occlusion, there is a note of severe focal narrowing vs. occlusion of the distal M2 segment in the left MCA. Etiology, left MCA cerebral infarction, embolic stroke of undetermined source, she has concern for focal narrowing in the left MCA M2 segment distally , possibly intracranial atherosclerotic disease but cardioembolic and hypercoagulable states should be screened for.      NEURO: neurologically overall appears back to baseline, Continue close monitoring for neurologic deterioration, permissive HTN < 180/105mmHg- gradual titration eventually pending , titrate statin to LDL goal less than 70, MRI Brain w/o once stable,   Physical therapy/OT/Speech eval/treatment.     ANTITHROMBOTIC THERAPY:  ASA 81mg daily, Clopidogrel 75mg daily, check P2Y12     PULMONARY:   protecting airway, saturating well     CARDIOVASCULAR:  TTE as noted above, cardiac monitoring to screen for atrial fibrillation with ILR , cardiac follow up- ABA pending                                 SBP goal: permissive htn < 180/105mmHg overall and very gradual titration to normotension avoiding rapid flucutations and hypotension     GASTROINTESTINAL:  dysphagia screen  passed      Diet: NPO per protocol post TPA    RENAL: BUN/Cr within range, good urine output , hydration as tolerated, hyponatremia and hypokalemia resolved     Na Goal: Greater than 135    HEMATOLOGY: H/H without anemia, no active bleeding , Platelets 312, age and risk appropriate malignancy screenings      DVT ppx: SCD- pharmacological ppx post TPA 24 hour protocol      ID: afebrile,  leukocytosis resolved- possibly reactive, screen for infection     OTHER:   patient extensively counselled on nicotine cessation, she drinks 3 alcoholic beverages daily - monitor for withdrawal. Adverse risks discussed, defers nicotine patch. Admits to depression-   consult. [facility] protocol, etoh consumption >3 alcoholic beverages per day- last drink morning prior to admission.     DISPOSITION: Rehab or home depending on PT eval once stable and workup is complete      CORE MEASURES:        Admission NIHSS: 13     TPA: [x] YES [] NO      LDL/HDL/A1C: 78/45/5.7     Depression Screen: 1     Statin Therapy: as noted      Dysphagia Screen: [x] PASS [] FAIL     Smoking [x] YES [] NO - counselling on cessation      Afib [] YES [x] NO     Stroke Education [x] YES [] NO    ASSESSMENT: 61F Smoker / EtOH PMH Depression/Anxiety, COPD not on Home O2, CAD S/p Stent x 2 on ASA/Plavix P/W to ED as a Stroke Code. Presented with last known normal 0700am which shortly after was noted with right hemiparesis, aphasia, and slurred speech.   , NIHSS 13. No absolute contraindications upon review.  TPA given 0924 am 11/23/22  (bolus), gtt initiated subsequently after SBP was lowered, not endovascular candidate due to no large vessel occlusion. CTH demonstrated no acute hemorrhage or acute infarction, perfusion with ischemic area in left parietal lobe, CTA with no proximal large vessel occlusion, there is a note of severe focal narrowing vs. occlusion of the distal M2 segment in the left MCA. Etiology, left MCA cerebral infarction, embolic stroke of undetermined source, she has concern for focal narrowing in the left MCA M2 segment distally , possibly intracranial atherosclerotic disease but cardioembolic and hypercoagulable states should be screened for.      NEURO: neurologically overall appears back to baseline, Continue close monitoring for neurologic deterioration, normotension 140/95mmHg , titrate statin to LDL goal less than 70,  - MRI Brain w/o   - Physical therapy/OT/Speech eval/treatment.     ANTITHROMBOTIC THERAPY:  ASA 81mg daily, Clopidogrel 75mg daily, check P2Y12     CARDIOVASCULAR:  TTE as noted above, cardiac monitoring to screen for atrial fibrillation with ILR , cardiac follow up- ABA pending                                 SBP goal: permissive htn < 180/105mmHg overall and very gradual titration to normotension avoiding rapid flucutations and hypotension     GASTROINTESTINAL:  dysphagia screen  passed      Diet: NPO per protocol post TPA    RENAL: BUN/Cr within range, good urine output , hydration as tolerated, hyponatremia and hypokalemia resolved     Na Goal: Greater than 135    HEMATOLOGY: H/H without anemia, no active bleeding , Platelets 312, age and risk appropriate malignancy screenings      DVT ppx: SCD- pharmacological ppx post TPA 24 hour protocol      ID: afebrile,  leukocytosis resolved- possibly reactive, screen for infection     OTHER:   patient extensively counselled on nicotine cessation, she drinks 3 alcoholic beverages daily - monitor for withdrawal. Adverse risks discussed, defers nicotine patch. Admits to depression-   consult. [facility] protocol, etoh consumption >3 alcoholic beverages per day- last drink morning prior to admission.     DISPOSITION: Rehab or home depending on PT eval once stable and workup is complete      CORE MEASURES:        Admission NIHSS: 13     TPA: [x] YES [] NO      LDL/HDL/A1C: 78/45/5.7     Depression Screen: 1     Statin Therapy: as noted      Dysphagia Screen: [x] PASS [] FAIL     Smoking [x] YES [] NO - counselling on cessation      Afib [] YES [x] NO     Stroke Education [x] YES [] NO

## 2022-11-25 NOTE — DISCHARGE NOTE PROVIDER - CARE PROVIDER_API CALL
Savita Alcala (NP; RN)  NP in Adult Health  [address] [place] [address]  Phone: [phone]  Fax: [phone]  Follow Up Time: 1 week   Savita Alcala (NP; RN)  NP in Adult Health  [address] [place] [address]  Phone: [phone]  Fax: [phone]  Follow Up Time: 1 week    Hubert Terrazas)  Cardiac Electrophysiology; Cardiovascular Disease; Internal Medicine  [address] [facility] [address]  [place] [address]  Phone: [phone]  Fax: [phone]  Follow Up Time:    Savita Alcala (NP; RN)  NP in Adult Health  [address] [place] [address]  Phone: [phone]  Fax: [phone]  Follow Up Time: 1 week    Hubert Terrazas (MD)  Cardiac Electrophysiology; Cardiovascular Disease; Internal Medicine  [address] [facility], [address]  [place] [address]  Phone: [phone]  Fax: [phone]  Follow Up Time:     Antony Pena; PhD)  Neurology; Vascular Neurology  [address] [facility] [facility] [address]  [place] [address]  Phone: [phone]  Fax: [phone]  Follow Up Time:

## 2022-11-25 NOTE — DISCHARGE NOTE PROVIDER - PROVIDER TOKENS
PROVIDER:[TOKEN:[[id number]],FOLLOWUP:[1 week]] PROVIDER:[TOKEN:[[id number]],FOLLOWUP:[1 week]],PROVIDER:[TOKEN:[[id number]]] PROVIDER:[TOKEN:[[id number]],FOLLOWUP:[1 week]],PROVIDER:[TOKEN:[[id number]]],PROVIDER:[TOKEN:[[id number]]]

## 2022-11-25 NOTE — CONSULT NOTE ADULT - ASSESSMENT
60 y/o female smoker with alchoholism (at least bottle of wine per day) , HTN Depression, Copd s/p Stemi  s/p iwmi and stent to RCA and circ on plavix and asa who presented with  a Stroke Code. Acute onset of slurred speech, Rt sided weakness and numbness noted by Pt's friend while getting into a car around 730AM after shopping at store. No HA or dizziness. No SOB or fevers  Symptoms have totally improved.   In ED, NIHSS 13. CTA H/N / Brain and Perfusion + defect, M2 L MCA occlusion/Stenosis Given LWK < 3.5 hr, within window for TPA. TPa given -> Labs w/ WBC 11. SBP > 180, Cardene gtt started for BP control.          60 y/o female smoker with alchoholism (at least bottle of wine per day) , HTN Depression, Copd s/p Stemi  s/p iwmi and stent to RCA and circ on plavix and asa and mildly reduced EF 45%  who presented with  a Stroke Code. Acute onset of slurred speech, Rt sided weakness and numbness noted by Pt's friend while getting into a car around 730AM after shopping at store. No HA or dizziness. No SOB or fevers  Symptoms have totally improved.   In ED, NIHSS 13. CTA H/N / Brain and Perfusion + defect, M2 L MCA occlusion/Stenosis Given LWK < 3.5 hr, within window for TPA. TPa given -> Labs w/ WBC 11. SBP > 180, Cardene gtt started for BP control.

## 2022-11-25 NOTE — CONSULT NOTE ADULT - PROBLEM SELECTOR RECOMMENDATION 9
on asa and plavix  continue for now  continue telemetry monitoring to screen for atrial fib  r/o embolic event  will plan for ABA and Loop monitor on monday  Discussed with patient  Smoking cessation strongly advised

## 2022-11-25 NOTE — CONSULT NOTE ADULT - SUBJECTIVE AND OBJECTIVE BOX
[facility]                                              CARDIOLOGY AT [facility]                                                   [address], [place] [address]                                             Telephone: [phone]. Fax:[phone]                                                         CARDIOLOGY CONSULTATION NOTE                                                                                             Consult requested by:    History obtained by: Patient and medical record  Community Cardiologist:    obtained: Yes [  ] No [  ]  Reason for Consultation:   Avialable out pt records reviewed: Yes [  ] No [  ]    Chief complaint:    Patient is a 61y old  Female who presents with a chief complaint of Slurred speech and Rt sided Numbness/weakness (25 Nov 2022 11:44)    This is a 60 y/o female smoker with hx etoh use, HTN Depression, Copd s/p Stemi 11/22 s/p iwmi and stent to Rca and circ on plavix and asa who presented with  a Stroke Code. Acute onset of slurred speech, Rt sided weakness and numbness noted by Pt's friend while getting into a car around 730AM after shopping at store. No HA or dizziness. No SOB or fevers  In ED, NIHSS 13. CTA H/N / Brain and Perfusion + defect, M2 L MCA occlusion/Stenosis Given LWK < 3.5 hr, within window for TPA. Labs w/ WBC 11. SBP > 180, Cardene gtt started for BP control.    (23 Nov 2022 11:31)        CARDIAC TESTING   ECHO:< from: TTE Echo Complete w/ Contrast w/ Doppler (11.23.22 @ 18:54) >  Summary:   1. Left ventricular ejection fraction, by visual estimation, is 40 to   45%.   2. Basal and mid inferior wall and basal and mid inferolateral wall are   abnormal as described above.   3. Spectral Doppler shows impaired relaxation pattern of left   ventricular myocardial filling (Grade I diastolic dysfunction).   4. There is mild left ventricular hypertrophy.   5. Mildly enlarged right atrium.   6. There is no evidence of pericardial effusion.   7. Mild mitral annular calcification.   8. Mild mitral valve regurgitation.   9. Mild thickening of the anterior and posterior mitral valve leaflets.  10. Mild tricuspid regurgitation.  11. Insufficient TR to accurately assess RVSP.  12. Sclerotic aortic valve with normal opening.    CATH report pending s/p stents to RCA and circ    PAST MEDICAL HISTORY  Hypertension  Ashd Iwmi (stemi) Stents to RCA & Circ  Depression        PAST SURGICAL HISTORY  H/O clavicle fracture          SOCIAL HISTORY:  Denies smoking/alcohol/drugs      Family History of Cardiovascular Disease:  Yes [  x] No [  ]  Coronary Artery Disease in first degree relative: Yes [x  ] No [  ]  Sudden Cardiac Death in First degree relative: Yes [  ] No [ x ]      HOME MEDICATIONS:  ASA 81mg qd  Plavix 74mg qd  Lisinopril 10mg qd  Metoprolol ER 50 mg qd      CURRENT MEDICATIONS:  lisinopril 10 milliGRAM(s) Oral daily  metoprolol tartrate 25 milliGRAM(s) Oral two times a day  ARIPiprazole  FLUoxetine  polyethylene glycol 3350  senna  aspirin  chewable  atorvastatin  clopidogrel Tablet  enoxaparin Injectable  folic acid  influenza   Vaccine  multivitamin  thiamine        ALLERGIES: Allergies    No Known Allergies    Intolerances          REVIEW OF SYMPTOMS:   CONSTITUTIONAL: No fever, no chills, no weight loss, no weight gain, no fatigue   ENMT:  No vertigo; No sinus or throat pain  NECK: No pain or stiffness  CARDIOVASCULAR: No chest pain, no dyspnea, no syncope/presyncope, no palpitations, no dizziness, no Orthopnea, no Paroxsymal nocturnal dyspnea  RESPIRATORY: no Shortness of breath, no cough, no wheezing  : No dysuria, no hematuria   GI: No dark color stool, no nausea, no diarrhea, no constipation, no abdominal pain   NEURO: No headache, no slurred speech   MUSCULOSKELETAL: No joint pain or swelling; No muscle, back, or extremity pain  PSYCH: No agitation, no anxiety.    ALL OTHER REVIEW OF SYSTEMS ARE NEGATIVE.      Vital Signs Last 24 Hrs  T(C): 36.4 (25 Nov 2022 11:46), Max: 36.7 (24 Nov 2022 16:54)  T(F): 97.6 (25 Nov 2022 11:46), Max: 98.1 (24 Nov 2022 16:54)  HR: 61 (25 Nov 2022 11:46) (57 - 64)  BP: 173/112 (25 Nov 2022 11:46) (145/92 - 181/95)  BP(mean): --  RR: 18 (25 Nov 2022 11:46) (16 - 18)  SpO2: 94% (25 Nov 2022 11:46) (94% - 97%)    Parameters below as of 25 Nov 2022 11:46  Patient On (Oxygen Delivery Method): room air      INTAKE AND OUTPUT:   11-24 @ 07:01  -  11-25 @ 07:00  --------------------------------------------------------  IN: 580 mL / OUT: 400 mL / NET: 180 mL        PHYSICAL EXAM:  Constitutional: Comfortable . No acute distress.   HEENT: Atraumatic and normocephalic , neck is supple . no JVD. No carotid bruit.  CNS: A&Ox3. No focal deficits.   Respiratory: CTAB, unlabored   Cardiovascular: RRR normal s1 s2. No murmur. No rubs or gallop.  Gastrointestinal: Soft, non-tender. +Bowel sounds.   MSK: full ROM extremities x 4  Extremities: No edema. No cyanosis   Psychiatric: Calm . no agitation.   Skin: Warm and dry, no ulcers on extremities       LABS:                        11.9   9.97  )-----------( 312      ( 24 Nov 2022 05:15 )             36.2     11-25    140  |  105  |  11.7  ----------------------------<  118<H>  3.6   |  22.0  |  0.57    Ca    8.8      25 Nov 2022 04:45  Phos  4.4     11-24  Mg     2.0     11-25    TPro  6.5<L>  /  Alb  4.0  /  TBili  0.5  /  DBili  x   /  AST  22  /  ALT  20  /  AlkPhos  141<H>  11-25      ;p-BNP=          INTERPRETATION OF TELEMETRY:     ECG:   Prior ECG: Yes [  ] No [  ]      RADIOLOGY & ADDITIONAL STUDIES:    X-ray:  reviewed by me.   CT scan:   MRI:   US:                                                [facility]                                              CARDIOLOGY AT [facility]                                                   [address], [place] [address]                                             Telephone: [phone]. Fax:[phone]                                                         CARDIOLOGY CONSULTATION NOTE                                                                                             History obtained by: Patient and medical record  Community Cardiologist:    obtained: Yes [  ] No [  ]  Reason for Consultation: CVA  Avialable out pt records reviewed: Yes [ s ] No [  ]    Real name Tacho Valle  [birth year]      This is a 60 y/o female smoker with alchoholism (at least bottle of wine per day) , HTN Depression, Copd s/p Stemi  s/p iwmi and stent to RCA and circ on plavix and asa who presented with  a Stroke Code. Acute onset of slurred speech, Rt sided weakness and numbness noted by Pt's friend while getting into a car around 730AM after shopping at store. No HA or dizziness. No SOB or fevers  Symptoms have totally improved.   In ED, NIHSS 13. CTA H/N / Brain and Perfusion + defect, M2 L MCA occlusion/Stenosis Given LWK < 3.5 hr, within window for TPA. TPa given -> Labs w/ WBC 11. SBP > 180, Cardene gtt started for BP control.        CARDIAC TESTING   ECHO:< from: TTE Echo Complete w/ Contrast w/ Doppler (11.23.22 @ 18:54) >  Summary:   1. Left ventricular ejection fraction, by visual estimation, is 40 to   45%.   2. Basal and mid inferior wall and basal and mid inferolateral wall are   abnormal as described above.   3. Spectral Doppler shows impaired relaxation pattern of left   ventricular myocardial filling (Grade I diastolic dysfunction).   4. There is mild left ventricular hypertrophy.   5. Mildly enlarged right atrium.   6. There is no evidence of pericardial effusion.   7. Mild mitral annular calcification.   8. Mild mitral valve regurgitation.   9. Mild thickening of the anterior and posterior mitral valve leaflets.  10. Mild tricuspid regurgitation.  11. Insufficient TR to accurately assess RVSP.  12. Sclerotic aortic valve with normal opening.    CATH report pending s/p stents to RCA and circ    PAST MEDICAL HISTORY  Hypertension  Ashd Iwmi (stemi) Stents to RCA & Circ  Depression    PAST SURGICAL HISTORY  H/O clavicle fracture    SOCIAL HISTORY:    smokes 1 ppd and drinks at least one bottle of wine per day      Family History of Cardiovascular Disease:  Yes [  x] No [  ]  Coronary Artery Disease in first degree relative: Yes [x  ] No [  ]  Sudden Cardiac Death in First degree relative: Yes [  ] No [ x ]      HOME MEDICATIONS:  ASA 81mg qd  Plavix 74mg qd  Lisinopril 10mg qd  Metoprolol ER 50 mg qd    CURRENT MEDICATIONS:  lisinopril 10 milliGRAM(s) Oral daily  metoprolol tartrate 25 milliGRAM(s) Oral two times a day  ARIPiprazole  FLUoxetine  polyethylene glycol 3350  senna  aspirin  chewable  atorvastatin  clopidogrel Tablet  enoxaparin Injectable  folic acid  influenza   Vaccine  multivitamin  thiamine    ALLERGIES: NKDAA    REVIEW OF SYMPTOMS:   CONSTITUTIONAL: No fever, no chills, no weight loss, no weight gain, no fatigue   ENMT:  No vertigo; No sinus or throat pain  NECK: No pain or stiffness  CARDIOVASCULAR: No chest pain, no dyspnea, no syncope/presyncope, no palpitations, no dizziness, no Orthopnea, no Paroxsymal nocturnal dyspnea  RESPIRATORY: no Shortness of breath, no cough, no wheezing  : No dysuria, no hematuria   GI: No dark color stool, no nausea, no diarrhea, no constipation, no abdominal pain   NEURO: No headache, no slurred speech   MUSCULOSKELETAL: No joint pain or swelling; No muscle, back, or extremity pain  PSYCH: No agitation, no anxiety.    ALL OTHER REVIEW OF SYSTEMS ARE NEGATIVE.      Vital Signs Last 24 Hrs  T(C): 36.4 (25 Nov 2022 11:46), Max: 36.7 (24 Nov 2022 16:54)  T(F): 97.6 (25 Nov 2022 11:46), Max: 98.1 (24 Nov 2022 16:54)  HR: 61 (25 Nov 2022 11:46) (57 - 64)  BP: 173/112 (25 Nov 2022 11:46) (145/92 - 181/95)  BP(mean): --  RR: 18 (25 Nov 2022 11:46) (16 - 18)  SpO2: 94% (25 Nov 2022 11:46) (94% - 97%)    Parameters below as of 25 Nov 2022 11:46  Patient On (Oxygen Delivery Method): room air      INTAKE AND OUTPUT:   11-24 @ 07:01  -  11-25 @ 07:00  --------------------------------------------------------  IN: 580 mL / OUT: 400 mL / NET: 180 mL    PHYSICAL EXAM:  Constitutional: Comfortable . No acute distress.   HEENT: Atraumatic and normocephalic , neck is supple . no JVD. No carotid bruit.  CNS: A&Ox3. No focal deficits. No tremor  Respiratory: CTAB, unlabored   Cardiovascular: RRR normal s1 s2. No murmur. No rubs or gallop.  Gastrointestinal: Soft, non-tender. +Bowel sounds.   MSK: full ROM extremities x 4  Extremities: No edema. No cyanosis   Psychiatric: Calm . no agitation.   Skin: Warm and dry, no ulcers on extremities       LABS:                        11.9   9.97  )-----------( 312      ( 24 Nov 2022 05:15 )             36.2     11-25    140  |  105  |  11.7  ----------------------------<  118<H>  3.6   |  22.0  |  0.57    Ca    8.8      25 Nov 2022 04:45  Phos  4.4     11-24  Mg     2.0     11-25    TPro  6.5<L>  /  Alb  4.0  /  TBili  0.5  /  DBili  x   /  AST  22  /  ALT  20  /  AlkPhos  141<H>  11-25      INTERPRETATION OF TELEMETRY: NSR  no atrial fib    ECG: NSR prwp cannot r/o awmi non specific st changes    RADIOLOGY & ADDITIONAL STUDIES:    X-ray:  pending  CT scan:   MRI:   US:                                                [facility]                                              CARDIOLOGY AT [facility]                                                   [address], [place] [address]                                             Telephone: [phone]. Fax:[phone]                                                         CARDIOLOGY CONSULTATION NOTE                                                                                             History obtained by: Patient and medical record  Community Cardiologist:    obtained: Yes [  ] No [  ]  Reason for Consultation: CVA  Avialable out pt records reviewed: Yes [ s ] No [  ]    Real name Tacho Valle  [birth year]      This is a 62 y/o female smoker with alchoholism (at least bottle of wine per day) , HTN Depression, Copd s/p Stemi  s/p iwmi and stent to RCA and circ on plavix and asa  and midly reduced EF 45%  who presented with  a Stroke Code. Acute onset of slurred speech, Rt sided weakness and numbness noted by Pt's friend while getting into a car around 730AM after shopping at store. No HA or dizziness. No SOB or fevers  Symptoms have totally improved.   In ED, NIHSS 13. CTA H/N / Brain and Perfusion + defect, M2 L MCA occlusion/Stenosis Given LWK < 3.5 hr, within window for TPA. TPa given -> Labs w/ WBC 11. SBP > 180, Cardene gtt started for BP control.        CARDIAC TESTING   ECHO:< from: TTE Echo Complete w/ Contrast w/ Doppler (11.23.22 @ 18:54) >  Summary:   1. Left ventricular ejection fraction, by visual estimation, is 40 to   45%.   2. Basal and mid inferior wall and basal and mid inferolateral wall are   abnormal as described above.   3. Spectral Doppler shows impaired relaxation pattern of left   ventricular myocardial filling (Grade I diastolic dysfunction).   4. There is mild left ventricular hypertrophy.   5. Mildly enlarged right atrium.   6. There is no evidence of pericardial effusion.   7. Mild mitral annular calcification.   8. Mild mitral valve regurgitation.   9. Mild thickening of the anterior and posterior mitral valve leaflets.  10. Mild tricuspid regurgitation.  11. Insufficient TR to accurately assess RVSP.  12. Sclerotic aortic valve with normal opening.    CATH report pending s/p stents to RCA and circ    PAST MEDICAL HISTORY  Hypertension  Ashd Iwmi (stemi) Stents to RCA & Circ  Depression    PAST SURGICAL HISTORY  H/O clavicle fracture    SOCIAL HISTORY:    smokes 1 ppd and drinks at least one bottle of wine per day      Family History of Cardiovascular Disease:  Yes [  x] No [  ]  Coronary Artery Disease in first degree relative: Yes [x  ] No [  ]  Sudden Cardiac Death in First degree relative: Yes [  ] No [ x ]      HOME MEDICATIONS:  ASA 81mg qd  Plavix 74mg qd  Lisinopril 10mg qd  Metoprolol ER 50 mg qd    CURRENT MEDICATIONS:  lisinopril 10 milliGRAM(s) Oral daily  metoprolol tartrate 25 milliGRAM(s) Oral two times a day  ARIPiprazole  FLUoxetine  polyethylene glycol 3350  senna  aspirin  chewable  atorvastatin  clopidogrel Tablet  enoxaparin Injectable  folic acid  influenza   Vaccine  multivitamin  thiamine    ALLERGIES: NKDAA    REVIEW OF SYMPTOMS:   CONSTITUTIONAL: No fever, no chills, no weight loss, no weight gain, no fatigue   ENMT:  No vertigo; No sinus or throat pain  NECK: No pain or stiffness  CARDIOVASCULAR: No chest pain, no dyspnea, no syncope/presyncope, no palpitations, no dizziness, no Orthopnea, no Paroxsymal nocturnal dyspnea  RESPIRATORY: no Shortness of breath, no cough, no wheezing  : No dysuria, no hematuria   GI: No dark color stool, no nausea, no diarrhea, no constipation, no abdominal pain   NEURO: No headache, no slurred speech   MUSCULOSKELETAL: No joint pain or swelling; No muscle, back, or extremity pain  PSYCH: No agitation, no anxiety.    ALL OTHER REVIEW OF SYSTEMS ARE NEGATIVE.      Vital Signs Last 24 Hrs  T(C): 36.4 (25 Nov 2022 11:46), Max: 36.7 (24 Nov 2022 16:54)  T(F): 97.6 (25 Nov 2022 11:46), Max: 98.1 (24 Nov 2022 16:54)  HR: 61 (25 Nov 2022 11:46) (57 - 64)  BP: 173/112 (25 Nov 2022 11:46) (145/92 - 181/95)  BP(mean): --  RR: 18 (25 Nov 2022 11:46) (16 - 18)  SpO2: 94% (25 Nov 2022 11:46) (94% - 97%)    Parameters below as of 25 Nov 2022 11:46  Patient On (Oxygen Delivery Method): room air      INTAKE AND OUTPUT:   11-24 @ 07:01  -  11-25 @ 07:00  --------------------------------------------------------  IN: 580 mL / OUT: 400 mL / NET: 180 mL    PHYSICAL EXAM:  Constitutional: Comfortable . No acute distress.   HEENT: Atraumatic and normocephalic , neck is supple . no JVD. No carotid bruit.  CNS: A&Ox3. No focal deficits. No tremor  Respiratory: CTAB, unlabored   Cardiovascular: RRR normal s1 s2. No murmur. No rubs or gallop.  Gastrointestinal: Soft, non-tender. +Bowel sounds.   MSK: full ROM extremities x 4  Extremities: No edema. No cyanosis   Psychiatric: Calm . no agitation.   Skin: Warm and dry, no ulcers on extremities       LABS:                        11.9   9.97  )-----------( 312      ( 24 Nov 2022 05:15 )             36.2     11-25    140  |  105  |  11.7  ----------------------------<  118<H>  3.6   |  22.0  |  0.57    Ca    8.8      25 Nov 2022 04:45  Phos  4.4     11-24  Mg     2.0     11-25    TPro  6.5<L>  /  Alb  4.0  /  TBili  0.5  /  DBili  x   /  AST  22  /  ALT  20  /  AlkPhos  141<H>  11-25      INTERPRETATION OF TELEMETRY: NSR  no atrial fib    ECG: NSR prwp cannot r/o awmi non specific st changes    RADIOLOGY & ADDITIONAL STUDIES:    X-ray:  pending  CT scan:   MRI:   US:                                                [facility]                                              CARDIOLOGY AT [facility]                                                   [address], [place] [address]                                             Telephone: [phone]. Fax:[phone]                                                         CARDIOLOGY CONSULTATION NOTE                                                                                             History obtained by: Patient and medical record  Community Cardiologist: Dr. Diaz   obtained: Yes [  ] No [ x ]  Reason for Consultation: CVA  Avialable out pt records reviewed: Yes [ s ] No [  ]    Real name Tacho Valle  [birth year]      This is a 60 y/o female smoker with alchoholism (at least bottle of wine per day) , HTN Depression, Copd s/p Stemi  s/p iwmi and stent to RCA and circ on plavix and asa  and midly reduced EF 45%  who presented with  a Stroke Code. Acute onset of slurred speech, Rt sided weakness and numbness noted by Pt's friend while getting into a car around 730AM after shopping at store. No HA or dizziness. No SOB or fevers  Symptoms have totally improved.   In ED, NIHSS 13. CTA H/N / Brain and Perfusion + defect, M2 L MCA occlusion/Stenosis Given LWK < 3.5 hr, within window for TPA. TPa given -> Labs w/ WBC 11. SBP > 180, Cardene gtt started for BP control.        CARDIAC TESTING   ECHO:< from: TTE Echo Complete w/ Contrast w/ Doppler (11.23.22 @ 18:54) >  Summary:   1. Left ventricular ejection fraction, by visual estimation, is 40 to   45%.   2. Basal and mid inferior wall and basal and mid inferolateral wall are   abnormal as described above.   3. Spectral Doppler shows impaired relaxation pattern of left   ventricular myocardial filling (Grade I diastolic dysfunction).   4. There is mild left ventricular hypertrophy.   5. Mildly enlarged right atrium.   6. There is no evidence of pericardial effusion.   7. Mild mitral annular calcification.   8. Mild mitral valve regurgitation.   9. Mild thickening of the anterior and posterior mitral valve leaflets.  10. Mild tricuspid regurgitation.  11. Insufficient TR to accurately assess RVSP.  12. Sclerotic aortic valve with normal opening.    CATH report pending s/p stents to RCA and circ    PAST MEDICAL HISTORY  Hypertension  Ashd Iwmi (stemi) Stents to RCA & Circ  Depression    PAST SURGICAL HISTORY  H/O clavicle fracture    SOCIAL HISTORY:    smokes 1 ppd and drinks at least one bottle of wine per day      Family History of Cardiovascular Disease:  Yes [  x] No [  ]  Coronary Artery Disease in first degree relative: Yes [x  ] No [  ]  Sudden Cardiac Death in First degree relative: Yes [  ] No [ x ]      HOME MEDICATIONS:  ASA 81mg qd  Plavix 74mg qd  Lisinopril 10mg qd  Metoprolol ER 50 mg qd    CURRENT MEDICATIONS:  lisinopril 10 milliGRAM(s) Oral daily  metoprolol tartrate 25 milliGRAM(s) Oral two times a day  ARIPiprazole  FLUoxetine  polyethylene glycol 3350  senna  aspirin  chewable  atorvastatin  clopidogrel Tablet  enoxaparin Injectable  folic acid  influenza   Vaccine  multivitamin  thiamine    ALLERGIES: NKDAA    REVIEW OF SYMPTOMS:   CONSTITUTIONAL: No fever, no chills, no weight loss, no weight gain, no fatigue   ENMT:  No vertigo; No sinus or throat pain  NECK: No pain or stiffness  CARDIOVASCULAR: No chest pain, no dyspnea, no syncope/presyncope, no palpitations, no dizziness, no Orthopnea, no Paroxsymal nocturnal dyspnea  RESPIRATORY: no Shortness of breath, no cough, no wheezing  : No dysuria, no hematuria   GI: No dark color stool, no nausea, no diarrhea, no constipation, no abdominal pain   NEURO: No headache, no slurred speech   MUSCULOSKELETAL: No joint pain or swelling; No muscle, back, or extremity pain  PSYCH: No agitation, no anxiety.    ALL OTHER REVIEW OF SYSTEMS ARE NEGATIVE.      Vital Signs Last 24 Hrs  T(C): 36.4 (25 Nov 2022 11:46), Max: 36.7 (24 Nov 2022 16:54)  T(F): 97.6 (25 Nov 2022 11:46), Max: 98.1 (24 Nov 2022 16:54)  HR: 61 (25 Nov 2022 11:46) (57 - 64)  BP: 173/112 (25 Nov 2022 11:46) (145/92 - 181/95)  BP(mean): --  RR: 18 (25 Nov 2022 11:46) (16 - 18)  SpO2: 94% (25 Nov 2022 11:46) (94% - 97%)    Parameters below as of 25 Nov 2022 11:46  Patient On (Oxygen Delivery Method): room air      INTAKE AND OUTPUT:   11-24 @ 07:01  -  11-25 @ 07:00  --------------------------------------------------------  IN: 580 mL / OUT: 400 mL / NET: 180 mL    PHYSICAL EXAM:  Constitutional: Comfortable . No acute distress.   HEENT: Atraumatic and normocephalic , neck is supple . no JVD. No carotid bruit.  CNS: A&Ox3. No focal deficits. No tremor  Respiratory: CTAB, unlabored   Cardiovascular: RRR normal s1 s2. No murmur. No rubs or gallop.  Gastrointestinal: Soft, non-tender. +Bowel sounds.   MSK: full ROM extremities x 4  Extremities: No edema. No cyanosis   Psychiatric: Calm . no agitation.   Skin: Warm and dry, no ulcers on extremities       LABS:                        11.9   9.97  )-----------( 312      ( 24 Nov 2022 05:15 )             36.2     11-25    140  |  105  |  11.7  ----------------------------<  118<H>  3.6   |  22.0  |  0.57    Ca    8.8      25 Nov 2022 04:45  Phos  4.4     11-24  Mg     2.0     11-25    TPro  6.5<L>  /  Alb  4.0  /  TBili  0.5  /  DBili  x   /  AST  22  /  ALT  20  /  AlkPhos  141<H>  11-25      INTERPRETATION OF TELEMETRY: NSR  no atrial fib    ECG: NSR prwp cannot r/o awmi non specific st changes    RADIOLOGY & ADDITIONAL STUDIES:    X-ray:  pending  CT scan:   MRI:   US:

## 2022-11-25 NOTE — DISCHARGE NOTE PROVIDER - [email or web address]_ALL_CORE_FT
PRINCIPAL DISCHARGE DIAGNOSIS  Diagnosis: Cerebrovascular accident (CVA)  Assessment and Plan of Treatment:       SECONDARY DISCHARGE DIAGNOSES  Diagnosis: Hypertensive urgency  Assessment and Plan of Treatment:      PRINCIPAL DISCHARGE DIAGNOSIS  Diagnosis: Cerebrovascular accident (CVA)  Assessment and Plan of Treatment: Continue current medications as prescribed.    Follow up with primary doctor, neurology, and cardiology.      SECONDARY DISCHARGE DIAGNOSES  Diagnosis: Hypertensive urgency  Assessment and Plan of Treatment: Continue current medications as prescribed.  Follow up with primary doctor and cardiology.     PRINCIPAL DISCHARGE DIAGNOSIS  Diagnosis: Cerebrovascular accident (CVA)  Assessment and Plan of Treatment: Continue current medications as prescribed; aspirin, plavix and lipitor  Follow up with primary doctor, neurology, and cardiology within 1 week  Please follow up with Dr. Terrazas within 2-3 weeks for evaluation of your loop recorder      SECONDARY DISCHARGE DIAGNOSES  Diagnosis: Hypertensive urgency  Assessment and Plan of Treatment: Continue current medications as prescribed.  Adhere to a low sodium diet  Follow up with primary doctor and cardiology.    Diagnosis: Alcohol abuse  Assessment and Plan of Treatment: alcohol cessation discussed  please take supplements  follow up with your PCP within 1 week

## 2022-11-25 NOTE — BH CONSULTATION LIAISON ASSESSMENT NOTE - SELF INJURIOUS BEHAVIOR WITHOUT SUICIDAL INTENT:
"Anesthesia Evaluation      Patient summary reviewed   History of anesthetic complications     Airway   Mallampati: I  Neck ROM: full   Pulmonary - negative ROS and normal exam                          Cardiovascular - negative ROS and normal exam  Exercise tolerance: > or = 4 METS   Neuro/Psych - negative ROS   (-) no neuromuscular disease    Endo/Other - negative ROS      GI/Hepatic/Renal - negative ROS   (-) renal disease     Other findings: Possible slow emergence, had stone surgery at [facility] about 2006, will try to find record. Addendum: surgery was 6/6/2006, nothing remarkable in anesthesia record.  Case finished at 1000, pt states he was discharged about noon.  He states \"I was the last one there\" and is basing presumed slow emergence on this perception.    Nephrolithiasis plus bladder stone.  R inguinal hernia.  Hg 15.1, K 4.3, GFR>60, t-bili elevated at 1.7.      Dental - normal exam                        Anesthesia Plan  Planned anesthetic: general endotracheal    ASA 2   Induction: intravenous   Anesthetic plan and risks discussed with: patient    Post-op plan: routine recovery          " None known

## 2022-11-25 NOTE — BH CONSULTATION LIAISON ASSESSMENT NOTE - NSICDXPASTMEDICALHX_GEN_ALL_CORE_FT
PAST MEDICAL HISTORY:  CAD (coronary artery disease) S/p Stent x 2    Depression, major     Hypertension

## 2022-11-25 NOTE — BH CONSULTATION LIAISON ASSESSMENT NOTE - DETAILS
Reports one prior SA via overdosing on pills about 20 years ago, interrupted and aborted by parents

## 2022-11-25 NOTE — BH CONSULTATION LIAISON ASSESSMENT NOTE - RISK ASSESSMENT
Level of Risk: LOW    Risk factors: , prior SA, unemployed, medical co-morbidities, age    Protective factors: Female, no access to firearms at home

## 2022-11-25 NOTE — CONSULT NOTE ADULT - REASON FOR ADMISSION
Slurred speech and Rt sided Numbness/weakness

## 2022-11-25 NOTE — CONSULT NOTE ADULT - SUBJECTIVE AND OBJECTIVE BOX
61yF was admitted on 11-23 with right sided weakness and numbness.      Imaging performed:  HEAD CT 11/24 - No acute intracranial hemorrhage, mass effect, or shift of the midline structures. Similar-appearing mild chronic white matter microvascular type changes.    CT PERFUSION 11/23 - NO EVIDENCE OF AN ACUTE INTRACRANIAL HEMORRHAGE, MIDLINE SHIFT, OR HYDROCEPHALUS. *  NO HEMODYNAMIC SIGNIFICANT NARROWING WITHIN THE NECK. *  SEVERE FOCAL NARROWING VERSUS SMALL FOCAL OCCLUSION DISTAL M2 SEGMENT POSTERIOR DIVISION LEFT MIDDLE CEREBRAL ARTERY. *  AREA OF ISCHEMIA LEFT PARIETAL LOBE WITH A VOLUME OF 27 ML, CORE   INFARCT OF 11 ML, AND PENUMBRA OF 16 ML.    Patient reports that his right arm weakness resolved and feels that the numbness in improved.     REVIEW OF SYSTEMS  Constitutional - No fever, No weight loss, +fatigue  HEENT - No eye pain, No visual disturbances, No difficulty hearing, No tinnitus, No vertigo, No neck pain  Respiratory - No cough, No wheezing, No shortness of breath  Cardiovascular - No chest pain, No palpitations  Gastrointestinal - No abdominal pain, No nausea, No vomiting, No diarrhea, No constipation  Genitourinary - No dysuria, No frequency, No hematuria, No incontinence  Neurological - No headaches, No memory loss, No loss of strength, +numbness, +tremors  Skin - No itching, No rashes, No lesions   Endocrine - No temperature intolerance  Musculoskeletal - No joint pain, No joint swelling, No muscle pain  Psychiatric - No depression, No anxiety    VITALS  T(C): 36.6 (11-25-22 @ 04:53), Max: 36.7 (11-24-22 @ 12:00)  HR: 59 (11-25-22 @ 04:53) (57 - 71)  BP: 150/90 (11-25-22 @ 06:46) (140/86 - 181/95)  RR: 17 (11-25-22 @ 04:53) (12 - 22)  SpO2: 95% (11-25-22 @ 04:53) (93% - 97%)  Wt(kg): --    PAST MEDICAL & SURGICAL HISTORY  Sys hypertension    Hypertension    CAD (coronary artery disease)    Depression, major    H/O clavicle fracture        FUNCTIONAL HISTORY  Independent    CURRENT FUNCTIONAL STATUS  11/25 PMR  Independent with bed mobility, set up with breakfast    Pending formal eval    SOCIAL HISTORY - as per documentation/history  Smoking - +  EtOH - +  Drugs - None    FAMILY HISTORY   No pertinent family history in first degree relatives        ALLERGIES  No Known Allergies      MEDICATIONS   acetaminophen     Tablet .. 650 milliGRAM(s) Oral every 6 hours PRN  ARIPiprazole 2 milliGRAM(s) Oral at bedtime  aspirin  chewable 81 milliGRAM(s) Oral daily  atorvastatin 80 milliGRAM(s) Oral at bedtime  clopidogrel Tablet 75 milliGRAM(s) Oral daily  enoxaparin Injectable 40 milliGRAM(s) SubCutaneous <User Schedule>  FLUoxetine 60 milliGRAM(s) Oral at bedtime  folic acid 1 milliGRAM(s) Oral daily  hydrOXYzine hydrochloride 25 milliGRAM(s) Oral three times a day  influenza   Vaccine 0.5 milliLiter(s) IntraMuscular once  LORazepam   Injectable 2 milliGRAM(s) IV Push every 1 hour PRN  metoprolol tartrate 25 milliGRAM(s) Oral two times a day  multivitamin 1 Tablet(s) Oral daily  ondansetron Injectable 4 milliGRAM(s) IV Push every 6 hours PRN  polyethylene glycol 3350 17 Gram(s) Oral daily  senna 2 Tablet(s) Oral at bedtime  thiamine 100 milliGRAM(s) Oral daily      RECENT LABS - Reviewed  CBC Full  -  ( 24 Nov 2022 05:15 )  WBC Count : 9.97 K/uL  RBC Count : 4.00 M/uL  Hemoglobin : 11.9 g/dL  Hematocrit : 36.2 %  Platelet Count - Automated : 312 K/uL  Mean Cell Volume : 90.5 fl  Mean Cell Hemoglobin : 29.8 pg  Mean Cell Hemoglobin Concentration : 32.9 gm/dL  Auto Neutrophil # : x  Auto Lymphocyte # : x  Auto Monocyte # : x  Auto Eosinophil # : x  Auto Basophil # : x  Auto Neutrophil % : x  Auto Lymphocyte % : x  Auto Monocyte % : x  Auto Eosinophil % : x  Auto Basophil % : x    11-25    140  |  105  |  11.7  ----------------------------<  118<H>  3.6   |  22.0  |  0.57    Ca    8.8      25 Nov 2022 04:45  Phos  4.4     11-24  Mg     2.0     11-25    TPro  6.5<L>  /  Alb  4.0  /  TBili  0.5  /  DBili  x   /  AST  22  /  ALT  20  /  AlkPhos  141<H>  11-25        ----------------------------------------------------------------------------------------  PHYSICAL EXAM  Constitutional - NAD, Comfortable  HEENT - NCAT, EOMI  Neck - Supple, No limited ROM  Chest - Breathing comfortably, No wheezing  Cardiovascular - S1S2   Abdomen - Soft   Extremities - No C/C/E, No calf tenderness   Neurologic Exam -                    Cognitive - AAO to self, place, date, year, situation     Communication - Fluent, Mild dysarthria     Cranial Nerves - CN 2-12 intact     FUNCTIONAL MOTOR EXAM - No focal deficits                    LEFT    UE - ShAB 5/5, EF 5/5, EE 5/5, WE 5/5,  5/5                    RIGHT UE - ShAB 5/5, EF 5/5, EE 5/5, WE 5/5,  5/5                    LEFT    LE - HF 5/5, KE 5/5, DF 5/5, PF 5/5                    RIGHT LE - HF 5/5, KE 5/5, DF 5/5, PF 5/5        Sensory - Slight decrease in the right UE to LT  Psychiatric - Mood stable, Affect WNL  ----------------------------------------------------------------------------------------  ASSESSMENT/PLAN  61yFemale with functional deficits after an acute CVA  Acute left CVA - ASA, Plavix  HTN - Lopressor   EtOH Use - Folate, Thiamine, MVI, Ativan PRN   Mood - Abilify, Prozac, Atarax  Pain - Tylenol  DVT PPX - SCDs, Lovenox  Rehab - Patient's exam is improving. Functionally appears to be progressing to achieve functional goals for DC HOME.    Will continue to follow. Rehab recommendations are dependent on how functional progress changes as well as how patient continues to participate and tolerate therapeutic interventions, which may change. Recommend ongoing mobilization by staff to maintain cardiopulmonary function and prevention of secondary complications related to debility. Discussed with rehab team.

## 2022-11-25 NOTE — DISCHARGE NOTE PROVIDER - NSDCMRMEDTOKEN_GEN_ALL_CORE_FT
ARIPiprazole 2 mg oral tablet: 1 tab(s) orally once a day (at bedtime)  aspirin 81 mg oral tablet, chewable: 1 tab(s) orally once a day  atorvastatin 80 mg oral tablet: 1 tab(s) orally once a day  clopidogrel 75 mg oral tablet: 1 tab(s) orally once a day  FLUoxetine 20 mg oral capsule: 3 cap(s) orally once a day  hydrOXYzine pamoate 25 mg oral capsule: 1 cap(s) orally 3 times a day  lisinopril 20 mg oral tablet: 1 tab(s) orally once a day  metoprolol succinate 50 mg oral tablet, extended release: 1 tab(s) orally once a day  traZODone 50 mg oral tablet: 1 tab(s) orally once a day, As Needed   ARIPiprazole 2 mg oral tablet: 1 tab(s) orally once a day (at bedtime)  aspirin 81 mg oral tablet, chewable: 1 tab(s) orally once a day  atorvastatin 80 mg oral tablet: 1 tab(s) orally once a day  clopidogrel 75 mg oral tablet: 1 tab(s) orally once a day  FLUoxetine 20 mg oral capsule: 3 cap(s) orally once a day  folic acid 1 mg oral tablet: 1 tab(s) orally once a day  hydrOXYzine pamoate 25 mg oral capsule: 1 cap(s) orally 3 times a day  lisinopril 10 mg oral tablet: 1 tab(s) orally once a day  metoprolol tartrate 25 mg oral tablet: 1 tab(s) orally 2 times a day  Multiple Vitamins oral tablet: 1 tab(s) orally once a day  thiamine 100 mg oral tablet: 1 tab(s) orally once a day  traZODone 50 mg oral tablet: 1 tab(s) orally once a day, As Needed   ARIPiprazole 2 mg oral tablet: 1 tab(s) orally once a day (at bedtime)  aspirin 81 mg oral tablet, chewable: 1 tab(s) orally once a day  atorvastatin 80 mg oral tablet: 1 tab(s) orally once a day  clopidogrel 75 mg oral tablet: 1 tab(s) orally once a day  FLUoxetine 20 mg oral capsule: 3 cap(s) orally once a day  folic acid 1 mg oral tablet: 1 tab(s) orally once a day  hydrOXYzine pamoate 25 mg oral capsule: 1 cap(s) orally 3 times a day  lisinopril 10 mg oral tablet: 1 tab(s) orally once a day  metoprolol tartrate 25 mg oral tablet: 1 tab(s) orally 2 times a day  Multiple Vitamins oral tablet: 1 tab(s) orally once a day  Outpatient OT: Dx: CVA  thiamine 100 mg oral tablet: 1 tab(s) orally once a day  traZODone 50 mg oral tablet: 1 tab(s) orally once a day, As Needed

## 2022-11-25 NOTE — BH CONSULTATION LIAISON ASSESSMENT NOTE - NSBHCHARTREVIEWVS_PSY_A_CORE FT
Vital Signs Last 24 Hrs  T(C): 36.4 (25 Nov 2022 11:46), Max: 36.7 (24 Nov 2022 16:54)  T(F): 97.6 (25 Nov 2022 11:46), Max: 98.1 (24 Nov 2022 16:54)  HR: 61 (25 Nov 2022 11:46) (57 - 64)  BP: 173/112 (25 Nov 2022 11:46) (145/92 - 181/95)  BP(mean): --  RR: 18 (25 Nov 2022 11:46) (16 - 18)  SpO2: 94% (25 Nov 2022 11:46) (94% - 97%)    Parameters below as of 25 Nov 2022 11:46  Patient On (Oxygen Delivery Method): room air

## 2022-11-25 NOTE — BH CONSULTATION LIAISON ASSESSMENT NOTE - NSBHATTESTTYPESTAFF_PSY_A_CORE
Future appt:     Your appointments     Date & Time Appointment Department [facility] [place])    Jun 09, 2020  9:00 AM CDT Follow up - Extended with Juan Carlos Stafford MD [address] [place] [address], [place] ([facility])            Effie Chris Resident

## 2022-11-25 NOTE — BH CONSULTATION LIAISON ASSESSMENT NOTE - NSBHCHARTREVIEWINVESTIGATE_PSY_A_CORE FT
< from: CT Head No Cont (11.24.22 @ 09:02) >      ACC: [id number] EXAM:  CT BRAIN                          PROCEDURE DATE:  11/24/2022          INTERPRETATION:  .    CLINICAL INFORMATION: 24 hours post appearance.    TECHNIQUE: Multiple axial CT images of the head were obtained without   contrast. Sagittal and coronal reconstructed images were acquired from   the source data.    COMPARISON: No prior CT studies of the brain are available for comparison   at this institution.    FINDINGS: There is no acute intracranial hemorrhage, mass effect, shift   of the midline structures, herniation, extra-axial fluid collection, or   hydrocephalus.    There is diffuse cerebral volume loss with prominence of the sulci,   fissures, and cisternal spaces which is normal for the patient's age.   There is mild deep and periventricular white matter hypoattenuation   statistically compatible with microvascular changes given calcific   atherosclerotic disease of the intracranial arteries.    Scattered mucosal thickening is seen throughout the paranasal sinuses.   Small polyps versus retention cyst within the right sphenoid and left   maxillary sinuses. The mastoid air cells are clear. A nasal septal   perforation appears unchanged.    Chronic bilateral nasal bone deformities.    The calvarium is intact. The imaged orbits are unremarkable apart from a   right-sided cataract removal.    IMPRESSION: No acute intracranial hemorrhage, mass effect, or shift of   the midline structures.    Similar-appearing mild chronic white matter microvascular type changes.    --- End of Report ---    < end of copied text >

## 2022-11-25 NOTE — BH CONSULTATION LIAISON ASSESSMENT NOTE - HPI (INCLUDE ILLNESS QUALITY, SEVERITY, DURATION, TIMING, CONTEXT, MODIFYING FACTORS, ASSOCIATED SIGNS AND SYMPTOMS)
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 Psychiatry C/L consulted for depression and anxiety.    Patient seen and assessed. Patient was initially seen standing at neighboring patient's bedside in an untied gown before walking over to her own bed to speak with undersigned providers. The patient reports that she has been dealing with increased depression for some time now along with increased anxiety especially after recent incident of stroke. Denies any current weakness from the stroke despite positive findings on imaging of head. She reports difficulties with sleep, anhedonia, anergia, concentration difficulties, but denies SI. Also denying HI and AVH. Denies any history of alyssa or psychosis. Reports history of alcohol use disorder, drinking 3+ glasses of wine per day, usually starting at 10 am. Denies any history of withdrawal symptoms or DTs in past. Reports past inpatient rehab treatment for alcohol. Denies any other substance use besides nicotine. Also reports unmanaged ongoing anxiety, not adequately controlled with current medication regimen.    Chart review performed with patient's name, showing previous charts. Per documentation from ED Psychiatry Assessment in 2017:    "Pt. is a 54 yo  female who presents to the ED for medication refills.  She reports she was previously under the care of Arabella Mac for psychiatric medications and Suboxone.  She said she made a mistake and used cocaine which showed up in her drug screen so her provider refuses to prescribe any of her medication.  Pt. reports she takes : Suboxone which she stopped, Xanax 0.5mg BID PRN, Prozac 20mg QD; Lamictal 100mg QAM and Adderall 5mg QAM and Qnoon.  Pt. reports hx of shoulder injury which makes it difficult for her to work.  She is working p/t at [employer] and finds the Adderall helps her to focus.  She reports she has not had Suboxone in five days and went through withdrawal, sweating, nausea.  Denies any withdrawal sxs. currently.  Pt. denies any suicidal or homicidal ideation/plan/intent.  She reports she had SI about five years ago but did not act on it.  She denies auditory/visual/hallucinations, paranoia, delusions, mood lability.  She lives by herself in her own apartment.   three times.  Pt. reports her sister who is in the mental health field is supportive and she speaks to her several times a week. Sleep and appetite are WNL.  Pt. would like new provider.  Will link with [facility]."

## 2022-11-25 NOTE — PROGRESS NOTE ADULT - SUBJECTIVE AND OBJECTIVE BOX
CC: Follow up     INTERVAL HPI/OVERNIGHT EVENTS: Patient seen and examined, no acute complaints overnight. Denies chest pain sob or palpitations. Right sided weakness and numbness improving      Vital Signs Last 24 Hrs  T(C): 36.6 (25 Nov 2022 04:53), Max: 36.7 (24 Nov 2022 12:00)  T(F): 97.9 (25 Nov 2022 04:53), Max: 98.1 (24 Nov 2022 16:54)  HR: 59 (25 Nov 2022 04:53) (57 - 68)  BP: 150/90 (25 Nov 2022 06:46) (145/92 - 181/95)  BP(mean): 109 (24 Nov 2022 12:00) (109 - 109)  RR: 17 (25 Nov 2022 04:53) (12 - 18)  SpO2: 95% (25 Nov 2022 04:53) (94% - 97%)    Parameters below as of 25 Nov 2022 04:53  Patient On (Oxygen Delivery Method): room air        PHYSICAL EXAM:    GENERAL: NAD, AOX3  HEAD:  Atraumatic, Normocephalic  EYES:conjunctiva and sclera clear  ENMT: Moist mucous membranes  NECK: Supple, No JVD  NERVOUS SYSTEM:  Alert & Oriented X3, Motor Strength 5/5 B/L upper and lower extremities  CHEST/LUNG: Clear to auscultation bilaterally; No rales, rhonchi, wheezing, or rubs  HEART: Regular rate and rhythm; No murmurs, rubs, or gallops  ABDOMEN: Soft, Nontender, Nondistended; Bowel sounds present  EXTREMITIES:  2+ Peripheral Pulses, No clubbing, cyanosis, or edema        MEDICATIONS  (STANDING):  ARIPiprazole 2 milliGRAM(s) Oral at bedtime  aspirin  chewable 81 milliGRAM(s) Oral daily  atorvastatin 80 milliGRAM(s) Oral at bedtime  clopidogrel Tablet 75 milliGRAM(s) Oral daily  enoxaparin Injectable 40 milliGRAM(s) SubCutaneous <User Schedule>  FLUoxetine 60 milliGRAM(s) Oral at bedtime  folic acid 1 milliGRAM(s) Oral daily  hydrALAZINE Injectable 10 milliGRAM(s) IV Push once  hydrOXYzine hydrochloride 25 milliGRAM(s) Oral three times a day  influenza   Vaccine 0.5 milliLiter(s) IntraMuscular once  lisinopril 10 milliGRAM(s) Oral daily  metoprolol tartrate 25 milliGRAM(s) Oral two times a day  multivitamin 1 Tablet(s) Oral daily  polyethylene glycol 3350 17 Gram(s) Oral daily  senna 2 Tablet(s) Oral at bedtime  thiamine 100 milliGRAM(s) Oral daily    MEDICATIONS  (PRN):  acetaminophen     Tablet .. 650 milliGRAM(s) Oral every 6 hours PRN Temp greater or equal to 38C (100.4F), Mild Pain (1 - 3), Moderate Pain (4 - 6)  LORazepam   Injectable 2 milliGRAM(s) IV Push every 1 hour PRN Symptom-triggered: each CIWA -Ar score 8 or GREATER  ondansetron Injectable 4 milliGRAM(s) IV Push every 6 hours PRN Nausea and/or Vomiting      Allergies    No Known Allergies    Intolerances          LABS:                          11.9   9.97  )-----------( 312      ( 24 Nov 2022 05:15 )             36.2     11-25    140  |  105  |  11.7  ----------------------------<  118<H>  3.6   |  22.0  |  0.57    Ca    8.8      25 Nov 2022 04:45  Phos  4.4     11-24  Mg     2.0     11-25    TPro  6.5<L>  /  Alb  4.0  /  TBili  0.5  /  DBili  x   /  AST  22  /  ALT  20  /  AlkPhos  141<H>  11-25          RADIOLOGY & ADDITIONAL TESTS:

## 2022-11-25 NOTE — BH CONSULTATION LIAISON ASSESSMENT NOTE - NSBHCONSULTFOLLOWAFTERCARE_PSY_A_CORE FT
Outpatient follow up through [facility] for therapy and medication management

## 2022-11-25 NOTE — DISCHARGE NOTE PROVIDER - CARE PROVIDERS DIRECT ADDRESSES
,DirectAddress_Unknown ,DirectAddress_Unknown,DirectAddress_Unknown ,DirectAddress_Unknown,DirectAddress_Unknown,[email or web address]

## 2022-11-25 NOTE — PROGRESS NOTE ADULT - SUBJECTIVE AND OBJECTIVE BOX
Preliminary note, offical recommendations pending attending review/signature   HPI:  61F Smoker / EtOH PMH Depression/Anxiety, COPD not on Home O2, CAD S/p Stent x 2 on ASA/Plavix P/W to ED as a Stroke Code. Acute onset of slurred speech, Rt sided weakness and numbness noted by Pt's friend while getting into a car around ~715AM after shopping at store. No similar events.In ED, NIHSS 13. CTA H/N / Brain and Perfusion + defect, M2 L MCA occlusion/Stenosis Given LWK < 3.5 hr, within window for TPA. Labs w/ WBC 11. SBP > 180, Cardene gtt started for BP control.   TPA given 0924 am (bolus), gtt initiated subsequently , not endovascular candidate due to no large vessel occlusion.   Admission NIHSS 13  Pre-MRS 0      SUBJECTIVE: No events overnight.  No new neurologic complaints.  ROS reported negative unless otherwise noted.    acetaminophen     Tablet .. 650 milliGRAM(s) Oral every 6 hours PRN  ARIPiprazole 2 milliGRAM(s) Oral at bedtime  aspirin  chewable 81 milliGRAM(s) Oral daily  atorvastatin 80 milliGRAM(s) Oral at bedtime  clopidogrel Tablet 75 milliGRAM(s) Oral daily  enoxaparin Injectable 40 milliGRAM(s) SubCutaneous <User Schedule>  FLUoxetine 60 milliGRAM(s) Oral at bedtime  folic acid 1 milliGRAM(s) Oral daily  hydrOXYzine hydrochloride 25 milliGRAM(s) Oral three times a day  influenza   Vaccine 0.5 milliLiter(s) IntraMuscular once  LORazepam   Injectable 2 milliGRAM(s) IV Push every 1 hour PRN  metoprolol tartrate 25 milliGRAM(s) Oral two times a day  multivitamin 1 Tablet(s) Oral daily  ondansetron Injectable 4 milliGRAM(s) IV Push every 6 hours PRN  polyethylene glycol 3350 17 Gram(s) Oral daily  senna 2 Tablet(s) Oral at bedtime  thiamine 100 milliGRAM(s) Oral daily      PHYSICAL EXAM:   Vital Signs Last 24 Hrs  T(C): 36.6 (25 Nov 2022 04:53), Max: 36.8 (24 Nov 2022 08:00)  T(F): 97.9 (25 Nov 2022 04:53), Max: 98.2 (24 Nov 2022 08:00)  HR: 59 (25 Nov 2022 04:53) (57 - 71)  BP: 150/90 (25 Nov 2022 06:46) (140/86 - 181/95)  BP(mean): 109 (24 Nov 2022 12:00) (100 - 114)  RR: 17 (25 Nov 2022 04:53) (12 - 22)  SpO2: 95% (25 Nov 2022 04:53) (92% - 97%)    Parameters below as of 25 Nov 2022 04:53  Patient On (Oxygen Delivery Method): room air    General: NAD     NEUROLOGICAL EXAM:  Mental status: Awake, alert, oriented to person place time, events, able to repeat , follow commands, IDs objects   Cranial Nerves: no facial palsy, EOMI, VF appear full,  no nystagmus,  no dysarthria,  tongue midline  Motor exam: Normal tone, no drift  5/5 RUE, 5/5 RLE, 5/5 LUE, 5/5 LLE  Sensation: no sensory neglect   Coordination/ Gait: no ataxia  gait not tested  NIHSS 0      LABS:                        11.9   9.97  )-----------( 312      ( 24 Nov 2022 05:15 )             36.2    11-25    140  |  105  |  11.7  ----------------------------<  118<H>  3.6   |  22.0  |  0.57    Ca    8.8      25 Nov 2022 04:45  Phos  4.4     11-24  Mg     2.0     11-25    TPro  6.5<L>  /  Alb  4.0  /  TBili  0.5  /  DBili  x   /  AST  22  /  ALT  20  /  AlkPhos  141<H>  11-25  PT/INR - ( 23 Nov 2022 08:55 )   PT: 12.6 sec;   INR: 1.09 ratio         PTT - ( 23 Nov 2022 08:55 )  PTT:51.5 sec      IMAGING: Reviewed by me.       CT Head No Cont (11.24.22 @ 09:02)   No acute intracranial hemorrhage, mass effect, or shift of   the midline structures.    Similar-appearing mild chronic white matter microvascular type changes.    CT Head, CT perfusion  CT Angio Head/ Neck Stroke Protocol w/ IV Cont (11.23.22 @ 09:15)   *  NO EVIDENCE OF AN ACUTE INTRACRANIAL HEMORRHAGE, MIDLINE SHIFT, OR   HYDROCEPHALUS.  *  NO HEMODYNAMIC SIGNIFICANT NARROWING WITHIN THE NECK.  *  SEVERE FOCAL NARROWING VERSUS SMALL FOCAL OCCLUSION DISTAL M2 SEGMENT   POSTERIOR DIVISION LEFT MIDDLE CEREBRAL ARTERY.  *  AREA OF ISCHEMIA LEFT PARIETAL LOBE WITH A VOLUME OF 27 ML, CORE   INFARCT OF 11 ML, AND PENUMBRA OF 16 ML.    TTE 11/23/22  Summary:   1. Left ventricular ejection fraction, by visual estimation, is 40 to   45%.   2. Basal and mid inferior wall and basal and mid inferolateral wall are   abnormal as described above.   3. Spectral Doppler shows impaired relaxation pattern of left   ventricular myocardial filling (Grade I diastolic dysfunction).   4. There is mild left ventricular hypertrophy.   5. Mildly enlarged right atrium.   6. There is no evidence of pericardial effusion.   7. Mild mitral annular calcification.   8. Mild mitral valve regurgitation.   9. Mild thickening of the anterior and posterior mitral valve leaflets.  10. Mild tricuspid regurgitation.  11. Insufficient TR to accurately assess RVSP.  12. Sclerotic aortic valve with normal opening.     Preliminary note, offical recommendations pending attending review/signature   HPI:  61F Smoker / EtOH PMH Depression/Anxiety, COPD not on Home O2, CAD S/p Stent x 2 on ASA/Plavix P/W to ED as a Stroke Code. Acute onset of slurred speech, Rt sided weakness and numbness noted by Pt's friend while getting into a car around ~715AM after shopping at store. No similar events.In ED, NIHSS 13. CTA H/N / Brain and Perfusion + defect, M2 L MCA occlusion/Stenosis Given LWK < 3.5 hr, within window for TPA. Labs w/ WBC 11. SBP > 180, Cardene gtt started for BP control.   TPA given 0924 am (bolus), gtt initiated subsequently , not endovascular candidate due to no large vessel occlusion.   Admission NIHSS 13  Pre-MRS 0      SUBJECTIVE: No events overnight.  No new neurologic complaints.  ROS reported negative unless otherwise noted.    acetaminophen     Tablet .. 650 milliGRAM(s) Oral every 6 hours PRN  ARIPiprazole 2 milliGRAM(s) Oral at bedtime  aspirin  chewable 81 milliGRAM(s) Oral daily  atorvastatin 80 milliGRAM(s) Oral at bedtime  clopidogrel Tablet 75 milliGRAM(s) Oral daily  enoxaparin Injectable 40 milliGRAM(s) SubCutaneous <User Schedule>  FLUoxetine 60 milliGRAM(s) Oral at bedtime  folic acid 1 milliGRAM(s) Oral daily  hydrOXYzine hydrochloride 25 milliGRAM(s) Oral three times a day  influenza   Vaccine 0.5 milliLiter(s) IntraMuscular once  LORazepam   Injectable 2 milliGRAM(s) IV Push every 1 hour PRN  metoprolol tartrate 25 milliGRAM(s) Oral two times a day  multivitamin 1 Tablet(s) Oral daily  ondansetron Injectable 4 milliGRAM(s) IV Push every 6 hours PRN  polyethylene glycol 3350 17 Gram(s) Oral daily  senna 2 Tablet(s) Oral at bedtime  thiamine 100 milliGRAM(s) Oral daily      PHYSICAL EXAM:   Vital Signs Last 24 Hrs  T(C): 36.6 (25 Nov 2022 04:53), Max: 36.8 (24 Nov 2022 08:00)  T(F): 97.9 (25 Nov 2022 04:53), Max: 98.2 (24 Nov 2022 08:00)  HR: 59 (25 Nov 2022 04:53) (57 - 71)  BP: 150/90 (25 Nov 2022 06:46) (140/86 - 181/95)  BP(mean): 109 (24 Nov 2022 12:00) (100 - 114)  RR: 17 (25 Nov 2022 04:53) (12 - 22)  SpO2: 95% (25 Nov 2022 04:53) (92% - 97%)    Parameters below as of 25 Nov 2022 04:53  Patient On (Oxygen Delivery Method): room air    General: NAD     NEUROLOGICAL EXAM:  Mental status: Awake, alert, oriented to person place time, events, able to repeat , follow commands, IDs objects , follows cross midline commands   Cranial Nerves: no facial palsy, EOMI, VF appear full,  no nystagmus,  no dysarthria,  tongue midline  Motor exam: Normal tone, no drift  5/5 RUE, 5/5 RLE, 5/5 LUE, 5/5 LLE  Sensation: no sensory neglect   Coordination/ Gait: no ataxia  gait not tested  NIHSS 0      LABS:                        11.9   9.97  )-----------( 312      ( 24 Nov 2022 05:15 )             36.2    11-25    140  |  105  |  11.7  ----------------------------<  118<H>  3.6   |  22.0  |  0.57    Ca    8.8      25 Nov 2022 04:45  Phos  4.4     11-24  Mg     2.0     11-25    TPro  6.5<L>  /  Alb  4.0  /  TBili  0.5  /  DBili  x   /  AST  22  /  ALT  20  /  AlkPhos  141<H>  11-25  PT/INR - ( 23 Nov 2022 08:55 )   PT: 12.6 sec;   INR: 1.09 ratio         PTT - ( 23 Nov 2022 08:55 )  PTT:51.5 sec      IMAGING: Reviewed by me.   MRI 11/25/22 :   IMPRESSION: Acute posterior left MCA territory infarct.    CT Head No Cont (11.24.22 @ 09:02)   No acute intracranial hemorrhage, mass effect, or shift of   the midline structures.    Similar-appearing mild chronic white matter microvascular type changes.    CT Head, CT perfusion  CT Angio Head/ Neck Stroke Protocol w/ IV Cont (11.23.22 @ 09:15)   *  NO EVIDENCE OF AN ACUTE INTRACRANIAL HEMORRHAGE, MIDLINE SHIFT, OR   HYDROCEPHALUS.  *  NO HEMODYNAMIC SIGNIFICANT NARROWING WITHIN THE NECK.  *  SEVERE FOCAL NARROWING VERSUS SMALL FOCAL OCCLUSION DISTAL M2 SEGMENT   POSTERIOR DIVISION LEFT MIDDLE CEREBRAL ARTERY.  *  AREA OF ISCHEMIA LEFT PARIETAL LOBE WITH A VOLUME OF 27 ML, CORE   INFARCT OF 11 ML, AND PENUMBRA OF 16 ML.    TTE 11/23/22  Summary:   1. Left ventricular ejection fraction, by visual estimation, is 40 to   45%.   2. Basal and mid inferior wall and basal and mid inferolateral wall are   abnormal as described above.   3. Spectral Doppler shows impaired relaxation pattern of left   ventricular myocardial filling (Grade I diastolic dysfunction).   4. There is mild left ventricular hypertrophy.   5. Mildly enlarged right atrium.   6. There is no evidence of pericardial effusion.   7. Mild mitral annular calcification.   8. Mild mitral valve regurgitation.   9. Mild thickening of the anterior and posterior mitral valve leaflets.  10. Mild tricuspid regurgitation.  11. Insufficient TR to accurately assess RVSP.  12. Sclerotic aortic valve with normal opening.     HPI:  61F Smoker / EtOH PMH Depression/Anxiety, COPD not on Home O2, CAD S/p Stent x 2 on ASA/Plavix P/W to ED as a Stroke Code. Acute onset of slurred speech, Rt sided weakness and numbness noted by Pt's friend while getting into a car around ~715AM after shopping at store. No similar events.In ED, NIHSS 13. CTA H/N / Brain and Perfusion + defect, M2 L MCA occlusion/Stenosis Given LWK < 3.5 hr, within window for TPA. Labs w/ WBC 11. SBP > 180, Cardene gtt started for BP control.   TPA given 0924 am (bolus), gtt initiated subsequently , not endovascular candidate due to no large vessel occlusion.   Admission NIHSS 13  Pre-MRS 0      SUBJECTIVE: No events overnight.  No new neurologic complaints.  ROS reported negative unless otherwise noted.    acetaminophen     Tablet .. 650 milliGRAM(s) Oral every 6 hours PRN  ARIPiprazole 2 milliGRAM(s) Oral at bedtime  aspirin  chewable 81 milliGRAM(s) Oral daily  atorvastatin 80 milliGRAM(s) Oral at bedtime  clopidogrel Tablet 75 milliGRAM(s) Oral daily  enoxaparin Injectable 40 milliGRAM(s) SubCutaneous <User Schedule>  FLUoxetine 60 milliGRAM(s) Oral at bedtime  folic acid 1 milliGRAM(s) Oral daily  hydrOXYzine hydrochloride 25 milliGRAM(s) Oral three times a day  influenza   Vaccine 0.5 milliLiter(s) IntraMuscular once  LORazepam   Injectable 2 milliGRAM(s) IV Push every 1 hour PRN  metoprolol tartrate 25 milliGRAM(s) Oral two times a day  multivitamin 1 Tablet(s) Oral daily  ondansetron Injectable 4 milliGRAM(s) IV Push every 6 hours PRN  polyethylene glycol 3350 17 Gram(s) Oral daily  senna 2 Tablet(s) Oral at bedtime  thiamine 100 milliGRAM(s) Oral daily      PHYSICAL EXAM:   Vital Signs Last 24 Hrs  T(C): 36.6 (25 Nov 2022 04:53), Max: 36.8 (24 Nov 2022 08:00)  T(F): 97.9 (25 Nov 2022 04:53), Max: 98.2 (24 Nov 2022 08:00)  HR: 59 (25 Nov 2022 04:53) (57 - 71)  BP: 150/90 (25 Nov 2022 06:46) (140/86 - 181/95)  BP(mean): 109 (24 Nov 2022 12:00) (100 - 114)  RR: 17 (25 Nov 2022 04:53) (12 - 22)  SpO2: 95% (25 Nov 2022 04:53) (92% - 97%)    Parameters below as of 25 Nov 2022 04:53  Patient On (Oxygen Delivery Method): room air    General: NAD     NEUROLOGICAL EXAM:  Mental status: Awake, alert, oriented to person place time, events, able to repeat , follow commands, IDs objects , follows cross midline commands   Cranial Nerves: no facial palsy, EOMI, VF appear full,  no nystagmus,  no dysarthria,  tongue midline  Motor exam: Normal tone, no drift  5/5 RUE, 5/5 RLE, 5/5 LUE, 5/5 LLE  Sensation: no sensory neglect   Coordination/ Gait: no ataxia  gait not tested  NIHSS 0      LABS:                        11.9   9.97  )-----------( 312      ( 24 Nov 2022 05:15 )             36.2    11-25    140  |  105  |  11.7  ----------------------------<  118<H>  3.6   |  22.0  |  0.57    Ca    8.8      25 Nov 2022 04:45  Phos  4.4     11-24  Mg     2.0     11-25    TPro  6.5<L>  /  Alb  4.0  /  TBili  0.5  /  DBili  x   /  AST  22  /  ALT  20  /  AlkPhos  141<H>  11-25  PT/INR - ( 23 Nov 2022 08:55 )   PT: 12.6 sec;   INR: 1.09 ratio         PTT - ( 23 Nov 2022 08:55 )  PTT:51.5 sec      IMAGING: Reviewed by me.   MRI 11/25/22 :   IMPRESSION: Acute posterior left MCA territory infarct.    CT Head No Cont (11.24.22 @ 09:02)   No acute intracranial hemorrhage, mass effect, or shift of   the midline structures.    Similar-appearing mild chronic white matter microvascular type changes.    CT Head, CT perfusion  CT Angio Head/ Neck Stroke Protocol w/ IV Cont (11.23.22 @ 09:15)   *  NO EVIDENCE OF AN ACUTE INTRACRANIAL HEMORRHAGE, MIDLINE SHIFT, OR   HYDROCEPHALUS.  *  NO HEMODYNAMIC SIGNIFICANT NARROWING WITHIN THE NECK.  *  SEVERE FOCAL NARROWING VERSUS SMALL FOCAL OCCLUSION DISTAL M2 SEGMENT   POSTERIOR DIVISION LEFT MIDDLE CEREBRAL ARTERY.  *  AREA OF ISCHEMIA LEFT PARIETAL LOBE WITH A VOLUME OF 27 ML, CORE   INFARCT OF 11 ML, AND PENUMBRA OF 16 ML.    TTE 11/23/22  Summary:   1. Left ventricular ejection fraction, by visual estimation, is 40 to   45%.   2. Basal and mid inferior wall and basal and mid inferolateral wall are   abnormal as described above.   3. Spectral Doppler shows impaired relaxation pattern of left   ventricular myocardial filling (Grade I diastolic dysfunction).   4. There is mild left ventricular hypertrophy.   5. Mildly enlarged right atrium.   6. There is no evidence of pericardial effusion.   7. Mild mitral annular calcification.   8. Mild mitral valve regurgitation.   9. Mild thickening of the anterior and posterior mitral valve leaflets.  10. Mild tricuspid regurgitation.  11. Insufficient TR to accurately assess RVSP.  12. Sclerotic aortic valve with normal opening.

## 2022-11-25 NOTE — PROGRESS NOTE ADULT - ASSESSMENT
61 year old female with history of Smoking, CAD s/p PCI, HTN, HLD, COPD, Depression, Anxiety and Alcoholism presented with right sided weakness and numbness. She was Code Stroke in ER. CT showed + defect, M2 L MCA occlusion/Stenosis. She was given tPA and was admitted to ICU. She was started on Cardene Infusion for BP management and it has been weaned off. Symptoms are improving. Repeat CT Head with no hemorrhage. Downgraded to Medicine. TTE with EF of 40-45% with wall motion abnormalities, cardiology consulted. MRI of the brain pending    Assessment/Plan:    1) Acute CVA  - s/p tPA  - Stroke Protocol  - LDL 78  - HbA1c 5.7  - MRI Brain pending   - TTE with Ef of 40-45% with wall motion abnormalities   - Continue Aspirin, Plavix and Lipitor   - PT/OT/PMNR_- Home   - Neuro follow up noted     2. Cardiomyopathy with hypertensive urgency on admission  - TTE with EF of 40-45% with WMA   - Aspirin, plavix, Metoprolol and statin  - Add ACEI  - Cardiology evaluation    3.  Tobacco abuse   - Cessation counselling provided  - Does not want Nicotine Patch     4. History of alcohol use   - [facility] Protocol  - Thiamine for suspected deficiency, MVI and Folic Acid     5. Depression / Anxiety   - Continue Prozac and Abilify   - Psych Consult     DVT Prophylaxis -- Lovenox SQ    Discharge disposition; Home pending MRI brain and cardiology evaluation    61 year old female with history of Smoking, CAD s/p PCI, HTN, HLD, COPD, Depression, Anxiety and Alcoholism presented with right sided weakness and numbness. She was Code Stroke in ER. CT showed + defect, M2 L MCA occlusion/Stenosis. She was given tPA and was admitted to ICU. She was started on Cardene Infusion for BP management and it has been weaned off. Symptoms are improving. Repeat CT Head with no hemorrhage. Downgraded to Medicine. TTE with EF of 40-45% with wall motion abnormalities, cardiology consulted. MRI of the brain pending    Assessment/Plan:    1) Acute CVA  - s/p tPA  - Stroke Protocol  - LDL 78  - HbA1c 5.7  - MRI Brain pending   - TTE with Ef of 40-45% with wall motion abnormalities   - Seen by cardiology, ABA/ILR on Monday   - Continue Aspirin, Plavix and Lipitor   - PT/OT/PMNR_- Home   - Neuro follow up noted     2. Cardiomyopathy with hypertensive urgency on admission  - TTE with EF of 40-45% with WMA   - Aspirin, plavix, Metoprolol and statin  - Add ACEI  - Cardiology evaluation    3.  Tobacco abuse   - Cessation counselling provided  - Does not want Nicotine Patch     4. History of alcohol use   - [facility] Protocol  - Thiamine for suspected deficiency, MVI and Folic Acid     5. Depression / Anxiety   - Continue Prozac and Abilify   - Psych Consult     DVT Prophylaxis -- Lovenox SQ    Discharge disposition; Home pending MRI brain and ABA/ILR on Monday    61 year old female with history of Smoking, CAD s/p PCI, HTN, HLD, COPD, Depression, Anxiety and Alcoholism presented with right sided weakness and numbness. She was Code Stroke in ER. CT showed + defect, M2 L MCA occlusion/Stenosis. She was given tPA and was admitted to ICU. She was started on Cardene Infusion for BP management and it has been weaned off. Symptoms are improving. Repeat CT Head with no hemorrhage. Downgraded to Medicine. TTE with EF of 40-45% with wall motion abnormalities, cardiology consulted. MRI of the brain pending    Assessment/Plan:    1) Acute CVA  - s/p tPA  - Stroke Protocol  - LDL 78  - HbA1c 5.7  - MRI Brain Acute posterior left MCA territory infarct.  - TTE with Ef of 40-45% with wall motion abnormalities   - Seen by cardiology, ABA/ILR on Monday   - Continue Aspirin, Plavix and Lipitor   - PT/OT/PMNR_- Home   - Neuro follow up noted     2. Cardiomyopathy with hypertensive urgency on admission  - TTE with EF of 40-45% with WMA   - Aspirin, plavix, Metoprolol and statin  - Add ACEI  - Cardiology evaluation    3.  Tobacco abuse   - Cessation counselling provided  - Does not want Nicotine Patch     4. History of alcohol use   - [facility] Protocol  - Thiamine for suspected deficiency, MVI and Folic Acid     5. Depression / Anxiety   - Spoke with Dr Phelps, recommends changing Aripiprazole to AM instead of QHS  - Continue Fluoxetine  - Increase Hydroxyzine to 50mg Q8 hours prn    DVT Prophylaxis -- Lovenox SQ    Discharge disposition; Home ABA/ILR on Monday

## 2022-11-26 LAB
ALBUMIN SERPL ELPH-MCNC: 4.5 G/DL — SIGNIFICANT CHANGE UP (ref 3.3–5.2)
ALP SERPL-CCNC: 159 U/L — HIGH (ref 40–120)
ALT FLD-CCNC: 28 U/L — SIGNIFICANT CHANGE UP
ANION GAP SERPL CALC-SCNC: 11 MMOL/L — SIGNIFICANT CHANGE UP (ref 5–17)
AST SERPL-CCNC: 36 U/L — HIGH
BILIRUB DIRECT SERPL-MCNC: 0.1 MG/DL — SIGNIFICANT CHANGE UP (ref 0–0.3)
BILIRUB INDIRECT FLD-MCNC: 0.2 MG/DL — SIGNIFICANT CHANGE UP (ref 0.2–1)
BILIRUB SERPL-MCNC: 0.3 MG/DL — LOW (ref 0.4–2)
BUN SERPL-MCNC: 18.2 MG/DL — SIGNIFICANT CHANGE UP (ref 8–20)
CALCIUM SERPL-MCNC: 9.3 MG/DL — SIGNIFICANT CHANGE UP (ref 8.4–10.5)
CHLORIDE SERPL-SCNC: 102 MMOL/L — SIGNIFICANT CHANGE UP (ref 96–108)
CO2 SERPL-SCNC: 22 MMOL/L — SIGNIFICANT CHANGE UP (ref 22–29)
CREAT SERPL-MCNC: 0.74 MG/DL — SIGNIFICANT CHANGE UP (ref 0.5–1.3)
EGFR: 91 ML/MIN/1.73M2 — SIGNIFICANT CHANGE UP
GLUCOSE SERPL-MCNC: 111 MG/DL — HIGH (ref 70–99)
MAGNESIUM SERPL-MCNC: 2 MG/DL — SIGNIFICANT CHANGE UP (ref 1.6–2.6)
PHOSPHATE SERPL-MCNC: 4.2 MG/DL — SIGNIFICANT CHANGE UP (ref 2.4–4.7)
POTASSIUM SERPL-MCNC: 4.1 MMOL/L — SIGNIFICANT CHANGE UP (ref 3.5–5.3)
POTASSIUM SERPL-SCNC: 4.1 MMOL/L — SIGNIFICANT CHANGE UP (ref 3.5–5.3)
PROT SERPL-MCNC: 7.7 G/DL — SIGNIFICANT CHANGE UP (ref 6.6–8.7)
SODIUM SERPL-SCNC: 135 MMOL/L — SIGNIFICANT CHANGE UP (ref 135–145)

## 2022-11-26 PROCEDURE — 99233 SBSQ HOSP IP/OBS HIGH 50: CPT

## 2022-11-26 RX ADMIN — Medication 50 MILLIGRAM(S): at 16:27

## 2022-11-26 RX ADMIN — Medication 1 TABLET(S): at 18:19

## 2022-11-26 RX ADMIN — ARIPIPRAZOLE 2 MILLIGRAM(S): 15 TABLET ORAL at 18:18

## 2022-11-26 RX ADMIN — Medication 50 MILLIGRAM(S): at 09:01

## 2022-11-26 RX ADMIN — Medication 25 MILLIGRAM(S): at 05:14

## 2022-11-26 RX ADMIN — Medication 81 MILLIGRAM(S): at 18:18

## 2022-11-26 RX ADMIN — SENNA PLUS 2 TABLET(S): 8.6 TABLET ORAL at 22:31

## 2022-11-26 RX ADMIN — Medication 50 MILLIGRAM(S): at 23:44

## 2022-11-26 RX ADMIN — Medication 50 MILLIGRAM(S): at 18:19

## 2022-11-26 RX ADMIN — ATORVASTATIN CALCIUM 80 MILLIGRAM(S): 80 TABLET, FILM COATED ORAL at 22:31

## 2022-11-26 RX ADMIN — LISINOPRIL 10 MILLIGRAM(S): 2.5 TABLET ORAL at 05:14

## 2022-11-26 RX ADMIN — Medication 100 MILLIGRAM(S): at 18:19

## 2022-11-26 RX ADMIN — ENOXAPARIN SODIUM 40 MILLIGRAM(S): 100 INJECTION SUBCUTANEOUS at 18:19

## 2022-11-26 RX ADMIN — Medication 25 MILLIGRAM(S): at 18:20

## 2022-11-26 RX ADMIN — Medication 60 MILLIGRAM(S): at 22:31

## 2022-11-26 RX ADMIN — CLOPIDOGREL BISULFATE 75 MILLIGRAM(S): 75 TABLET, FILM COATED ORAL at 18:19

## 2022-11-26 RX ADMIN — Medication 1 MILLIGRAM(S): at 18:18

## 2022-11-26 NOTE — PROGRESS NOTE ADULT - SUBJECTIVE AND OBJECTIVE BOX
CHIEF COMPLAINT/INTERVAL HISTORY:    Patient is a 61y old  Female who presents with a chief complaint of Slurred speech and Rt sided Numbness/weakness (25 Nov 2022 13:02)    SUBJECTIVE & OBJECTIVE: Pt seen and examined at bedside. No overnight events. States right sided weakness improved. Reports feeling anxious, tremulous and diaphoretic. Started on librium for ETOH withdrawal. ABA/ILR on Monday.    ROS: No chest pain, palpitations, SOB, light headedness, dizziness, headache, nausea/vomiting, fevers/chills, abdominal pain, dysuria or increased urinary frequency.    ICU Vital Signs Last 24 Hrs  T(C): 36.6 (26 Nov 2022 22:30), Max: 36.7 (26 Nov 2022 04:55)  T(F): 97.8 (26 Nov 2022 22:30), Max: 98 (26 Nov 2022 04:55)  HR: 77 (26 Nov 2022 22:30) (62 - 77)  BP: 136/85 (26 Nov 2022 22:30) (125/85 - 147/97)  RR: 18 (26 Nov 2022 22:30) (18 - 18)  SpO2: 97% (26 Nov 2022 22:30) (94% - 97%)    O2 Parameters below as of 26 Nov 2022 22:30  Patient On (Oxygen Delivery Method): room air      MEDICATIONS  (STANDING):  ARIPiprazole 2 milliGRAM(s) Oral daily  aspirin  chewable 81 milliGRAM(s) Oral daily  atorvastatin 80 milliGRAM(s) Oral at bedtime  chlordiazePOXIDE   Oral   chlordiazePOXIDE 50 milliGRAM(s) Oral every 6 hours  clopidogrel Tablet 75 milliGRAM(s) Oral daily  enoxaparin Injectable 40 milliGRAM(s) SubCutaneous <User Schedule>  FLUoxetine 60 milliGRAM(s) Oral at bedtime  folic acid 1 milliGRAM(s) Oral daily  influenza   Vaccine 0.5 milliLiter(s) IntraMuscular once  lisinopril 10 milliGRAM(s) Oral daily  metoprolol tartrate 25 milliGRAM(s) Oral two times a day  multivitamin 1 Tablet(s) Oral daily  polyethylene glycol 3350 17 Gram(s) Oral daily  senna 2 Tablet(s) Oral at bedtime  thiamine 100 milliGRAM(s) Oral daily    MEDICATIONS  (PRN):  acetaminophen     Tablet .. 650 milliGRAM(s) Oral every 6 hours PRN Temp greater or equal to 38C (100.4F), Mild Pain (1 - 3), Moderate Pain (4 - 6)  hydrOXYzine hydrochloride 50 milliGRAM(s) Oral three times a day PRN anixety  LORazepam   Injectable 2 milliGRAM(s) IV Push every 1 hour PRN Symptom-triggered: each CIWA -Ar score 8 or GREATER  ondansetron Injectable 4 milliGRAM(s) IV Push every 6 hours PRN Nausea and/or Vomiting      LABS:    11-26    135  |  102  |  18.2  ----------------------------<  111<H>  4.1   |  22.0  |  0.74    Ca    9.3      26 Nov 2022 16:15  Phos  4.2     11-26  Mg     2.0     11-26    TPro  7.7  /  Alb  4.5  /  TBili  0.3<L>  /  DBili  0.1  /  AST  36<H>  /  ALT  28  /  AlkPhos  159<H>  11-26    PHYSICAL EXAM:    GENERAL: middle aged female, mild tremors on exam, NAD  HEAD:  Atraumatic, Normocephalic  EYES: EOMI, PERRLA, conjunctiva and sclera clear  ENMT: Moist mucous membranes  NECK: Supple, No JVD  NERVOUS SYSTEM:  Alert & Oriented X3, Motor Strength 5/5 B/L upper and lower extremities   CHEST/LUNG: Clear to auscultation bilaterally; No rales, rhonchi, wheezing, or rubs  HEART: Regular rate and rhythm; + S1/S2  ABDOMEN: Soft, Nontender, Nondistended; Bowel sounds present  EXTREMITIES:  no pedal edema

## 2022-11-26 NOTE — PROGRESS NOTE ADULT - ASSESSMENT
Patient 61 year old female with history of Smoking, CAD s/p PCI, HTN, HLD, COPD, Depression, Anxiety and Alcoholism presented with right sided weakness and numbness. She was Code Stroke in ER. CT showed + defect, M2 L MCA occlusion/Stenosis. She was given tPA and was admitted to ICU. She was started on Cardene Infusion for BP management and it has been weaned off. Symptoms are improving. Repeat CT Head with no hemorrhage and she was subsequently downgraded to the hospitalist service.    1) Acute Left MCA CVA s/p tPA  - symptoms resolved s/p tpa  - LDL 78  - HbA1c 5.7  - Continue Aspirin, Plavix and Lipitor   - MRI Brain and CTH reviewed  - TTE with EF 40-45%, Grade I DD, WMA (similar to prior)  - PMR - home  - ABA on Monday to exclude PFO or CHRISTIANA thrombus and if negative will proceed with ILR  - Neuro and cardio recs appreciated      2) Smoking  - Cessation counselling provided  - Nicotine Patch     3) CAD    - Denies anginal symptoms  - Continue Aspirin, Plavix, Lipitor and Metoprolol   - ABA on Monday  - cardio recs appreciated    4) Alcoholism  - CIWA Protocol  - Start Librium taper and continue symptom triggered Ativan  - Thiamine for suspected deficiency, MVI and Folic Acid   - Alcohol cessation discussed    5) Depression / Anxiety   - mildly anxious  - Continue Prozac and Abilify   - continue Vistaril TID PRN  - Psych Consult appreciated    6) Glucose Intolerance   - HbA1c 5.7  - Accu checks and ISS  - ADA diet     7) HTN  -BP stable  -continue metoprolol and lisinopril   -low sodium diet    8) HLD  -continue statin    9) History of CAD s/p PCI  -denies anginal symptoms  -TTE as before; small segmental WMA similar to prior  -continue cardiac medications  -telemonitoring  -cardio recs appreciated    DVT Prophylaxis - Lovenox SQ    Dispo: Discharge home on Monday after ABA/ILR if cleared by neuro/cardio.    Plan discussed with patient, RN

## 2022-11-27 LAB
ANION GAP SERPL CALC-SCNC: 18 MMOL/L — HIGH (ref 5–17)
BUN SERPL-MCNC: 20.9 MG/DL — HIGH (ref 8–20)
CALCIUM SERPL-MCNC: 9.8 MG/DL — SIGNIFICANT CHANGE UP (ref 8.4–10.5)
CHLORIDE SERPL-SCNC: 102 MMOL/L — SIGNIFICANT CHANGE UP (ref 96–108)
CO2 SERPL-SCNC: 20 MMOL/L — LOW (ref 22–29)
CREAT SERPL-MCNC: 0.72 MG/DL — SIGNIFICANT CHANGE UP (ref 0.5–1.3)
EGFR: 94 ML/MIN/1.73M2 — SIGNIFICANT CHANGE UP
GLUCOSE SERPL-MCNC: 112 MG/DL — HIGH (ref 70–99)
MAGNESIUM SERPL-MCNC: 2 MG/DL — SIGNIFICANT CHANGE UP (ref 1.6–2.6)
PHOSPHATE SERPL-MCNC: 4.2 MG/DL — SIGNIFICANT CHANGE UP (ref 2.4–4.7)
POTASSIUM SERPL-MCNC: 4.5 MMOL/L — SIGNIFICANT CHANGE UP (ref 3.5–5.3)
POTASSIUM SERPL-SCNC: 4.5 MMOL/L — SIGNIFICANT CHANGE UP (ref 3.5–5.3)
SARS-COV-2 RNA SPEC QL NAA+PROBE: SIGNIFICANT CHANGE UP
SODIUM SERPL-SCNC: 139 MMOL/L — SIGNIFICANT CHANGE UP (ref 135–145)

## 2022-11-27 PROCEDURE — 99232 SBSQ HOSP IP/OBS MODERATE 35: CPT

## 2022-11-27 RX ADMIN — ENOXAPARIN SODIUM 40 MILLIGRAM(S): 100 INJECTION SUBCUTANEOUS at 17:52

## 2022-11-27 RX ADMIN — LISINOPRIL 10 MILLIGRAM(S): 2.5 TABLET ORAL at 05:44

## 2022-11-27 RX ADMIN — CLOPIDOGREL BISULFATE 75 MILLIGRAM(S): 75 TABLET, FILM COATED ORAL at 13:23

## 2022-11-27 RX ADMIN — Medication 50 MILLIGRAM(S): at 05:42

## 2022-11-27 RX ADMIN — Medication 25 MILLIGRAM(S): at 21:32

## 2022-11-27 RX ADMIN — Medication 25 MILLIGRAM(S): at 17:52

## 2022-11-27 RX ADMIN — Medication 50 MILLIGRAM(S): at 13:22

## 2022-11-27 RX ADMIN — Medication 81 MILLIGRAM(S): at 13:23

## 2022-11-27 RX ADMIN — Medication 1 TABLET(S): at 13:23

## 2022-11-27 RX ADMIN — Medication 1 MILLIGRAM(S): at 13:23

## 2022-11-27 RX ADMIN — Medication 60 MILLIGRAM(S): at 21:31

## 2022-11-27 RX ADMIN — Medication 25 MILLIGRAM(S): at 05:42

## 2022-11-27 RX ADMIN — ARIPIPRAZOLE 2 MILLIGRAM(S): 15 TABLET ORAL at 13:23

## 2022-11-27 RX ADMIN — Medication 100 MILLIGRAM(S): at 13:23

## 2022-11-27 RX ADMIN — ATORVASTATIN CALCIUM 80 MILLIGRAM(S): 80 TABLET, FILM COATED ORAL at 21:32

## 2022-11-27 NOTE — PROGRESS NOTE ADULT - ASSESSMENT
Patient 61 year old female with history of Smoking, CAD s/p PCI, HTN, HLD, COPD, Depression, Anxiety and Alcoholism presented with right sided weakness and numbness. She was Code Stroke in ER. CT showed + defect, M2 L MCA occlusion/Stenosis. She was given tPA and was admitted to ICU. She was started on Cardene Infusion for BP management and it has been weaned off. Symptoms are improving. Repeat CT Head with no hemorrhage and she was subsequently downgraded to the hospitalist service.    1) Acute Left MCA CVA s/p tPA  - symptoms resolved s/p tpa  - LDL 78  - HbA1c 5.7  - Continue Aspirin, Plavix and Lipitor   - MRI Brain and CTH reviewed  - TTE with EF 40-45%, Grade I DD, WMA (similar to prior)  - PMR - home  - ABA on Monday to exclude PFO or CHRISTIANA thrombus and if negative will proceed with ILR  - Neuro and cardio recs appreciated      2) Smoking  - Cessation counselling provided  - Nicotine Patch     3) CAD    - Denies anginal symptoms  - Continue Aspirin, Plavix, Lipitor and Metoprolol   - ABA on Monday  - cardio recs appreciated    4) Alcoholism  - CIWA Protocol  - taper librium to 25 TID today  - continue symptom triggered Ativan  - Thiamine for suspected deficiency, MVI and Folic Acid   - Alcohol cessation discussed    5) Depression / Anxiety   - Continue Prozac and Abilify   - continue Vistaril TID PRN  - Psych Consult appreciated    6) Glucose Intolerance   - HbA1c 5.7  - Accu checks and ISS  - ADA diet     7) HTN  -BP stable  -continue metoprolol and lisinopril   -low sodium diet    8) HLD  -continue statin    9) History of CAD s/p PCI  -denies anginal symptoms  -TTE as before; small segmental WMA similar to prior  -continue cardiac medications  -telemonitoring  -cardio recs appreciated    DVT Prophylaxis - Lovenox SQ    Dispo: Discharge home on Monday after ABA/ILR if cleared by neuro/cardio.    Plan discussed with patient, RN

## 2022-11-28 ENCOUNTER — TRANSCRIPTION ENCOUNTER (OUTPATIENT)
Age: 57
End: 2022-11-28

## 2022-11-28 VITALS
DIASTOLIC BLOOD PRESSURE: 82 MMHG | TEMPERATURE: 97 F | SYSTOLIC BLOOD PRESSURE: 129 MMHG | HEART RATE: 79 BPM | RESPIRATION RATE: 18 BRPM | OXYGEN SATURATION: 97 %

## 2022-11-28 LAB
ANION GAP SERPL CALC-SCNC: 15 MMOL/L — SIGNIFICANT CHANGE UP (ref 5–17)
BUN SERPL-MCNC: 21.1 MG/DL — HIGH (ref 8–20)
CALCIUM SERPL-MCNC: 9.3 MG/DL — SIGNIFICANT CHANGE UP (ref 8.4–10.5)
CHLORIDE SERPL-SCNC: 102 MMOL/L — SIGNIFICANT CHANGE UP (ref 96–108)
CO2 SERPL-SCNC: 20 MMOL/L — LOW (ref 22–29)
CREAT SERPL-MCNC: 0.66 MG/DL — SIGNIFICANT CHANGE UP (ref 0.5–1.3)
EGFR: 99 ML/MIN/1.73M2 — SIGNIFICANT CHANGE UP
GLUCOSE SERPL-MCNC: 115 MG/DL — HIGH (ref 70–99)
POTASSIUM SERPL-MCNC: 4.3 MMOL/L — SIGNIFICANT CHANGE UP (ref 3.5–5.3)
POTASSIUM SERPL-SCNC: 4.3 MMOL/L — SIGNIFICANT CHANGE UP (ref 3.5–5.3)
SODIUM SERPL-SCNC: 137 MMOL/L — SIGNIFICANT CHANGE UP (ref 135–145)

## 2022-11-28 PROCEDURE — 93312 ECHO TRANSESOPHAGEAL: CPT | Mod: 26

## 2022-11-28 PROCEDURE — 99233 SBSQ HOSP IP/OBS HIGH 50: CPT

## 2022-11-28 PROCEDURE — 33285 INSJ SUBQ CAR RHYTHM MNTR: CPT

## 2022-11-28 PROCEDURE — 93010 ELECTROCARDIOGRAM REPORT: CPT

## 2022-11-28 PROCEDURE — 99239 HOSP IP/OBS DSCHRG MGMT >30: CPT

## 2022-11-28 PROCEDURE — 99232 SBSQ HOSP IP/OBS MODERATE 35: CPT

## 2022-11-28 PROCEDURE — 93325 DOPPLER ECHO COLOR FLOW MAPG: CPT | Mod: 26

## 2022-11-28 PROCEDURE — 93320 DOPPLER ECHO COMPLETE: CPT | Mod: 26

## 2022-11-28 RX ORDER — METOPROLOL TARTRATE 50 MG
1 TABLET ORAL
Qty: 60 | Refills: 0
Start: 2022-11-28 | End: 2022-12-27

## 2022-11-28 RX ORDER — FOLIC ACID 0.8 MG
1 TABLET ORAL
Qty: 0 | Refills: 0 | DISCHARGE
Start: 2022-11-28

## 2022-11-28 RX ORDER — CEFAZOLIN SODIUM 1 G
2000 VIAL (EA) INJECTION ONCE
Refills: 0 | Status: DISCONTINUED | OUTPATIENT
Start: 2022-11-28 | End: 2022-11-28

## 2022-11-28 RX ORDER — LISINOPRIL 2.5 MG/1
1 TABLET ORAL
Qty: 30 | Refills: 0
Start: 2022-11-28 | End: 2022-12-27

## 2022-11-28 RX ORDER — THIAMINE MONONITRATE (VIT B1) 100 MG
1 TABLET ORAL
Qty: 0 | Refills: 0 | DISCHARGE
Start: 2022-11-28

## 2022-11-28 RX ADMIN — LISINOPRIL 10 MILLIGRAM(S): 2.5 TABLET ORAL at 05:28

## 2022-11-28 RX ADMIN — Medication 25 MILLIGRAM(S): at 05:27

## 2022-11-28 RX ADMIN — Medication 1 MILLIGRAM(S): at 15:22

## 2022-11-28 RX ADMIN — Medication 1 TABLET(S): at 15:21

## 2022-11-28 RX ADMIN — Medication 25 MILLIGRAM(S): at 17:41

## 2022-11-28 RX ADMIN — ENOXAPARIN SODIUM 40 MILLIGRAM(S): 100 INJECTION SUBCUTANEOUS at 17:42

## 2022-11-28 RX ADMIN — CLOPIDOGREL BISULFATE 75 MILLIGRAM(S): 75 TABLET, FILM COATED ORAL at 15:22

## 2022-11-28 RX ADMIN — ARIPIPRAZOLE 2 MILLIGRAM(S): 15 TABLET ORAL at 15:23

## 2022-11-28 RX ADMIN — Medication 81 MILLIGRAM(S): at 15:21

## 2022-11-28 RX ADMIN — Medication 100 MILLIGRAM(S): at 15:22

## 2022-11-28 RX ADMIN — Medication 25 MILLIGRAM(S): at 15:22

## 2022-11-28 NOTE — SBIRT NOTE ADULT - NSSBIRTALCPOSREINDET_GEN_A_CORE
Pt reports she is motivating to stop drinking on her own. Pt plans to f.u for mental health treatment at [facility] on 2/3 and will also discuss etoh abuse with them as well. Resources provided.

## 2022-11-28 NOTE — DISCHARGE NOTE NURSING/CASE MANAGEMENT/SOCIAL WORK - NSDCCRTYPESERV_GEN_ALL_CORE_FT
Mental health f/u appointment: Friday 11/2 at 11:30 am they will contact you to coordinate the details of the appointment prior to

## 2022-11-28 NOTE — PROGRESS NOTE ADULT - SUBJECTIVE AND OBJECTIVE BOX
Department of Cardiology                                                                  [facility]/[place] [address] [address] [facility]                                                            Telephone: [phone]. Fax:[phone]                                                                         Post-Procedure Note: ABA      Narrative:  s/p ABA, tolerated well. Negative for cardioembolic source, no PFO.      MEDICATIONS:  lisinopril 10 milliGRAM(s) Oral daily  metoprolol tartrate 25 milliGRAM(s) Oral two times a day  acetaminophen     Tablet .. 650 milliGRAM(s) Oral every 6 hours PRN  ARIPiprazole 2 milliGRAM(s) Oral daily  chlordiazePOXIDE 25 milliGRAM(s) Oral every 8 hours  FLUoxetine 60 milliGRAM(s) Oral at bedtime  hydrOXYzine hydrochloride 50 milliGRAM(s) Oral three times a day PRN  LORazepam   Injectable 2 milliGRAM(s) IV Push every 1 hour PRN  ondansetron Injectable 4 milliGRAM(s) IV Push every 6 hours PRN  polyethylene glycol 3350 17 Gram(s) Oral daily  senna 2 Tablet(s) Oral at bedtime  atorvastatin 80 milliGRAM(s) Oral at bedtime  aspirin  chewable 81 milliGRAM(s) Oral daily  clopidogrel Tablet 75 milliGRAM(s) Oral daily  enoxaparin Injectable 40 milliGRAM(s) SubCutaneous <User Schedule>  folic acid 1 milliGRAM(s) Oral daily  influenza   Vaccine 0.5 milliLiter(s) IntraMuscular once  multivitamin 1 Tablet(s) Oral daily  thiamine 100 milliGRAM(s) Oral daily        PHYSICAL EXAM:    T(C): 36.4 (11-28-22 @ 11:16), Max: 36.9 (11-27-22 @ 17:33)  HR: 69 (11-28-22 @ 11:16) (67 - 82)  BP: 117/78 (11-28-22 @ 11:16) (103/68 - 132/88)  RR: 18 (11-28-22 @ 11:16) (16 - 18)  SpO2: 97% (11-28-22 @ 11:16) (94% - 97%)  Wt(kg): --    I&O's Summary      Daily     Daily     Constitutional: A & O x 3  HEENT:   Normal oral mucosa, PERRL, EOMI	  Cardiovascular: Normal S1 S2, No JVD, No murmurs, No edema  Respiratory: Lungs clear to auscultation	  Gastrointestinal:  Soft, Non-tender, + BS	  Skin: No rashes, No ecchymoses, No cyanosis  Neurologic: Non-focal  Extremities: Normal range of motion, No clubbing, cyanosis or edema  Vascular: Peripheral pulses palpable 2+ bilaterally        ASSESSMENT: negative ABA for cardioembolic source, now for ILR.    -Post ABA orders per protocol  -Post ABA montioring per protocol  -Formal ABA report pending  -ILR

## 2022-11-28 NOTE — PROGRESS NOTE ADULT - NS ATTEND AMEND GEN_ALL_CORE FT
Agree with PA's assessment and plan.  - MRI brain pending  - ABA and Loop recorder planned for money  - DAPT and stain  - outpatient neurology follow up in 4 weeks
Agree with PA's assesment and plan.  60 YO F w/left MCA syndrome received tPA at 924am with complete neurological recovery. Today pt is at baseline. repeat CT head reviewed, no active hemorrhage appreciated.  - recommend MRI brain when possible  - 2D echo  - dual antiplatelet therapy for 3 months  - high intensity statin  - PT/OT/Speech eval  - consult behavioral health for depression - pt reports being depressed, she is also abusing ETOH  discussed above with pt, will follow up
1)  CVA:  on asa and plavix. n for ABA and Loop monitor today. Smoking cessation strongly advised.  2)  Isc CM:  Mildy reduced EF   (known EF ~45%) Cont GDMT.

## 2022-11-28 NOTE — PROGRESS NOTE ADULT - SUBJECTIVE AND OBJECTIVE BOX
57 year old female with PMH of HTN, depression, COPD, CAD s/p PCI, cardiomyopathy LVEF 45%, anxiety, and ETOH dependency who presented to [facility] w/ right sided weakness and slurred speech. CT showed + defect, M2 L MCA occlusion/Stenosis; s/p TPA. ABA on 11/28/22 was negative for cardioembolic source for CVA. Now s/p uncomplicated ILR implant.     Procedure: Loop Recorder Implant:   Electrophysiologist:     Pt doing well s/p loop recorder implant. Denies complaint post procedure.     Incision: Steristrips C/D/I; no bleeding, hematoma, erythema, exudate or edema    Plan:   Loop Recorder Incision Care:     - Do not touch the incision until it is completely healed.   - There is Dermabond (skin glue) and/or Steristrips on your incision, which will start to flake off on its own over the next 2-3 weeks. Do not pick at or peel off the Dermabond and /or Steristrips.    - Do not apply soaps, creams, lotions, ointments or powders to the incision until it is completely healed.  - You should call the doctor if you notice redness, drainage, swelling, increased tenderness, hot sensation around the  incision, bleeding or incision edges pulling apart.  - Pain control with PO analgesia PRN.   - Outpatient follow up with Dr. Terrazas in 2-3 weeks.      Procedure: Loop Recorder Implant  Electrophysiologist: Hubert Terrazas     Pt doing well s/p loop recorder implant. Denies complaint post procedure.     Incision: Steristrips C/D/I; no bleeding, hematoma, erythema, exudate or edema    57 year old female with PMH of HTN, depression, COPD, CAD s/p PCI, cardiomyopathy LVEF 45%, anxiety, and ETOH dependency who presented to [facility] w/ right sided weakness and slurred speech. CT showed + defect, M2 L MCA occlusion/Stenosis; s/p TPA. ABA on 11/28/22 was negative for cardioembolic source for CVA. Now s/p uncomplicated ILR implant.     Plan:   Loop Recorder Incision Care:     - Do not touch the incision until it is completely healed.   - There is Dermabond (skin glue) and/or Steristrips on your incision, which will start to flake off on its own over the next 2-3 weeks. Do not pick at or peel off the Dermabond and /or Steristrips.    - Do not apply soaps, creams, lotions, ointments or powders to the incision until it is completely healed.  - You should call the doctor if you notice redness, drainage, swelling, increased tenderness, hot sensation around the  incision, bleeding or incision edges pulling apart.  - Pain control with PO analgesia PRN.   - Outpatient follow up with Dr. Terrazas in 2-3 weeks.

## 2022-11-28 NOTE — PROGRESS NOTE ADULT - SUBJECTIVE AND OBJECTIVE BOX
Department of Cardiology                                                                  [facility]/[place] [address] [address] [facility]                                                            Telephone: [phone]. Fax:[phone]                                                                                     Pre-ABA Note        Narrative:  62 y/o female smoker with alchoholism (at least bottle of wine per day and 3+ glass vodka) , HTN Depression, Copd s/p Stemi  s/p iwmi and stent to RCA and circ on plavix and asa and mildly reduced EF 45%  who presented with  a Stroke Code. Acute onset of slurred speech, Rt sided weakness and numbness noted by Pt's friend while getting into a car around 730AM after shopping at store. In ED, NIHSS 13. CTA H/N / Brain and Perfusion + defect, M2 L MCA occlusion/Stenosis Given LWK < 3.5 hr, within window for TPA. TPa given -> Labs w/ WBC 11. SBP > 180, Cardene gtt started for BP control. Here for ABA to exclude CHRISTIANA thrombus, PFO and aortic arch atheroma, if no significant findings with cryptogenic CVA then ILR post ABA prior to discharge.    ASA and Mallampati: Per Anesthesia    	  MEDICATIONS:  lisinopril 10 milliGRAM(s) Oral daily  metoprolol tartrate 25 milliGRAM(s) Oral two times a day  acetaminophen     Tablet .. 650 milliGRAM(s) Oral every 6 hours PRN  ARIPiprazole 2 milliGRAM(s) Oral daily  chlordiazePOXIDE 25 milliGRAM(s) Oral every 8 hours  FLUoxetine 60 milliGRAM(s) Oral at bedtime  hydrOXYzine hydrochloride 50 milliGRAM(s) Oral three times a day PRN  LORazepam   Injectable 2 milliGRAM(s) IV Push every 1 hour PRN  ondansetron Injectable 4 milliGRAM(s) IV Push every 6 hours PRN  polyethylene glycol 3350 17 Gram(s) Oral daily  senna 2 Tablet(s) Oral at bedtime  atorvastatin 80 milliGRAM(s) Oral at bedtime  aspirin  chewable 81 milliGRAM(s) Oral daily  clopidogrel Tablet 75 milliGRAM(s) Oral daily  enoxaparin Injectable 40 milliGRAM(s) SubCutaneous <User Schedule>  folic acid 1 milliGRAM(s) Oral daily  influenza   Vaccine 0.5 milliLiter(s) IntraMuscular once  multivitamin 1 Tablet(s) Oral daily  thiamine 100 milliGRAM(s) Oral daily        PHYSICAL EXAM:    T(C): 36.4 (11-28-22 @ 11:16), Max: 36.9 (11-27-22 @ 17:33)  HR: 69 (11-28-22 @ 11:16) (67 - 82)  BP: 117/78 (11-28-22 @ 11:16) (103/68 - 132/88)  RR: 18 (11-28-22 @ 11:16) (16 - 18)  SpO2: 97% (11-28-22 @ 11:16) (94% - 97%)  Wt(kg): --    I&O's Summary      Daily     Daily     Constitutional: A & O x 3  HEENT:   Normal oral mucosa, PERRL, EOMI	  Cardiovascular: Normal S1 S2, No JVD, No murmurs, No edema  Respiratory: Lungs clear to auscultation	  Gastrointestinal:  Soft, Non-tender, + BS	  Skin: No rashes, No ecchymoses, No cyanosis  Neurologic: Non-focal  Extremities: Normal range of motion, No clubbing, cyanosis or edema  Vascular: Peripheral pulses palpable 2+ bilaterally    TELEMETRY: 	      ECG: SR with ST abnormality 	    Diagnsotics: ECHO 11/23/22    Summary:   1. Left ventricular ejection fraction, by visual estimation, is 40 to   45%.   2. Basal and mid inferior wall and basal and mid inferolateral wall are   abnormal as described above.   3. Spectral Doppler shows impaired relaxation pattern of left   ventricular myocardial filling (Grade I diastolic dysfunction).   4. There is mild left ventricular hypertrophy.   5. Mildly enlarged right atrium.   6. There is no evidence of pericardial effusion.   7. Mild mitral annular calcification.   8. Mild mitral valve regurgitation.   9. Mild thickening of the anterior and posterior mitral valve leaflets.  10. Mild tricuspid regurgitation.  11. Insufficient TR to accurately assess RVSP.  12. Sclerotic aortic valve with normal opening.      LABS:	 	    CARDIAC MARKERS:        11-28    137  |  102  |  21.1<H>  ----------------------------<  115<H>  4.3   |  20.0<L>  |  0.66    Ca    9.3      28 Nov 2022 07:10  Phos  4.2     11-27  Mg     2.0     11-27    TPro  7.7  /  Alb  4.5  /  TBili  0.3<L>  /  DBili  0.1  /  AST  36<H>  /  ALT  28  /  AlkPhos  159<H>  11-26    proBNP:   Lipid Profile: , , HDL 45 LDL 78  HgA1c: 5.7  TSH:     ASSESSMENT: 56 yo female with CVA, here for ABA to r/o embolic source and if negative plan for ILR.    -ABA as ordered  -Labs and ECG reviewed  -Procedure discussed with patient; risks and benefits explained; questions answered  -Consent obtained by Echocardiographer and anesthesiologist

## 2022-11-28 NOTE — PROGRESS NOTE ADULT - ASSESSMENT
60 y/o female smoker with alchoholism (at least bottle of wine per day) , HTN Depression, Copd s/p Stemi  s/p iwmi and stent to RCA and circ on plavix and asa and mildly reduced EF 45%  who presented with  a Stroke Code. Acute onset of slurred speech, Rt sided weakness and numbness noted by Pt's friend while getting into a car around 730AM after shopping at store. No HA or dizziness. No SOB or fevers  Symptoms have totally improved.   In ED, NIHSS 13. CTA H/N / Brain and Perfusion + defect, M2 L MCA occlusion/Stenosis Given LWK < 3.5 hr, within window for TPA. TPa given -> Labs w/ WBC 11. SBP > 180, Cardene gtt started for BP control. Scheduled ABA on 11/25 to exclude CHRISTIANA thrombus, PFO and aortic arch atheroma, if no significant findings with cryptogenic CVA then ILR post ABA prior to discharge.

## 2022-11-28 NOTE — DISCHARGE NOTE NURSING/CASE MANAGEMENT/SOCIAL WORK - NSDCFUADDAPPT_GEN_ALL_CORE_FT
You have a prescheduled appointment with your PCP, Dr. Dr. Bradley, on 12/7/22 at 2:00pm. Please call the office at [phone] if you need to reschedule.

## 2022-11-28 NOTE — PROGRESS NOTE ADULT - SUBJECTIVE AND OBJECTIVE BOX
[facility]                                                         CARDIOLOGY AT [facility]                                                                  [address] [address]                                                         Telephone: [phone]. Fax:[phone]                                                                             PROGRESS NOTE    Reason for follow up: CVA Scheduled ABA today   Update:       Review of symptoms:   Cardiac:  No chest pain. No dyspnea. No palpitations.  Respiratory: no cough. No dyspnea  Gastrointestinal: No diarrhea. No abdominal pain. No bleeding.   Neuro: No focal neuro complaints.    Vitals:  T(C): 36.6 (11-28-22 @ 04:59), Max: 36.9 (11-27-22 @ 17:33)  HR: 79 (11-28-22 @ 04:59) (71 - 82)  BP: 129/85 (11-28-22 @ 04:59) (103/68 - 132/88)  RR: 18 (11-28-22 @ 04:59) (18 - 18)  SpO2: 94% (11-28-22 @ 04:59) (94% - 95%)  Wt(kg): --  I&O's Summary    Weight (kg): 58.3 (11-23 @ 08:50)    PHYSICAL EXAM:  Appearance: Comfortable. No acute distress  HEENT:  Atraumatic. Normocephalic.  Normal oral mucosa  Neurologic: A & O x 3, no gross focal deficits.  Cardiovascular: RRR S1 S2, No murmur, no rubs/gallops. No JVD  Respiratory: Lungs clear to auscultation, unlabored   Gastrointestinal:  Soft, Non-tender, + BS  Lower Extremities: 2+ Peripheral Pulses, No clubbing, cyanosis, or edema  Psychiatry: Patient is calm. No agitation.   Skin: warm and dry.    CURRENT CARDIAC MEDICATIONS:  lisinopril 10 milliGRAM(s) Oral daily  metoprolol tartrate 25 milliGRAM(s) Oral two times a day      CURRENT OTHER MEDICATIONS:  acetaminophen     Tablet .. 650 milliGRAM(s) Oral every 6 hours PRN Temp greater or equal to 38C (100.4F), Mild Pain (1 - 3), Moderate Pain (4 - 6)  ARIPiprazole 2 milliGRAM(s) Oral daily  chlordiazePOXIDE 25 milliGRAM(s) Oral every 8 hours  FLUoxetine 60 milliGRAM(s) Oral at bedtime  hydrOXYzine hydrochloride 50 milliGRAM(s) Oral three times a day PRN anixety  LORazepam   Injectable 2 milliGRAM(s) IV Push every 1 hour PRN Symptom-triggered: each CIWA -Ar score 8 or GREATER  ondansetron Injectable 4 milliGRAM(s) IV Push every 6 hours PRN Nausea and/or Vomiting  polyethylene glycol 3350 17 Gram(s) Oral daily  senna 2 Tablet(s) Oral at bedtime  atorvastatin 80 milliGRAM(s) Oral at bedtime  aspirin  chewable 81 milliGRAM(s) Oral daily  clopidogrel Tablet 75 milliGRAM(s) Oral daily  enoxaparin Injectable 40 milliGRAM(s) SubCutaneous <User Schedule>  folic acid 1 milliGRAM(s) Oral daily  influenza   Vaccine 0.5 milliLiter(s) IntraMuscular once  multivitamin 1 Tablet(s) Oral daily  thiamine 100 milliGRAM(s) Oral daily      LABS:	 	  ( 23 Nov 2022 08:55 )  Troponin T  <0.01,  CPK  X    , CKMB  X    , BNP X              11-27    139  |  102  |  20.9<H>  ----------------------------<  112<H>  4.5   |  20.0<L>  |  0.72    Ca    9.8      27 Nov 2022 05:40  Phos  4.2     11-27  Mg     2.0     11-27    TPro  7.7  /  Alb  4.5  /  TBili  0.3<L>  /  DBili  0.1  /  AST  36<H>  /  ALT  28  /  AlkPhos  159<H>  11-26    PT/INR/PTT ( 23 Nov 2022 08:55 )                       :                       :      12.6         :       51.5                  .        .                   .              .           .       1.09        .                                       Lipid Profile: Date: 11-24 @ 05:15  Total cholesterol 146; Direct LDL: --; HDL: 45; Triglycerides:117    HgA1c:   TSH:     TELEMETRY:   ECG:  < from: 12 Lead ECG (11.25.22 @ 13:29) >  entricular Rate 66 BPM    Atrial Rate 66 BPM    P-R Interval 144 ms    QRS Duration 100 ms    Q-T Interval 488 ms    QTC Calculation(Bazett) 511 ms    P Axis 41 degrees    R Axis 76 degrees    T Axis 58 degrees    Diagnosis Line Normal sinus rhythm  Septal infarct , age undetermined  Prolonged QT    Confirmed by CULLEN CONWAY ([id number]) on 11/27/2022 6:21:23 PM    < end of copied text >      DIAGNOSTIC TESTING:  [ ] Echocardiogram:   < from: TTE Echo Complete w/ Contrast w/ Doppler (11.23.22 @ 18:54) >  PHYSICIAN INTERPRETATION:  Left Ventricle: The left ventricular internal cavity size is normal.   There is mild left ventricular hypertrophy involving the septal wall. The   LVH involves septal walls.  Left ventricular ejection fraction, by visual estimation, is 40 to 45%.   Spectral Doppler shows impaired relaxation pattern of left ventricular   myocardial filling (Grade I diastolic dysfunction). Normal LV filling   pressures.      LV Wall Scoring:  The basal and mid inferior wall and basal and mid inferolateral wall are  hypokinetic.    Right Ventricle: Normal right ventricular size and function. TV S' 0.2   m/s.  Left Atrium: The left atrium is normal in size.  Right Atrium: Mildly enlarged right atrium.  Pericardium: There is no evidence of pericardial effusion.  Mitral Valve: Mild thickening of the anterior and posterior mitral valve   leaflets. There is mild mitral annular calcification. No evidence of   mitral stenosis. Mild mitral valve regurgitation is seen.  Tricuspid Valve: Mild tricuspid regurgitation is visualized. The flow in   the hepatic veins is normal during ventricular systole. Insufficient TR   to accurately assess RVSP.  Aortic Valve: The aortic valve is trileaflet. No evidence of aortic   stenosis. Sclerotic aortic valve with normal opening. Trivial aortic   valve regurgitation is seen.  Pulmonic Valve: The pulmonic valve was not well visualized. No indication   of pulmonic valve regurgitation.  Aorta: The aortic root and ascending aorta are structurally normal, with   no evidence of dilitation.  Pulmonary Artery: The main pulmonary artery is normal in size.  Venous: The inferior vena cava is normal. The inferior vena cava was   normal sized, with respiratory size variation greater than 50%.      Summary:   1. Left ventricular ejection fraction, by visual estimation, is 40 to   45%.   2. Basal and mid inferior wall and basal and mid inferolateral wall are   abnormal as described above.   3. Spectral Doppler shows impaired relaxation pattern of left   ventricular myocardial filling (Grade I diastolic dysfunction).   4. There is mild left ventricular hypertrophy.   5. Mildly enlarged right atrium.   6. There is no evidence of pericardial effusion.   7. Mild mitral annular calcification.   8. Mild mitral valve regurgitation.        [ ]  Catheterization:      [ ] Stress Test:     	                                                                [facility]                                                         CARDIOLOGY AT [facility]                                                                  [address], [place] [address]                                                         Telephone: [phone]. Fax:[phone]                                                                             PROGRESS NOTE    Reason for follow up: CVA Scheduled ABA today   Update: Patient without acute events overnight remains NPO for ABA, mild anxiety noted      Review of symptoms:   Cardiac:  No chest pain. + dyspnea. No palpitations.  Respiratory: no cough.  +dyspnea states COPD   Gastrointestinal: No diarrhea. No abdominal pain. No bleeding.   Neuro: No focal neuro complaints.    Vitals:  T(C): 36.6 (11-28-22 @ 04:59), Max: 36.9 (11-27-22 @ 17:33)  HR: 79 (11-28-22 @ 04:59) (71 - 82)  BP: 129/85 (11-28-22 @ 04:59) (103/68 - 132/88)  RR: 18 (11-28-22 @ 04:59) (18 - 18)  SpO2: 94% (11-28-22 @ 04:59) (94% - 95%)  Wt(kg): --  I&O's Summary    Weight (kg): 58.3 (11-23 @ 08:50)    PHYSICAL EXAM:  Appearance: Comfortable. No acute distress  HEENT:  Atraumatic. Normocephalic.  Normal oral mucosa  Neurologic: A & O x 3, no gross focal deficits.  Cardiovascular: RRR S1 S2, No murmur, no rubs/gallops. No JVD  Respiratory: Lungs clear to auscultation, unlabored   Gastrointestinal:  Soft, Non-tender, + BS  Lower Extremities: 2+ Peripheral Pulses, No clubbing, cyanosis, or edema  Psychiatry: Patient is mildly anxious . No agitation.   Skin: warm and dry.    CURRENT CARDIAC MEDICATIONS:  lisinopril 10 milliGRAM(s) Oral daily  metoprolol tartrate 25 milliGRAM(s) Oral two times a day      CURRENT OTHER MEDICATIONS:  acetaminophen     Tablet .. 650 milliGRAM(s) Oral every 6 hours PRN Temp greater or equal to 38C (100.4F), Mild Pain (1 - 3), Moderate Pain (4 - 6)  ARIPiprazole 2 milliGRAM(s) Oral daily  chlordiazePOXIDE 25 milliGRAM(s) Oral every 8 hours  FLUoxetine 60 milliGRAM(s) Oral at bedtime  hydrOXYzine hydrochloride 50 milliGRAM(s) Oral three times a day PRN anixety  LORazepam   Injectable 2 milliGRAM(s) IV Push every 1 hour PRN Symptom-triggered: each CIWA -Ar score 8 or GREATER  ondansetron Injectable 4 milliGRAM(s) IV Push every 6 hours PRN Nausea and/or Vomiting  polyethylene glycol 3350 17 Gram(s) Oral daily  senna 2 Tablet(s) Oral at bedtime  atorvastatin 80 milliGRAM(s) Oral at bedtime  aspirin  chewable 81 milliGRAM(s) Oral daily  clopidogrel Tablet 75 milliGRAM(s) Oral daily  enoxaparin Injectable 40 milliGRAM(s) SubCutaneous <User Schedule>  folic acid 1 milliGRAM(s) Oral daily  influenza   Vaccine 0.5 milliLiter(s) IntraMuscular once  multivitamin 1 Tablet(s) Oral daily  thiamine 100 milliGRAM(s) Oral daily      LABS:	 	  ( 23 Nov 2022 08:55 )  Troponin T  <0.01,  CPK  X    , CKMB  X    , BNP X              11-27    139  |  102  |  20.9<H>  ----------------------------<  112<H>  4.5   |  20.0<L>  |  0.72    Ca    9.8      27 Nov 2022 05:40  Phos  4.2     11-27  Mg     2.0     11-27    TPro  7.7  /  Alb  4.5  /  TBili  0.3<L>  /  DBili  0.1  /  AST  36<H>  /  ALT  28  /  AlkPhos  159<H>  11-26    PT/INR/PTT ( 23 Nov 2022 08:55 )                       :                       :      12.6         :       51.5                  .        .                   .              .           .       1.09        .                                       Lipid Profile: Date: 11-24 @ 05:15  Total cholesterol 146; Direct LDL: --; HDL: 45; Triglycerides:117    HgA1c:   TSH:     TELEMETRY:   ECG:  < from: 12 Lead ECG (11.25.22 @ 13:29) >  entricular Rate 66 BPM    Atrial Rate 66 BPM    P-R Interval 144 ms    QRS Duration 100 ms    Q-T Interval 488 ms    QTC Calculation(Bazett) 511 ms    P Axis 41 degrees    R Axis 76 degrees    T Axis 58 degrees    Diagnosis Line Normal sinus rhythm  Septal infarct , age undetermined  Prolonged QT    Confirmed by CULLEN CONWAY ([id number]) on 11/27/2022 6:21:23 PM    < end of copied text >      DIAGNOSTIC TESTING:  [ ] Echocardiogram:   < from: TTE Echo Complete w/ Contrast w/ Doppler (11.23.22 @ 18:54) >  PHYSICIAN INTERPRETATION:  Left Ventricle: The left ventricular internal cavity size is normal.   There is mild left ventricular hypertrophy involving the septal wall. The   LVH involves septal walls.  Left ventricular ejection fraction, by visual estimation, is 40 to 45%.   Spectral Doppler shows impaired relaxation pattern of left ventricular   myocardial filling (Grade I diastolic dysfunction). Normal LV filling   pressures.      LV Wall Scoring:  The basal and mid inferior wall and basal and mid inferolateral wall are  hypokinetic.    Right Ventricle: Normal right ventricular size and function. TV S' 0.2   m/s.  Left Atrium: The left atrium is normal in size.  Right Atrium: Mildly enlarged right atrium.  Pericardium: There is no evidence of pericardial effusion.  Mitral Valve: Mild thickening of the anterior and posterior mitral valve   leaflets. There is mild mitral annular calcification. No evidence of   mitral stenosis. Mild mitral valve regurgitation is seen.  Tricuspid Valve: Mild tricuspid regurgitation is visualized. The flow in   the hepatic veins is normal during ventricular systole. Insufficient TR   to accurately assess RVSP.  Aortic Valve: The aortic valve is trileaflet. No evidence of aortic   stenosis. Sclerotic aortic valve with normal opening. Trivial aortic   valve regurgitation is seen.  Pulmonic Valve: The pulmonic valve was not well visualized. No indication   of pulmonic valve regurgitation.  Aorta: The aortic root and ascending aorta are structurally normal, with   no evidence of dilitation.  Pulmonary Artery: The main pulmonary artery is normal in size.  Venous: The inferior vena cava is normal. The inferior vena cava was   normal sized, with respiratory size variation greater than 50%.      Summary:   1. Left ventricular ejection fraction, by visual estimation, is 40 to   45%.   2. Basal and mid inferior wall and basal and mid inferolateral wall are   abnormal as described above.   3. Spectral Doppler shows impaired relaxation pattern of left   ventricular myocardial filling (Grade I diastolic dysfunction).   4. There is mild left ventricular hypertrophy.   5. Mildly enlarged right atrium.   6. There is no evidence of pericardial effusion.   7. Mild mitral annular calcification.   8. Mild mitral valve regurgitation.

## 2022-11-28 NOTE — DISCHARGE NOTE NURSING/CASE MANAGEMENT/SOCIAL WORK - PATIENT PORTAL LINK FT
You can access the FollowMyHealth Patient Portal offered by [facility] by registering at the following website: [email or web address]. By joining [employer]’s FollowMyHealth portal, you will also be able to view your health information using other applications (apps) compatible with our system.

## 2022-11-28 NOTE — DISCHARGE NOTE NURSING/CASE MANAGEMENT/SOCIAL WORK - NSDCPEFALRISK_GEN_ALL_CORE
For information on Fall & Injury Prevention, visit: [email or web address] OR  [email or web address] OR  https://www.cdc.gov/steadi/patient.html

## 2022-11-28 NOTE — PROGRESS NOTE ADULT - SUBJECTIVE AND OBJECTIVE BOX
Patient feels well.  Pending ABA and ILR.  Reports her right hand is still off when performing fine motor.    REVIEW OF SYSTEMS  Constitutional - No fever,  No fatigue  Neurological - No headaches, No memory loss, +loss of strength, +numbness, +tremors  Musculoskeletal - No joint pain, No joint swelling, No muscle pain    FUNCTIONAL PROGRESS  11/25 OT  Progress Summary: Chart reviewed, contents noted. Pt observed ambulating independently in the room and hallways, reporting she is at her physical baseline. Spoke with JULIETA Hoover who confirms patient is independent for all ADLs/self care and ambulation without the need for assistance or use of an AD. MD aware. OT dept will sign off at this time. Please reconsult if indicated.     VITALS  T(C): 36.6 (11-28-22 @ 04:59), Max: 36.9 (11-27-22 @ 17:33)  HR: 79 (11-28-22 @ 04:59) (71 - 82)  BP: 129/85 (11-28-22 @ 04:59) (103/68 - 132/88)  RR: 18 (11-28-22 @ 04:59) (18 - 18)  SpO2: 94% (11-28-22 @ 04:59) (94% - 95%)  Wt(kg): --    MEDICATIONS   acetaminophen     Tablet .. 650 milliGRAM(s) every 6 hours PRN  ARIPiprazole 2 milliGRAM(s) daily  aspirin  chewable 81 milliGRAM(s) daily  atorvastatin 80 milliGRAM(s) at bedtime  chlordiazePOXIDE 25 milliGRAM(s) every 8 hours  clopidogrel Tablet 75 milliGRAM(s) daily  enoxaparin Injectable 40 milliGRAM(s) <User Schedule>  FLUoxetine 60 milliGRAM(s) at bedtime  folic acid 1 milliGRAM(s) daily  hydrOXYzine hydrochloride 50 milliGRAM(s) three times a day PRN  influenza   Vaccine 0.5 milliLiter(s) once  lisinopril 10 milliGRAM(s) daily  LORazepam   Injectable 2 milliGRAM(s) every 1 hour PRN  metoprolol tartrate 25 milliGRAM(s) two times a day  multivitamin 1 Tablet(s) daily  ondansetron Injectable 4 milliGRAM(s) every 6 hours PRN  polyethylene glycol 3350 17 Gram(s) daily  senna 2 Tablet(s) at bedtime  thiamine 100 milliGRAM(s) daily      RECENT LABS/IMAGING      11-28    137  |  102  |  21.1<H>  ----------------------------<  115<H>  4.3   |  20.0<L>  |  0.66    Ca    9.3      28 Nov 2022 07:10  Phos  4.2     11-27  Mg     2.0     11-27    TPro  7.7  /  Alb  4.5  /  TBili  0.3<L>  /  DBili  0.1  /  AST  36<H>  /  ALT  28  /  AlkPhos  159<H>  11-26                HEAD CT 11/24 - No acute intracranial hemorrhage, mass effect, or shift of the midline structures. Similar-appearing mild chronic white matter microvascular type changes.    CT PERFUSION 11/23 - NO EVIDENCE OF AN ACUTE INTRACRANIAL HEMORRHAGE, MIDLINE SHIFT, OR HYDROCEPHALUS. *  NO HEMODYNAMIC SIGNIFICANT NARROWING WITHIN THE NECK. *  SEVERE FOCAL NARROWING VERSUS SMALL FOCAL OCCLUSION DISTAL M2 SEGMENT POSTERIOR DIVISION LEFT MIDDLE CEREBRAL ARTERY. *  AREA OF ISCHEMIA LEFT PARIETAL LOBE WITH A VOLUME OF 27 ML, CORE   INFARCT OF 11 ML, AND PENUMBRA OF 16 ML.    MRI HEAD 11/25 - Acute posterior left MCA territory infarct.    ----------------------------------------------------------------------------------------  PHYSICAL EXAM  Constitutional - NAD, Comfortable  Extremities - No C/C/E, No calf tenderness   Neurologic Exam -                    Cognitive - AAO to self, place, date, year, situation     Communication - Fluent, Mild dysarthria     Cranial Nerves - CN 2-12 intact     FUNCTIONAL MOTOR EXAM - No focal deficits     Sensory - Slight decrease in the right UE to LT  Psychiatric - Mood stable, Affect WNL  ----------------------------------------------------------------------------------------  ASSESSMENT/PLAN  61yFemale with functional deficits after an acute CVA  Acute left CVA - ASA, Plavix  HTN - Lopressor   EtOH Use - Librium, Folate, Thiamine, MVI, Ativan PRN   Mood - Abilify, Prozac, Atarax  Pain - Tylenol  DVT PPX - SCDs, Lovenox  Rehab - Patient is independent. Recommend DC HOME with OUTPATIENT OT for fine motor.     Will sign off at this time. Thank you for allowing me to be part of your patient's care. Please reconsult PMR for additional rehab recommendations or dispo needs if functional status changes.     Discussed with rehab clinical care team.

## 2022-11-28 NOTE — PROGRESS NOTE ADULT - PROBLEM SELECTOR PLAN 1
on asa and plavix  continue for now  continue telemetry monitoring to screen for atrial fib  r/o embolic event  will plan for ABA and Loop monitor today  Smoking cessation strongly advised. on asa and plavix  continue for now  continue telemetry monitoring to screen for atrial fib no events artifact noted   r/o embolic event  will plan for ABA and Loop monitor today  Smoking cessation strongly advised.

## 2022-11-28 NOTE — PROGRESS NOTE ADULT - REASON FOR ADMISSION
Slurred speech and Rt sided Numbness/weakness

## 2022-11-28 NOTE — PROGRESS NOTE ADULT - PROVIDER SPECIALTY LIST ADULT
Rehab Medicine
Electrophysiology
Hospitalist
Hospitalist
Intervent Cardiology
Hospitalist
Intervent Cardiology
Hospitalist
Neurology
Neurology
NSICU
Cardiology

## 2022-12-01 ENCOUNTER — NON-APPOINTMENT (OUTPATIENT)
Age: 57
End: 2022-12-01

## 2022-12-01 ENCOUNTER — APPOINTMENT (OUTPATIENT)
Dept: NEUROLOGY | Facility: CLINIC | Age: 57
End: 2022-12-01
Payer: MEDICARE

## 2022-12-01 VITALS
OXYGEN SATURATION: 95 % | TEMPERATURE: 97.9 F | HEART RATE: 70 BPM | BODY MASS INDEX: 26.85 KG/M2 | SYSTOLIC BLOOD PRESSURE: 142 MMHG | WEIGHT: 157.3 LBS | HEIGHT: 64 IN | DIASTOLIC BLOOD PRESSURE: 85 MMHG

## 2022-12-01 DIAGNOSIS — Z78.9 OTHER SPECIFIED HEALTH STATUS: ICD-10-CM

## 2022-12-01 PROCEDURE — 99204 OFFICE O/P NEW MOD 45 MIN: CPT

## 2022-12-02 ENCOUNTER — RX RENEWAL (OUTPATIENT)
Age: 57
End: 2022-12-02

## 2022-12-03 PROBLEM — Z78.9 ALCOHOL USE: Status: ACTIVE | Noted: 2021-09-20

## 2022-12-03 NOTE — DISCUSSION/SUMMARY
[FreeTextEntry1] : Ms. Valle is a 57 year-old woman with PMH tobacco use, CAD s/p PCI (on ASA and Plavix), HTN, HLD, COPD, depression, anxiety and ETOH use who presented to the office today for hospital follow-up. She is one week post left MCA territory infarct, likely 2/2 ESUS. ILR in place. From a neurology stand point, she should continue DAPT for at least three months as per SAMPRISS trial. Will defer to cardiology if DAPT is required longer for CAD s/p PCI. Continue statin therapy and antihypertensive medications as ordered. Abstain from ETOH and tobacco use. Follow-up with me again in three months or sooner should the need arise. All of her questions and concerns were addressed. mRS 0. \par \par \par

## 2022-12-03 NOTE — HISTORY OF PRESENT ILLNESS
[FreeTextEntry1] : 61 year old female with history of Smoking, CAD s/p PCI, HTN, HLD, COPD, \par Depression, Anxiety and Alcoholism presented with right sided weakness and \par numbness. She was Code Stroke in ER. CT showed + defect, M2 L MCA \par occlusion/Stenosis. She was given tPA and was admitted to ICU. She was started \par on Cardene Infusion for BP management and it has been weaned off. Symptoms are \par improving. Repeat CT Head with no hemorrhage. Downgraded to Medicine. TTE with \par EF of 40-45% with wall motion abnormalities, cardiology consulted. MRI of the \par brain pending \par \par

## 2022-12-03 NOTE — PHYSICAL EXAM
[FreeTextEntry1] : GENERAL PHYSICAL EXAM:\par GEN: no distress, normal affect\par EYES: sclera white, conjunctiva clear, no nystagmus\par CV: normal rhythm\par PULM: no respiratory distress, normal rhythm and effort\par EXT: no edema, no cyanosis\par MSK: muscle tone and strength normal\par SKIN: warm, dry, no rash or lesion on exposed skin \par \par NEUROLOGICAL EXAM:\par Mental Status\par Orientation: alert and oriented to person, place, time, and situation\par Language: clear and fluent, intact comprehension and repetition, intact naming and reading\par \par Cranial Nerves\par II: visual fields full to confrontation \par III, IV, VI: PERRL, EOMI\par V, VII: facial sensation and movement intact and symmetric \par VIII: hearing intact \par IX, X: uvula midline, soft palate elevates normally \par XI: BL shoulder shrug intact \par XII: tongue midline\par \par Motor\par Shoulder abd: 5 (R), 5 (L)\par EF/EE: 5 (R), 5 (L)\par hand : 5 (R), 5 (L)\par HF/HE: 5 (R), 5 (L)\par KF/KE: 5 (R), 5 (L)\par DF/PF: 5 (R), 5 (L) \par Tone and bulk are normal in upper and lower limbs\par No pronator drift\par \par Sensation\par Intact to light touch in all 4 EXTs\par \par Reflex\par 2+ in BL biceps, brachioradialis, patella\par \par Coordination\par Normal FTN bilaterally\par \par Gait\par Normal stance, stride, and pivot turn\par Tandem walk intact

## 2022-12-03 NOTE — REVIEW OF SYSTEMS
[Abnormal Sensation] : an abnormal sensation [Fever] : no fever [Chills] : no chills [Confused or Disoriented] : no confusion [Memory Lapses or Loss] : no memory loss [Decr. Concentrating Ability] : no decrease in concentrating ability [Difficulty with Language] : no ~M difficulty with language [Changed Thought Patterns] : no change in thought patterns [Facial Weakness] : no facial weakness [Arm Weakness] : no arm weakness [Hand Weakness] : no hand weakness [Leg Weakness] : no leg weakness [Poor Coordination] : good coordination [Numbness] : no numbness [Tingling] : no tingling [Seizures] : no convulsions [Lightheadedness] : no lightheadedness [Difficulty Walking] : no difficulty walking [Frequent Falls] : not falling [Anxiety] : no anxiety [Depression] : no depression [Eye Pain] : no eye pain [Red Eyes] : eyes not red [Earache] : no earache [Loss Of Hearing] : no hearing loss [Chest Pain] : no chest pain [Palpitations] : no palpitations [Shortness Of Breath] : no shortness of breath [Cough] : no cough

## 2022-12-03 NOTE — DISCUSSION/SUMMARY
FreeTextEntry1] : Ms. Valle is a 57 year-old woman with PMH tobacco use, CAD s/p PCI (on ASA and Plavix), HTN, HLD, COPD, depression, anxiety and ETOH use who presented to the office today for hospital follow-up. She is one week post left MCA territory infarct, likely 2/2 ESUS. ILR in place. From a neurology stand point, she should continue DAPT for at least three months as per SAMPRISS trial. Will defer to cardiology if DAPT is required longer for CAD s/p PCI. Continue statin therapy and antihypertensive medications as ordered. Abstain from ETOH and tobacco use. Follow-up with me again in three months or sooner should the need arise. All of her questions and concerns were addressed. mRS 0. \par \par \par

## 2022-12-07 ENCOUNTER — APPOINTMENT (OUTPATIENT)
Dept: FAMILY MEDICINE | Facility: CLINIC | Age: 57
End: 2022-12-07

## 2022-12-07 VITALS
DIASTOLIC BLOOD PRESSURE: 70 MMHG | BODY MASS INDEX: 26.63 KG/M2 | OXYGEN SATURATION: 98 % | HEART RATE: 81 BPM | WEIGHT: 156 LBS | TEMPERATURE: 97.6 F | SYSTOLIC BLOOD PRESSURE: 140 MMHG | HEIGHT: 64 IN | RESPIRATION RATE: 14 BRPM

## 2022-12-07 DIAGNOSIS — Z86.73 PERSONAL HISTORY OF TRANSIENT ISCHEMIC ATTACK (TIA), AND CEREBRAL INFARCTION W/OUT RESIDUAL DEFICITS: ICD-10-CM

## 2022-12-07 DIAGNOSIS — Z71.85 ENCOUNTER FOR IMMUNIZATION SAFETY COUNSELING: ICD-10-CM

## 2022-12-07 DIAGNOSIS — Z23 ENCOUNTER FOR IMMUNIZATION: ICD-10-CM

## 2022-12-07 DIAGNOSIS — Z09 ENCOUNTER FOR FOLLOW-UP EXAMINATION AFTER COMPLETED TREATMENT FOR CONDITIONS OTHER THAN MALIGNANT NEOPLASM: ICD-10-CM

## 2022-12-07 PROCEDURE — 99495 TRANSJ CARE MGMT MOD F2F 14D: CPT | Mod: 25

## 2022-12-07 PROCEDURE — 90677 PCV20 VACCINE IM: CPT

## 2022-12-07 PROCEDURE — G0009: CPT

## 2022-12-07 RX ORDER — FLUOXETINE HCL 10 MG
1 CAPSULE ORAL
Qty: 0 | Refills: 0 | DISCHARGE

## 2022-12-07 RX ORDER — METOPROLOL TARTRATE 50 MG
1 TABLET ORAL
Qty: 0 | Refills: 0 | DISCHARGE

## 2022-12-07 RX ORDER — LISINOPRIL 2.5 MG/1
1 TABLET ORAL
Qty: 0 | Refills: 0 | DISCHARGE

## 2022-12-07 RX ORDER — CLOPIDOGREL BISULFATE 75 MG/1
1 TABLET, FILM COATED ORAL
Qty: 0 | Refills: 0 | DISCHARGE

## 2022-12-07 RX ORDER — ATORVASTATIN CALCIUM 80 MG/1
1 TABLET, FILM COATED ORAL
Qty: 0 | Refills: 0 | DISCHARGE

## 2022-12-07 RX ORDER — HYDROXYZINE HCL 10 MG
1 TABLET ORAL
Qty: 0 | Refills: 0 | DISCHARGE

## 2022-12-07 RX ORDER — ASPIRIN/CALCIUM CARB/MAGNESIUM 324 MG
1 TABLET ORAL
Qty: 0 | Refills: 0 | DISCHARGE

## 2022-12-07 RX ORDER — FLUOXETINE HCL 10 MG
3 CAPSULE ORAL
Qty: 0 | Refills: 0 | DISCHARGE

## 2022-12-07 RX ORDER — TRAZODONE HCL 50 MG
1 TABLET ORAL
Qty: 0 | Refills: 0 | DISCHARGE

## 2022-12-07 RX ORDER — ARIPIPRAZOLE 15 MG/1
1 TABLET ORAL
Qty: 0 | Refills: 0 | DISCHARGE

## 2022-12-07 NOTE — HISTORY OF PRESENT ILLNESS
[Post-hospitalization from ___ Hospital] : Post-hospitalization from [unfilled] Hospital [Admitted on: ___] : The patient was admitted on [unfilled] [Discharged on ___] : discharged on [unfilled] [Discharge Summary] : discharge summary [Pertinent Labs] : pertinent labs [Discharge Med List] : discharge medication list [Other: ____] : [unfilled] [Med Reconciliation] : medication reconciliation has been completed [Patient Contacted By: ____] : and contacted by [unfilled] [FreeTextEntry2] : 61 year old female with history of Smoking, CAD s/p PCI, HTN, HLD, COPD, Depression, Anxiety and Alcoholism admitted with right sided weakness and numbness. She was Code Stroke in ER. CT showed + defect, M2 L MCA \par occlusion/Stenosis. She was given tPA and was admitted to ICU. She was started on Cardene Infusion for BP management and it has been weaned off. Symptoms improved. Repeat CT Head with no hemorrhage. Downgraded to Medicine. TTE with EF of 40-45% with wall motion abnormalities, cardiology consulted. MRI brain done.\par Pt d/c home.\par Seen by Neurology\par

## 2022-12-07 NOTE — ASSESSMENT
[FreeTextEntry1] : Discharge Diagnoses, \par #Cerebrovascular accident (CVA)\par \par #  Hypertensive urgency \par Assessment and Plan of Treatment: \par \par # COPD:\par -F/up with Pulmonology.\par -On Advair\par -On Albuterol prn\par \par # CAD/ HTN:\par -F/up with cardiology\par -On Atorvastatin.\par -On Aspirin\par -On Plavix\par -On Lisinopril, Metoprolol\par \par # Depression/ Anxiety:\par -F/up with Psychiatry, Dr Echavarria.> needs to find new Psychiatry\par -On Fluoxetine and Aripiprazole.\par -Hydroxyzine prn. \par \par Immunizations:\par -Flu and prevnar today\par -Blood drawn today\par

## 2022-12-07 NOTE — PHYSICAL EXAM
[No Acute Distress] : no acute distress [Well Nourished] : well nourished [Well Developed] : well developed [Well-Appearing] : well-appearing [Normal Sclera/Conjunctiva] : normal sclera/conjunctiva [PERRL] : pupils equal round and reactive to light [EOMI] : extraocular movements intact [Normal Outer Ear/Nose] : the outer ears and nose were normal in appearance [Normal Oropharynx] : the oropharynx was normal [No JVD] : no jugular venous distention [No Lymphadenopathy] : no lymphadenopathy [Supple] : supple [Thyroid Normal, No Nodules] : the thyroid was normal and there were no nodules present [No Respiratory Distress] : no respiratory distress  [No Accessory Muscle Use] : no accessory muscle use [Clear to Auscultation] : lungs were clear to auscultation bilaterally [Normal Rate] : normal rate  [Regular Rhythm] : with a regular rhythm [Normal S1, S2] : normal S1 and S2 [No Murmur] : no murmur heard [No Carotid Bruits] : no carotid bruits [No Abdominal Bruit] : a ~M bruit was not heard ~T in the abdomen [No Varicosities] : no varicosities [Pedal Pulses Present] : the pedal pulses are present [No Edema] : there was no peripheral edema [No Palpable Aorta] : no palpable aorta [No Extremity Clubbing/Cyanosis] : no extremity clubbing/cyanosis [Soft] : abdomen soft [Non Tender] : non-tender [Non-distended] : non-distended [No Masses] : no abdominal mass palpated [No HSM] : no HSM [Normal Bowel Sounds] : normal bowel sounds [Normal Posterior Cervical Nodes] : no posterior cervical lymphadenopathy [Normal Anterior Cervical Nodes] : no anterior cervical lymphadenopathy [No CVA Tenderness] : no CVA  tenderness [No Spinal Tenderness] : no spinal tenderness [No Joint Swelling] : no joint swelling [Grossly Normal Strength/Tone] : grossly normal strength/tone [No Rash] : no rash [Coordination Grossly Intact] : coordination grossly intact [No Focal Deficits] : no focal deficits [Normal Gait] : normal gait [Deep Tendon Reflexes (DTR)] : deep tendon reflexes were 2+ and symmetric [Normal Affect] : the affect was normal [Normal Insight/Judgement] : insight and judgment were intact [36726 - Moderate Complexity requires multiple possible diagnoses and/or the management options, moderate complexity of the medical data (tests, etc.) to be reviewed, and moderate risk of significant complications, morbidity, and/or mortality as well as co] : Moderate Complexity

## 2022-12-07 NOTE — HEALTH RISK ASSESSMENT
[Current] : Current [20 or more] : 20 or more [Yes] : In the past 12 months have you used drugs other than those required for medical reasons? Yes [No falls in past year] : Patient reported no falls in the past year [Fully functional (bathing, dressing, toileting, transferring, walking, feeding)] : Fully functional (bathing, dressing, toileting, transferring, walking, feeding) [Fully functional (using the telephone, shopping, preparing meals, housekeeping, doing laundry, using] : Fully functional and needs no help or supervision to perform IADLs (using the telephone, shopping, preparing meals, housekeeping, doing laundry, using transportation, managing medications and managing finances) [Designated Healthcare Proxy] : Designated healthcare proxy [Name: ___] : Health Care Proxy's Name: [unfilled]  [Relationship: ___] : Relationship: [unfilled] [Intercurrent hospitalizations] : was admitted to the hospital  [de-identified] : Neurology [de-identified] : trying to quit [de-identified] : 16 days sober [de-identified] : 16 days clean [AdvancecareDate] : 12/22

## 2022-12-08 ENCOUNTER — MED ADMIN CHARGE (OUTPATIENT)
Age: 57
End: 2022-12-08

## 2022-12-08 LAB
ALBUMIN SERPL ELPH-MCNC: 4.4 G/DL
ALP BLD-CCNC: 139 U/L
ALT SERPL-CCNC: 28 U/L
ANION GAP SERPL CALC-SCNC: 12 MMOL/L
AST SERPL-CCNC: 29 U/L
BASOPHILS # BLD AUTO: 0.1 K/UL
BASOPHILS NFR BLD AUTO: 1.1 %
BILIRUB SERPL-MCNC: 0.2 MG/DL
BUN SERPL-MCNC: 14 MG/DL
CALCIUM SERPL-MCNC: 9.3 MG/DL
CHLORIDE SERPL-SCNC: 105 MMOL/L
CHOLEST SERPL-MCNC: 127 MG/DL
CO2 SERPL-SCNC: 25 MMOL/L
CREAT SERPL-MCNC: 0.77 MG/DL
EGFR: 89 ML/MIN/1.73M2
EOSINOPHIL # BLD AUTO: 0.7 K/UL
EOSINOPHIL NFR BLD AUTO: 7.8 %
FOLATE SERPL-MCNC: 15.9 NG/ML
GLUCOSE SERPL-MCNC: 105 MG/DL
HCT VFR BLD CALC: 38 %
HDLC SERPL-MCNC: 40 MG/DL
HGB BLD-MCNC: 11.8 G/DL
IMM GRANULOCYTES NFR BLD AUTO: 0.4 %
LDLC SERPL CALC-MCNC: 70 MG/DL
LYMPHOCYTES # BLD AUTO: 2.27 K/UL
LYMPHOCYTES NFR BLD AUTO: 25.4 %
MAN DIFF?: NORMAL
MCHC RBC-ENTMCNC: 29.2 PG
MCHC RBC-ENTMCNC: 31.1 GM/DL
MCV RBC AUTO: 94.1 FL
MONOCYTES # BLD AUTO: 0.52 K/UL
MONOCYTES NFR BLD AUTO: 5.8 %
NEUTROPHILS # BLD AUTO: 5.29 K/UL
NEUTROPHILS NFR BLD AUTO: 59.5 %
NONHDLC SERPL-MCNC: 87 MG/DL
PLATELET # BLD AUTO: 415 K/UL
POTASSIUM SERPL-SCNC: 4.4 MMOL/L
PROT SERPL-MCNC: 7.1 G/DL
RBC # BLD: 4.04 M/UL
RBC # FLD: 14.2 %
SODIUM SERPL-SCNC: 142 MMOL/L
TRIGL SERPL-MCNC: 87 MG/DL
VIT B12 SERPL-MCNC: 1080 PG/ML
WBC # FLD AUTO: 8.92 K/UL

## 2022-12-16 ENCOUNTER — APPOINTMENT (OUTPATIENT)
Dept: ELECTROPHYSIOLOGY | Facility: CLINIC | Age: 57
End: 2022-12-16

## 2022-12-16 ENCOUNTER — NON-APPOINTMENT (OUTPATIENT)
Age: 57
End: 2022-12-16

## 2022-12-16 VITALS
HEART RATE: 75 BPM | HEIGHT: 64 IN | BODY MASS INDEX: 26.46 KG/M2 | WEIGHT: 155 LBS | SYSTOLIC BLOOD PRESSURE: 118 MMHG | OXYGEN SATURATION: 92 % | TEMPERATURE: 98.5 F | DIASTOLIC BLOOD PRESSURE: 76 MMHG

## 2022-12-16 DIAGNOSIS — I63.9 CEREBRAL INFARCTION, UNSPECIFIED: ICD-10-CM

## 2022-12-16 DIAGNOSIS — Z95.818 PRESENCE OF OTHER CARDIAC IMPLANTS AND GRAFTS: ICD-10-CM

## 2022-12-16 PROCEDURE — 93000 ELECTROCARDIOGRAM COMPLETE: CPT

## 2022-12-16 PROCEDURE — 99214 OFFICE O/P EST MOD 30 MIN: CPT

## 2022-12-16 NOTE — CARDIOLOGY SUMMARY
D: Called patient for routine virtual VAD Coordinator rounding. No VAD related questions or concerns at this time.  I: Discussed POC and provided support and listened to patient and care giver's thoughts and concerns. Pt is planning to discharge to home ([place]) this weekend. Unit care coordinator is organizing ID follow-up and IV abx in [place]. Discussed follow-up with VAD team and new referral for EP. Pt had preferred to follow with local EP team, however discussed that close collaboration with the VAD team is extremely beneficial, and we will try to coordinate follow-up appts between VAD and EP so he is not making multiple trips to [place].   P: Continue to follow patient and address any questions or concerns patient and or caregiver may have.       [de-identified] : 12/16/22:  accelerated junctional rhythm @ 78bpm

## 2022-12-16 NOTE — DISCUSSION/SUMMARY
[EKG obtained to assist in diagnosis and management of assessed problem(s)] : EKG obtained to assist in diagnosis and management of assessed problem(s) [FreeTextEntry1] : 57 year old female with PMH of HTN, depression, COPD, CAD s/p PCI,= cardiomyopathy LVEF 45%, anxiety, and ETOH dependency who presented to [facility] w/ right sided weakness and slurred speech. CT showed + defect, M2 L MCA occlusion/Stenosis; s/p TPA. ABA on 11/28/22 was negative for cardioembolic source for CVA. Now s/p uncomplicated ILR implant.\par \par Presents for post implant device and wound check. Overall doing well and denies CP, SOB, DUNBAR, dizziness, palpitations, syncope or presyncope. \par \par - Implant site is healing well and WNL.\par - NO AF, noah or tachyarrhythmias noted thus far\par - Remote monitoring and monthly billing reviewed at length\par - Patient is planned for upcoming dental implant procedure. Discussed with Dr Diaz, ok to proceed with dental procedure. Should remain on ASA, can hold Plavix for 3 days (as requested my dentist) \par - continue to monitor ILR and cardiology f/up with Dr. Diaz with EP f/up based on ILR findings\par \par Zoila Hassan PAC\par \par

## 2022-12-16 NOTE — PHYSICAL EXAM
[Well Developed] : well developed [No Acute Distress] : no acute distress [Normal Gait] : normal gait [No Edema] : no edema [No Rash] : no rash [Moves all extremities] : moves all extremities [Alert and Oriented] : alert and oriented [de-identified] : Parasternal implant site is healing well and WNL

## 2022-12-16 NOTE — HISTORY OF PRESENT ILLNESS
[FreeTextEntry1] : 57 year old female with PMH of HTN, depression, COPD, CAD s/p PCI,= cardiomyopathy LVEF 45%, anxiety, and ETOH dependency who presented to [facility] w/ right sided weakness and slurred speech. CT showed + defect, M2 L MCA occlusion/Stenosis; s/p TPA. ABA on 11/28/22 was negative for cardioembolic source for CVA. Now s/p uncomplicated ILR implant.\par \par Presents for post implant device and wound check. Overall doing well and denies CP, SOB, DUNBAR, dizziness, palpitations, syncope or presyncope. Implant site is healing well and WNL.

## 2023-01-18 ENCOUNTER — NON-APPOINTMENT (OUTPATIENT)
Age: 58
End: 2023-01-18

## 2023-01-18 ENCOUNTER — APPOINTMENT (OUTPATIENT)
Dept: ELECTROPHYSIOLOGY | Facility: CLINIC | Age: 58
End: 2023-01-18
Payer: MEDICARE

## 2023-01-18 PROCEDURE — G2066: CPT

## 2023-01-18 PROCEDURE — 93298 REM INTERROG DEV EVAL SCRMS: CPT

## 2023-01-19 PROBLEM — I10 ESSENTIAL (PRIMARY) HYPERTENSION: Chronic | Status: ACTIVE | Noted: 2022-11-23

## 2023-01-19 PROBLEM — I25.10 ATHEROSCLEROTIC HEART DISEASE OF NATIVE CORONARY ARTERY WITHOUT ANGINA PECTORIS: Chronic | Status: ACTIVE | Noted: 2022-11-23

## 2023-01-19 PROBLEM — F32.9 MAJOR DEPRESSIVE DISORDER, SINGLE EPISODE, UNSPECIFIED: Chronic | Status: ACTIVE | Noted: 2022-11-23

## 2023-02-22 ENCOUNTER — NON-APPOINTMENT (OUTPATIENT)
Age: 58
End: 2023-02-22

## 2023-02-22 ENCOUNTER — APPOINTMENT (OUTPATIENT)
Dept: ELECTROPHYSIOLOGY | Facility: CLINIC | Age: 58
End: 2023-02-22
Payer: MEDICARE

## 2023-02-22 PROCEDURE — 93298 REM INTERROG DEV EVAL SCRMS: CPT

## 2023-02-22 PROCEDURE — G2066: CPT

## 2023-03-27 ENCOUNTER — APPOINTMENT (OUTPATIENT)
Dept: ELECTROPHYSIOLOGY | Facility: CLINIC | Age: 58
End: 2023-03-27
Payer: MEDICARE

## 2023-03-27 ENCOUNTER — NON-APPOINTMENT (OUTPATIENT)
Age: 58
End: 2023-03-27

## 2023-03-27 PROCEDURE — G2066: CPT

## 2023-03-27 PROCEDURE — 93298 REM INTERROG DEV EVAL SCRMS: CPT

## 2023-04-06 ENCOUNTER — APPOINTMENT (OUTPATIENT)
Dept: FAMILY MEDICINE | Facility: CLINIC | Age: 58
End: 2023-04-06
Payer: MEDICARE

## 2023-04-06 VITALS
SYSTOLIC BLOOD PRESSURE: 122 MMHG | OXYGEN SATURATION: 96 % | HEART RATE: 80 BPM | HEIGHT: 64 IN | DIASTOLIC BLOOD PRESSURE: 70 MMHG | WEIGHT: 146 LBS | RESPIRATION RATE: 16 BRPM | TEMPERATURE: 97.2 F | BODY MASS INDEX: 24.92 KG/M2

## 2023-04-06 DIAGNOSIS — Z87.891 PERSONAL HISTORY OF NICOTINE DEPENDENCE: ICD-10-CM

## 2023-04-06 PROCEDURE — 99214 OFFICE O/P EST MOD 30 MIN: CPT

## 2023-04-06 RX ORDER — NICOTINE POLACRILEX 2 MG/1
2 LOZENGE ORAL
Qty: 60 | Refills: 0 | Status: DISCONTINUED | COMMUNITY
Start: 2022-12-01 | End: 2023-04-06

## 2023-04-06 NOTE — HISTORY OF PRESENT ILLNESS
[FreeTextEntry1] : meds refills [de-identified] :  57 year old female with history of Smoking,Alcohol abuse, CAD s/p PCI, HTN, HLD, COPD, Depression, Anxiety and Alcoholism.\par CVA 11/2022 L MCA occlusion/Stenosis. She was given tPA .. TTE with EF of 40-45% with wall motion abnormalities, .\par She states quit smoking and alcohol since CVA.\par Here for meds refills\par

## 2023-04-06 NOTE — ASSESSMENT
[FreeTextEntry1] : #Cerebrovascular accident (CVA)\par -On Baby aspirin and Atorvastatin.\par -On Plavix\par \par # COPD:\par -F/up with Pulmonology.\par -On Advair\par -On Albuterol prn\par \par # CAD/ HTN:\par -F/up with cardiology\par -On Atorvastatin.\par -On Aspirin\par -On Plavix\par -On Lisinopril, Metoprolol\par \par # Depression/ Anxiety:\par -F/up with Psychiatry, Dr Echavarria.> needs to find new Psychiatry\par -On Fluoxetine and Aripiprazole.\par -Hydroxyzine prn. \par \par Immunizations:\par -Flu and prevnar UTD\par \par Mammo/ Colonoscopy/ GYN: Refused\par . \par \par  \par

## 2023-05-01 ENCOUNTER — NON-APPOINTMENT (OUTPATIENT)
Age: 58
End: 2023-05-01

## 2023-05-01 ENCOUNTER — APPOINTMENT (OUTPATIENT)
Dept: ELECTROPHYSIOLOGY | Facility: CLINIC | Age: 58
End: 2023-05-01
Payer: MEDICARE

## 2023-05-01 PROCEDURE — 93298 REM INTERROG DEV EVAL SCRMS: CPT

## 2023-05-01 PROCEDURE — G2066: CPT

## 2023-05-04 ENCOUNTER — APPOINTMENT (OUTPATIENT)
Dept: FAMILY MEDICINE | Facility: CLINIC | Age: 58
End: 2023-05-04
Payer: MEDICARE

## 2023-05-04 VITALS
RESPIRATION RATE: 14 BRPM | WEIGHT: 144 LBS | HEART RATE: 85 BPM | DIASTOLIC BLOOD PRESSURE: 74 MMHG | TEMPERATURE: 97.4 F | BODY MASS INDEX: 24.59 KG/M2 | HEIGHT: 64 IN | SYSTOLIC BLOOD PRESSURE: 124 MMHG | OXYGEN SATURATION: 95 %

## 2023-05-04 DIAGNOSIS — I10 ESSENTIAL (PRIMARY) HYPERTENSION: ICD-10-CM

## 2023-05-04 DIAGNOSIS — Z72.0 TOBACCO USE: ICD-10-CM

## 2023-05-04 PROCEDURE — 99214 OFFICE O/P EST MOD 30 MIN: CPT

## 2023-05-04 NOTE — HISTORY OF PRESENT ILLNESS
[FreeTextEntry1] : meds refills [de-identified] :  57 year old female with history of Smoking,Alcohol abuse, CAD s/p PCI, HTN, HLD, COPD, Depression, Anxiety and Alcoholism.\par CVA 11/2022 L MCA occlusion/Stenosis. She was given tPA .. TTE with EF of 40-45% with wall motion abnormalities, .\par She relapse smoking. No alcohol since CVA.\par Here for meds refills\par

## 2023-05-04 NOTE — HEALTH RISK ASSESSMENT
[Yes] : Yes [No] : In the past 12 months have you used drugs other than those required for medical reasons? No [No falls in past year] : Patient reported no falls in the past year [0] : 2) Feeling down, depressed, or hopeless: Not at all (0) [PHQ-2 Negative - No further assessment needed] : PHQ-2 Negative - No further assessment needed [20 or more] : 20 or more [< 15 Years] : < 15 Years [Current] : Current [de-identified] : 136 days sober [[id number]] : 0 [de-identified] : QUIT 2 months ago and relapse

## 2023-05-04 NOTE — ASSESSMENT
[FreeTextEntry1] : #Cerebrovascular accident (CVA)\par -On Baby aspirin and Atorvastatin.\par -On Plavix\par \par # COPD:\par -F/up with Pulmonology.\par -On Advair\par -On Albuterol prn\par \par # CAD/ HTN:\par -F/up with cardiology\par -On Atorvastatin.\par -On Aspirin\par -On Plavix\par -On Lisinopril, Metoprolol\par \par # Depression/ Anxiety:\par -F/up with Psychiatry, Dr Echavarria.> needs to find new Psychiatry\par -On Fluoxetine and Aripiprazole.\par -Hydroxyzine prn. \par \par # Smoking trying to quit\par -Smoking cessation program referral.\par \par Immunizations:\par -Flu and prevnar UTD\par \par Mammo/ Colonoscopy/ GYN: Refused\par . \par \par  \par

## 2023-05-04 NOTE — COUNSELING
[Fall prevention counseling provided] : Fall prevention counseling provided [Adequate lighting] : Adequate lighting [No throw rugs] : No throw rugs [Behavioral health counseling provided] : Behavioral health counseling provided [None] : None [Good understanding] : Patient has a good understanding of lifestyle changes and steps needed to achieve self management goal [Cessation strategies including cessation program discussed] : Cessation strategies including cessation program discussed [Yes] : Willing to quit smoking [FreeTextEntry1] : 5

## 2023-06-05 ENCOUNTER — NON-APPOINTMENT (OUTPATIENT)
Age: 58
End: 2023-06-05

## 2023-06-05 ENCOUNTER — APPOINTMENT (OUTPATIENT)
Dept: ELECTROPHYSIOLOGY | Facility: CLINIC | Age: 58
End: 2023-06-05
Payer: MEDICARE

## 2023-06-05 PROCEDURE — G2066: CPT

## 2023-06-05 PROCEDURE — 93298 REM INTERROG DEV EVAL SCRMS: CPT

## 2023-06-28 ENCOUNTER — RX RENEWAL (OUTPATIENT)
Age: 58
End: 2023-06-28

## 2023-07-03 ENCOUNTER — RX RENEWAL (OUTPATIENT)
Age: 58
End: 2023-07-03

## 2023-07-06 ENCOUNTER — APPOINTMENT (OUTPATIENT)
Dept: FAMILY MEDICINE | Facility: CLINIC | Age: 58
End: 2023-07-06
Payer: MEDICARE

## 2023-07-06 ENCOUNTER — LABORATORY RESULT (OUTPATIENT)
Age: 58
End: 2023-07-06

## 2023-07-06 VITALS
TEMPERATURE: 97 F | HEART RATE: 97 BPM | HEIGHT: 64 IN | DIASTOLIC BLOOD PRESSURE: 70 MMHG | WEIGHT: 134 LBS | SYSTOLIC BLOOD PRESSURE: 110 MMHG | BODY MASS INDEX: 22.88 KG/M2 | OXYGEN SATURATION: 96 % | RESPIRATION RATE: 14 BRPM

## 2023-07-06 DIAGNOSIS — Z00.00 ENCOUNTER FOR GENERAL ADULT MEDICAL EXAMINATION W/OUT ABNORMAL FINDINGS: ICD-10-CM

## 2023-07-06 PROCEDURE — G0439: CPT

## 2023-07-06 PROCEDURE — G0296 VISIT TO DETERM LDCT ELIG: CPT

## 2023-07-06 PROCEDURE — 36415 COLL VENOUS BLD VENIPUNCTURE: CPT

## 2023-07-06 RX ORDER — KRILL/OM-3/DHA/EPA/PHOSPHO/AST 1000-230MG
81 CAPSULE ORAL
Qty: 90 | Refills: 3 | Status: ACTIVE | COMMUNITY
Start: 2021-01-08 | End: 1900-01-01

## 2023-07-06 NOTE — PHYSICAL EXAM
No chief complaint on file. 1. Have you been to the ER, urgent care clinic since your last visit? Hospitalized since your last visit? No      2. Have you seen or consulted any other health care providers outside of the [address] [place] [address] since your last visit? Include any pap smears or colon screening.  No [No Acute Distress] : no acute distress [Well Nourished] : well nourished [Well Developed] : well developed [Well-Appearing] : well-appearing [Normal Sclera/Conjunctiva] : normal sclera/conjunctiva [PERRL] : pupils equal round and reactive to light [EOMI] : extraocular movements intact [Normal Outer Ear/Nose] : the outer ears and nose were normal in appearance [Normal Oropharynx] : the oropharynx was normal [No JVD] : no jugular venous distention [No Lymphadenopathy] : no lymphadenopathy [Supple] : supple [Thyroid Normal, No Nodules] : the thyroid was normal and there were no nodules present [No Respiratory Distress] : no respiratory distress  [No Accessory Muscle Use] : no accessory muscle use [Clear to Auscultation] : lungs were clear to auscultation bilaterally [Normal Rate] : normal rate  [Regular Rhythm] : with a regular rhythm [Normal S1, S2] : normal S1 and S2 [No Murmur] : no murmur heard [No Carotid Bruits] : no carotid bruits [No Abdominal Bruit] : a ~M bruit was not heard ~T in the abdomen [No Varicosities] : no varicosities [Pedal Pulses Present] : the pedal pulses are present [No Edema] : there was no peripheral edema [No Palpable Aorta] : no palpable aorta [No Extremity Clubbing/Cyanosis] : no extremity clubbing/cyanosis [Soft] : abdomen soft [Non Tender] : non-tender [Non-distended] : non-distended [No Masses] : no abdominal mass palpated [No HSM] : no HSM [Normal Bowel Sounds] : normal bowel sounds [No Spinal Tenderness] : no spinal tenderness [No CVA Tenderness] : no CVA  tenderness [No Joint Swelling] : no joint swelling [Grossly Normal Strength/Tone] : grossly normal strength/tone [No Rash] : no rash [Coordination Grossly Intact] : coordination grossly intact [No Focal Deficits] : no focal deficits [Normal Gait] : normal gait [Deep Tendon Reflexes (DTR)] : deep tendon reflexes were 2+ and symmetric [Normal Affect] : the affect was normal [Normal Insight/Judgement] : insight and judgment were intact

## 2023-07-06 NOTE — HEALTH RISK ASSESSMENT
[Good] : ~his/her~  mood as  good [Yes] : Yes [No] : In the past 12 months have you used drugs other than those required for medical reasons? No [No falls in past year] : Patient reported no falls in the past year [0] : 2) Feeling down, depressed, or hopeless: Not at all (0) [Patient declined mammogram] : Patient declined mammogram [Patient declined PAP Smear] : Patient declined PAP Smear [Patient declined bone density test] : Patient declined bone density test [Patient declined colonoscopy] : Patient declined colonoscopy [None] : None [Alone] : lives alone [On disability] : on disability [Single] : single [# Of Children ___] : has [unfilled] children [Feels Safe at Home] : Feels safe at home [Fully functional (bathing, dressing, toileting, transferring, walking, feeding)] : Fully functional (bathing, dressing, toileting, transferring, walking, feeding) [Fully functional (using the telephone, shopping, preparing meals, housekeeping, doing laundry, using] : Fully functional and needs no help or supervision to perform IADLs (using the telephone, shopping, preparing meals, housekeeping, doing laundry, using transportation, managing medications and managing finances) [Smoke Detector] : smoke detector [Safety elements used in home] : safety elements used in home [Seat Belt] :  uses seat belt [Designated Healthcare Proxy] : Designated healthcare proxy [Name: ___] : Health Care Proxy's Name: [unfilled]  [Relationship: ___] : Relationship: [unfilled] [HIV test declined] : HIV test declined [Hepatitis C test declined] : Hepatitis C test declined [Current] : Current [20 or more] : 20 or more [PHQ-2 Negative - No further assessment needed] : PHQ-2 Negative - No further assessment needed [de-identified] : 228 days sober [de-identified] : Eliptical 3/week and walks daily [de-identified] : no [[id number]] : 0 [Sexually Active] : not sexually active [Reports changes in hearing] : Reports no changes in hearing [Reports changes in vision] : Reports no changes in vision [Reports changes in dental health] : Reports no changes in dental health [TB Exposure] : is not being exposed to tuberculosis [HIVDate] : 2022 [HepatitisCDate] : 2022 [de-identified] : since 2012 for Mental disability [AdvancecareDate] : 07/23

## 2023-07-06 NOTE — HISTORY OF PRESENT ILLNESS
[FreeTextEntry1] : Establish PCP [de-identified] : 57 year old female with history of Smoking,Alcohol abuse, CAD s/p PCI, HTN, HLD, COPD, Depression, Anxiety and Alcoholism.\par CVA 11/2022 L MCA occlusion/Stenosis. She was given tPA.. TTE with EF of 40-45% with wall motion abnormalities,.\par She relapse smoking. No alcohol since CVA.\par Seen by psychiatry, Dr Echavarria.\par Pulmonology: Dr Son\par Cardiology: Dr FISHER.\par She is on disability for Mental disorder. Single, G0

## 2023-07-10 ENCOUNTER — APPOINTMENT (OUTPATIENT)
Dept: ELECTROPHYSIOLOGY | Facility: CLINIC | Age: 58
End: 2023-07-10
Payer: MEDICARE

## 2023-07-10 ENCOUNTER — NON-APPOINTMENT (OUTPATIENT)
Age: 58
End: 2023-07-10

## 2023-07-10 PROCEDURE — 93298 REM INTERROG DEV EVAL SCRMS: CPT

## 2023-07-10 PROCEDURE — G2066: CPT

## 2023-07-11 LAB
ALBUMIN SERPL ELPH-MCNC: 4.5 G/DL
ALP BLD-CCNC: 89 U/L
ALT SERPL-CCNC: 8 U/L
ANION GAP SERPL CALC-SCNC: 14 MMOL/L
APPEARANCE: CLEAR
AST SERPL-CCNC: 16 U/L
BILIRUB SERPL-MCNC: 0.5 MG/DL
BILIRUBIN URINE: ABNORMAL
BLOOD URINE: NEGATIVE
BUN SERPL-MCNC: 16 MG/DL
CALCIUM SERPL-MCNC: 9.4 MG/DL
CHLORIDE SERPL-SCNC: 107 MMOL/L
CHOLEST SERPL-MCNC: 129 MG/DL
CO2 SERPL-SCNC: 22 MMOL/L
COLOR: ABNORMAL
CREAT SERPL-MCNC: 0.8 MG/DL
EGFR: 85 ML/MIN/1.73M2
ESTIMATED AVERAGE GLUCOSE: 128 MG/DL
GLUCOSE QUALITATIVE U: ABNORMAL
GLUCOSE SERPL-MCNC: 145 MG/DL
HBA1C MFR BLD HPLC: 6.1 %
HDLC SERPL-MCNC: 34 MG/DL
KETONES URINE: NORMAL
LDLC SERPL CALC-MCNC: 77 MG/DL
LEUKOCYTE ESTERASE URINE: NEGATIVE
NITRITE URINE: POSITIVE
NONHDLC SERPL-MCNC: 95 MG/DL
PH URINE: 6.5
POTASSIUM SERPL-SCNC: 4.3 MMOL/L
PROT SERPL-MCNC: 6.8 G/DL
PROTEIN URINE: ABNORMAL
SODIUM SERPL-SCNC: 144 MMOL/L
SPECIFIC GRAVITY URINE: >=1.03
TRIGL SERPL-MCNC: 91 MG/DL
TSH SERPL-ACNC: 0.97 UIU/ML
UROBILINOGEN URINE: NORMAL

## 2023-07-12 ENCOUNTER — NON-APPOINTMENT (OUTPATIENT)
Age: 58
End: 2023-07-12

## 2023-08-14 ENCOUNTER — APPOINTMENT (OUTPATIENT)
Dept: ELECTROPHYSIOLOGY | Facility: CLINIC | Age: 58
End: 2023-08-14
Payer: MEDICARE

## 2023-08-14 ENCOUNTER — NON-APPOINTMENT (OUTPATIENT)
Age: 58
End: 2023-08-14

## 2023-08-14 PROCEDURE — G2066: CPT

## 2023-08-14 PROCEDURE — 93298 REM INTERROG DEV EVAL SCRMS: CPT

## 2023-09-06 NOTE — ED ADULT NURSE NOTE - CAS DISCH ACCOMP BY
36 y/o M with PMH PSVT s/p ablation, GERD presents with epigastric pain. Patient reports epigastric pain and chest pressure which he associates with acid reflux.  Patient states that he had a party on Saturday and had leftover food which has been eating over the last couple days.  He feels that he became sick from the food with nausea today and multiple episodes of vomiting followed by epigastric pain.  Last EGD was long time ago there were no significant findings per patient.   Patient admitted with possible gastroenteritis/food poisoning- given IVF and zofran for nausea.    9/6- patient was seen and examined- tolerating diet- denies nausea - felt that he is  "back to normal"    Vital Signs Last 24 Hrs  T(C): 36.9 (06 Sep 2023 07:56), Max: 37.2 (06 Sep 2023 03:13)  T(F): 98.4 (06 Sep 2023 07:56), Max: 99 (06 Sep 2023 03:13)  HR: 84 (06 Sep 2023 07:56) (58 - 92)  BP: 140/93 (06 Sep 2023 07:56) (121/74 - 163/99)  BP(mean): 91 (06 Sep 2023 00:18) (91 - 116)  RR: 18 (06 Sep 2023 07:56) (18 - 20)  SpO2: 96% (06 Sep 2023 07:56) (95% - 100%)    Parameters below as of 06 Sep 2023 07:56  Patient On (Oxygen Delivery Method): room air    ROS:   All 10 systems reviewed and found to be negative with the exception of what has been described above.     PE:  Constitutional: NAD, laying in bed  HEENT: NC/AT  Back: no tenderness  Respiratory: respirations even and non labored, LCTA  Cardiovascular: S1S2 regular, no murmurs  Abdomen: soft, not tender, not distended, positive BS  Genitourinary: voiding  Musculoskeletal: no muscle tenderness, no joint swelling or tenderness  Extremities: no pedal edema   Neurological: no focal deficits    Meds/Labs- reviewed     PLAN   * Intractable nausea and vomiting likely gastroenteritis/food poisoning vs. gastritis/GERD - nausea/vomiting - resolved   - s/p 2L of NS, c/w with IVF at 125 ml/hr -dc   - Trend WBC, monitor for temperatures - afebrile   - Tylenol for temperatures PRN   - Zofran for nausea   - Carafate, Pepcid PRN   - Pain control: tylenol PRN     2. Elevated BP likely secondary to pain   - //99, monitor   - Pain control     3. Leukocytosis likely reactive       4. History of PSVT s/p ablation, GERD  - c/w home medications; verified with pt at the bedside  - Glucose 141 -> ordered HbA1c  - Alkaline phosphatase 125, trend     DVT ppx: SCDs, encourage ambulation   Emergency contact: Olivia Martinez (wife)   Encouagred to f/u with PCP as an outpatient.       
Family

## 2023-09-14 ENCOUNTER — NON-APPOINTMENT (OUTPATIENT)
Age: 58
End: 2023-09-14

## 2023-09-14 ENCOUNTER — APPOINTMENT (OUTPATIENT)
Dept: ELECTROPHYSIOLOGY | Facility: CLINIC | Age: 58
End: 2023-09-14
Payer: MEDICARE

## 2023-09-14 PROCEDURE — 93298 REM INTERROG DEV EVAL SCRMS: CPT

## 2023-09-14 PROCEDURE — G2066: CPT

## 2023-10-19 ENCOUNTER — NON-APPOINTMENT (OUTPATIENT)
Age: 58
End: 2023-10-19

## 2023-10-19 ENCOUNTER — APPOINTMENT (OUTPATIENT)
Dept: ELECTROPHYSIOLOGY | Facility: CLINIC | Age: 58
End: 2023-10-19
Payer: MEDICARE

## 2023-10-19 PROCEDURE — G2066: CPT

## 2023-10-19 PROCEDURE — 93298 REM INTERROG DEV EVAL SCRMS: CPT

## 2023-11-22 ENCOUNTER — APPOINTMENT (OUTPATIENT)
Dept: ELECTROPHYSIOLOGY | Facility: CLINIC | Age: 58
End: 2023-11-22
Payer: MEDICARE

## 2023-11-22 ENCOUNTER — NON-APPOINTMENT (OUTPATIENT)
Age: 58
End: 2023-11-22

## 2023-11-22 PROCEDURE — G2066: CPT

## 2023-11-22 PROCEDURE — 93298 REM INTERROG DEV EVAL SCRMS: CPT | Mod: NC

## 2023-12-08 ENCOUNTER — RX RENEWAL (OUTPATIENT)
Age: 58
End: 2023-12-08

## 2023-12-15 ENCOUNTER — RX RENEWAL (OUTPATIENT)
Age: 58
End: 2023-12-15

## 2023-12-27 ENCOUNTER — APPOINTMENT (OUTPATIENT)
Dept: ELECTROPHYSIOLOGY | Facility: CLINIC | Age: 58
End: 2023-12-27
Payer: MEDICARE

## 2023-12-27 ENCOUNTER — NON-APPOINTMENT (OUTPATIENT)
Age: 58
End: 2023-12-27

## 2023-12-27 PROCEDURE — G2066: CPT

## 2023-12-27 PROCEDURE — 93298 REM INTERROG DEV EVAL SCRMS: CPT

## 2024-01-10 ENCOUNTER — RX RENEWAL (OUTPATIENT)
Age: 59
End: 2024-01-10

## 2024-01-23 ENCOUNTER — APPOINTMENT (OUTPATIENT)
Dept: FAMILY MEDICINE | Facility: CLINIC | Age: 59
End: 2024-01-23
Payer: MEDICARE

## 2024-01-23 VITALS
HEART RATE: 84 BPM | WEIGHT: 117 LBS | BODY MASS INDEX: 19.97 KG/M2 | RESPIRATION RATE: 14 BRPM | HEIGHT: 64 IN | OXYGEN SATURATION: 97 % | DIASTOLIC BLOOD PRESSURE: 80 MMHG | SYSTOLIC BLOOD PRESSURE: 114 MMHG

## 2024-01-23 DIAGNOSIS — Z12.11 ENCOUNTER FOR SCREENING FOR MALIGNANT NEOPLASM OF COLON: ICD-10-CM

## 2024-01-23 DIAGNOSIS — F41.9 ANXIETY DISORDER, UNSPECIFIED: ICD-10-CM

## 2024-01-23 PROCEDURE — 99214 OFFICE O/P EST MOD 30 MIN: CPT

## 2024-01-23 RX ORDER — FLUTICASONE PROPIONATE AND SALMETEROL 100; 50 UG/1; UG/1
100-50 POWDER RESPIRATORY (INHALATION)
Qty: 3 | Refills: 0 | Status: ACTIVE | COMMUNITY
Start: 2021-01-12 | End: 1900-01-01

## 2024-01-23 RX ORDER — ARIPIPRAZOLE 2 MG/1
2 TABLET ORAL DAILY
Qty: 90 | Refills: 1 | Status: ACTIVE | COMMUNITY
Start: 1900-01-01 | End: 1900-01-01

## 2024-01-23 RX ORDER — METOPROLOL SUCCINATE 25 MG/1
25 TABLET, EXTENDED RELEASE ORAL DAILY
Qty: 90 | Refills: 1 | Status: ACTIVE | COMMUNITY
Start: 2021-01-08 | End: 1900-01-01

## 2024-01-23 RX ORDER — HYDROXYZINE HYDROCHLORIDE 25 MG/1
25 TABLET ORAL 3 TIMES DAILY
Qty: 90 | Refills: 3 | Status: ACTIVE | COMMUNITY
Start: 2022-05-19 | End: 1900-01-01

## 2024-01-23 RX ORDER — CLOPIDOGREL BISULFATE 75 MG/1
75 TABLET, FILM COATED ORAL
Qty: 90 | Refills: 2 | Status: ACTIVE | COMMUNITY
Start: 2021-01-08 | End: 1900-01-01

## 2024-01-23 RX ORDER — TRAZODONE HYDROCHLORIDE 50 MG/1
50 TABLET ORAL
Qty: 90 | Refills: 1 | Status: ACTIVE | COMMUNITY
Start: 2021-11-02 | End: 1900-01-01

## 2024-01-23 RX ORDER — ATORVASTATIN CALCIUM 80 MG/1
80 TABLET, FILM COATED ORAL
Qty: 90 | Refills: 3 | Status: ACTIVE | COMMUNITY
Start: 2021-01-08 | End: 1900-01-01

## 2024-01-23 RX ORDER — LISINOPRIL 10 MG/1
10 TABLET ORAL DAILY
Qty: 90 | Refills: 1 | Status: ACTIVE | COMMUNITY
Start: 2021-01-08 | End: 1900-01-01

## 2024-01-23 NOTE — COUNSELING
[Fall prevention counseling provided] : Fall prevention counseling provided [Behavioral health counseling provided] : Behavioral health counseling provided [Yes] : Risk of tobacco use and health benefits of smoking cessation discussed: Yes [None] : None [No] : Not willing to quit smoking [Good understanding] : Patient has a good understanding of lifestyle changes and steps needed to achieve self management goal

## 2024-01-23 NOTE — HISTORY OF PRESENT ILLNESS
[FreeTextEntry1] : meds refills [de-identified] : 57 year old female with history of Smoking,Alcohol abuse en remission, CAD s/p PCI, HTN, HLD, COPD, Depression, Anxiety and Alcoholism. CVA 11/2022 L MCA occlusion/Stenosis. She was given tPA.. TTE with EF of 40-45% with wall motion abnormalities,.  She relapse smoking. No alcohol since CVA.  Seen by psychiatry, Dr Echavarria.in the past. Pulmonology: Dr Son Cardiology: Dr FISHER. She is on disability for Mental disorder. Single, G0

## 2024-01-23 NOTE — ASSESSMENT
[FreeTextEntry1] : HCM: -July 2023 -HIV/HC screening  Mammo/ Gyn/ Colonoscopy: refused Lung cancer screening: Refused  # Nicotine dependence: -Advise and counseling  # COPD: -F/up with Pulmonology. -On Advair -On Albuterol prn  # CAD/ HTN: -F/up with cardiology -On Atorvastatin. -On Apirin -On Plavix -On Lisinopril, Metoprolol  # Depression/ Anxiety: -F/up with Psychiatry, Dr Echavarria. -On Fluoxetine and Aripiprazole. -Hydroxyzine prn.  # Immunizations: -Advise for Shingrix at Pharmacy.

## 2024-01-23 NOTE — HEALTH RISK ASSESSMENT
[Current] : Current [20 or more] : 20 or more [No] : In the past 12 months have you used drugs other than those required for medical reasons? No [Yes] : Yes [No falls in past year] : Patient reported no falls in the past year [de-identified] : sober 1 year and 2 months

## 2024-01-25 ENCOUNTER — RX CHANGE (OUTPATIENT)
Age: 59
End: 2024-01-25

## 2024-01-29 ENCOUNTER — NON-APPOINTMENT (OUTPATIENT)
Age: 59
End: 2024-01-29

## 2024-01-29 ENCOUNTER — APPOINTMENT (OUTPATIENT)
Dept: ELECTROPHYSIOLOGY | Facility: CLINIC | Age: 59
End: 2024-01-29
Payer: MEDICARE

## 2024-01-29 PROCEDURE — 93298 REM INTERROG DEV EVAL SCRMS: CPT

## 2024-02-19 ENCOUNTER — RX CHANGE (OUTPATIENT)
Age: 59
End: 2024-02-19

## 2024-03-03 ENCOUNTER — NON-APPOINTMENT (OUTPATIENT)
Age: 59
End: 2024-03-03

## 2024-03-04 ENCOUNTER — APPOINTMENT (OUTPATIENT)
Dept: ELECTROPHYSIOLOGY | Facility: CLINIC | Age: 59
End: 2024-03-04
Payer: MEDICARE

## 2024-03-04 ENCOUNTER — APPOINTMENT (OUTPATIENT)
Dept: ELECTROPHYSIOLOGY | Facility: CLINIC | Age: 59
End: 2024-03-04

## 2024-03-04 PROCEDURE — 93298 REM INTERROG DEV EVAL SCRMS: CPT

## 2024-03-19 NOTE — ED ADULT TRIAGE NOTE - BP NONINVASIVE DIASTOLIC (MM HG)
93

## 2024-03-29 NOTE — BH CONSULTATION LIAISON ASSESSMENT NOTE - NSBHCONSULTWORKUP_PSY_A_CORE
Per report pt walked over 400 feet and is comfortable with plan for dc to home. Pt able to walk independently and met goals per therapy. Pt did not require HHC and per nrsg pt comfortable with plan to dc home w/ bf. Pt to f/u with [facility] and Dr. Mills spoke to her about that.    no

## 2024-04-07 ENCOUNTER — NON-APPOINTMENT (OUTPATIENT)
Age: 59
End: 2024-04-07

## 2024-04-08 ENCOUNTER — APPOINTMENT (OUTPATIENT)
Dept: ELECTROPHYSIOLOGY | Facility: CLINIC | Age: 59
End: 2024-04-08
Payer: MEDICARE

## 2024-04-08 PROCEDURE — 93298 REM INTERROG DEV EVAL SCRMS: CPT

## 2024-04-23 ENCOUNTER — RX CHANGE (OUTPATIENT)
Age: 59
End: 2024-04-23

## 2024-04-26 ENCOUNTER — RX RENEWAL (OUTPATIENT)
Age: 59
End: 2024-04-26

## 2024-05-12 ENCOUNTER — NON-APPOINTMENT (OUTPATIENT)
Age: 59
End: 2024-05-12

## 2024-05-13 ENCOUNTER — APPOINTMENT (OUTPATIENT)
Dept: ELECTROPHYSIOLOGY | Facility: CLINIC | Age: 59
End: 2024-05-13
Payer: MEDICARE

## 2024-05-13 PROCEDURE — 93298 REM INTERROG DEV EVAL SCRMS: CPT

## 2024-05-21 ENCOUNTER — APPOINTMENT (OUTPATIENT)
Dept: FAMILY MEDICINE | Facility: CLINIC | Age: 59
End: 2024-05-21
Payer: MEDICARE

## 2024-05-21 VITALS
RESPIRATION RATE: 14 BRPM | OXYGEN SATURATION: 98 % | HEIGHT: 64 IN | TEMPERATURE: 98 F | WEIGHT: 118 LBS | BODY MASS INDEX: 20.14 KG/M2 | SYSTOLIC BLOOD PRESSURE: 134 MMHG | HEART RATE: 69 BPM | DIASTOLIC BLOOD PRESSURE: 80 MMHG

## 2024-05-21 DIAGNOSIS — I25.10 ATHEROSCLEROTIC HEART DISEASE OF NATIVE CORONARY ARTERY W/OUT ANGINA PECTORIS: ICD-10-CM

## 2024-05-21 DIAGNOSIS — F10.21 ALCOHOL DEPENDENCE, IN REMISSION: ICD-10-CM

## 2024-05-21 DIAGNOSIS — F17.210 NICOTINE DEPENDENCE, CIGARETTES, UNCOMPLICATED: ICD-10-CM

## 2024-05-21 DIAGNOSIS — Z12.4 ENCOUNTER FOR SCREENING FOR MALIGNANT NEOPLASM OF CERVIX: ICD-10-CM

## 2024-05-21 DIAGNOSIS — Z74.2 NEED FOR ASSISTANCE AT HOME AND NO OTHER HOUSEHOLD MEMBER ABLE TO RENDER CARE: ICD-10-CM

## 2024-05-21 DIAGNOSIS — Z12.39 ENCOUNTER FOR OTHER SCREENING FOR MALIGNANT NEOPLASM OF BREAST: ICD-10-CM

## 2024-05-21 DIAGNOSIS — J43.9 EMPHYSEMA, UNSPECIFIED: ICD-10-CM

## 2024-05-21 PROCEDURE — 99214 OFFICE O/P EST MOD 30 MIN: CPT

## 2024-05-21 PROCEDURE — G2211 COMPLEX E/M VISIT ADD ON: CPT

## 2024-05-21 NOTE — HEALTH RISK ASSESSMENT
[Yes] : Yes [No] : In the past 12 months have you used drugs other than those required for medical reasons? No [No falls in past year] : Patient reported no falls in the past year [Current] : Current [20 or more] : 20 or more [de-identified] : sober 1 year and 6 months

## 2024-05-21 NOTE — HISTORY OF PRESENT ILLNESS
[de-identified] : 58 year old female smoker with history of ,Alcohol abuse in remission, CAD s/p PCI, HTN, HLD, COPD, Depression, Anxiety and Alcoholism. CVA 11/2022 L MCA occlusion/Stenosis. She was given tPA.. TTE with EF of 40-45% with wall motion abnormalities,. She relapse smoking. No alcohol since CVA. Seen by psychiatry, Dr Echavarria.in the past. Pulmonology: Dr Son Cardiology: Dr FISHER. She is on disability for Mental disorder. Single, G0 Needs assistance with ADL. She states can't tolerate dust, fumes, inhalation due to COPD     [FreeTextEntry1] : Needs Home care assistance

## 2024-05-21 NOTE — COUNSELING
[Fall prevention counseling provided] : Fall prevention counseling provided [Behavioral health counseling provided] : Behavioral health counseling provided [Yes] : Risk of tobacco use and health benefits of smoking cessation discussed: Yes [No] : Not willing to quit smoking [None] : None [Good understanding] : Patient has a good understanding of lifestyle changes and steps needed to achieve self management goal

## 2024-05-21 NOTE — ASSESSMENT
[FreeTextEntry1] : HCM: -July 2023 -HIV/HC screening  Mammo/ Gyn/ Colonoscopy: refused Lung cancer screening: Refused  # Nicotine dependence: -Advise and counseling  # COPD: -F/up with Pulmonology. -On Advair -On Albuterol prn  # CAD/ HTN: -F/up with cardiology -On Atorvastatin. -On Apirin -On Plavix -On Lisinopril, Metoprolol  # Depression/ Anxiety: -F/up with Psychiatry, Dr Echavarria. -On Fluoxetine and Aripiprazole. -Hydroxyzine prn.  # Immunizations: -Advise for Shingrix at Pharmacy.  # ADL> request assistance for Housekeeping. -Pt was advise to request health Aid through Insurance and then forms will be completed. -Pt states Insurance advise Doctor note,

## 2024-05-28 NOTE — ED STATDOCS - FAMILY HISTORY
Patient returning our call, nurse unavailable at this time. Please call patient back.     No pertinent family history in first degree relatives

## 2024-06-03 ENCOUNTER — RX CHANGE (OUTPATIENT)
Age: 59
End: 2024-06-03

## 2024-06-04 RX ORDER — FLUOXETINE HYDROCHLORIDE 20 MG/1
20 CAPSULE ORAL
Qty: 90 | Refills: 1 | Status: ACTIVE | COMMUNITY
Start: 2021-10-16 | End: 1900-01-01

## 2024-06-05 ENCOUNTER — RX CHANGE (OUTPATIENT)
Age: 59
End: 2024-06-05

## 2024-06-16 ENCOUNTER — NON-APPOINTMENT (OUTPATIENT)
Age: 59
End: 2024-06-16

## 2024-06-17 ENCOUNTER — APPOINTMENT (OUTPATIENT)
Dept: ELECTROPHYSIOLOGY | Facility: CLINIC | Age: 59
End: 2024-06-17
Payer: MEDICARE

## 2024-06-17 PROCEDURE — 93298 REM INTERROG DEV EVAL SCRMS: CPT

## 2024-07-17 ENCOUNTER — NON-APPOINTMENT (OUTPATIENT)
Age: 59
End: 2024-07-17

## 2024-07-18 ENCOUNTER — APPOINTMENT (OUTPATIENT)
Dept: ELECTROPHYSIOLOGY | Facility: CLINIC | Age: 59
End: 2024-07-18
Payer: MEDICARE

## 2024-07-18 PROCEDURE — 93298 REM INTERROG DEV EVAL SCRMS: CPT

## 2024-07-23 ENCOUNTER — APPOINTMENT (OUTPATIENT)
Dept: FAMILY MEDICINE | Facility: CLINIC | Age: 59
End: 2024-07-23
Payer: MEDICARE

## 2024-07-23 ENCOUNTER — LABORATORY RESULT (OUTPATIENT)
Age: 59
End: 2024-07-23

## 2024-07-23 VITALS
RESPIRATION RATE: 16 BRPM | WEIGHT: 122 LBS | SYSTOLIC BLOOD PRESSURE: 126 MMHG | DIASTOLIC BLOOD PRESSURE: 72 MMHG | OXYGEN SATURATION: 98 % | TEMPERATURE: 98 F | HEART RATE: 75 BPM | HEIGHT: 64 IN | BODY MASS INDEX: 20.83 KG/M2

## 2024-07-23 DIAGNOSIS — I25.10 ATHEROSCLEROTIC HEART DISEASE OF NATIVE CORONARY ARTERY W/OUT ANGINA PECTORIS: ICD-10-CM

## 2024-07-23 DIAGNOSIS — F10.21 ALCOHOL DEPENDENCE, IN REMISSION: ICD-10-CM

## 2024-07-23 DIAGNOSIS — F17.210 NICOTINE DEPENDENCE, CIGARETTES, UNCOMPLICATED: ICD-10-CM

## 2024-07-23 DIAGNOSIS — F41.9 ANXIETY DISORDER, UNSPECIFIED: ICD-10-CM

## 2024-07-23 PROCEDURE — 36415 COLL VENOUS BLD VENIPUNCTURE: CPT

## 2024-07-23 PROCEDURE — G0296 VISIT TO DETERM LDCT ELIG: CPT

## 2024-07-23 PROCEDURE — G2211 COMPLEX E/M VISIT ADD ON: CPT

## 2024-07-23 PROCEDURE — 99214 OFFICE O/P EST MOD 30 MIN: CPT

## 2024-07-23 NOTE — ASSESSMENT
[FreeTextEntry1] : HCM: -July 2023 -HIV/HC screening -Blood drawn today  Mammo/ Gyn/ Colonoscopy: refused Lung cancer screening: CT chest order.  # Nicotine dependence: -Advise and counseling -CT low dose order  # COPD: -F/up with Pulmonology. -On Advair -On Albuterol prn  # CAD/ HTN: -F/up with cardiology -On Atorvastatin. -On Apirin -On Plavix -On Lisinopril, Metoprolol  # Depression/ Anxiety: -F/up with Psychiatry, Dr Echavarria. -On Fluoxetine and Aripiprazole. -Hydroxyzine prn.  # Immunizations: -Advise for Shingrix at Pharmacy.  # ADL> request assistance for Housekeeping. -Pt was advise to request health Aid through Insurance and then forms will be completed. -Pt states Insurance advise Doctor note,

## 2024-07-23 NOTE — HEALTH RISK ASSESSMENT
[Yes] : Yes [No] : In the past 12 months have you used drugs other than those required for medical reasons? No [No falls in past year] : Patient reported no falls in the past year [Current] : Current [20 or more] : 20 or more [0] : 2) Feeling down, depressed, or hopeless: Not at all (0) [PHQ-2 Negative - No further assessment needed] : PHQ-2 Negative - No further assessment needed [de-identified] : sober 1 year and 6 months [[id number]] : 0

## 2024-07-23 NOTE — COUNSELING
[Fall prevention counseling provided] : Fall prevention counseling provided [Behavioral health counseling provided] : Behavioral health counseling provided [Yes] : Risk of tobacco use and health benefits of smoking cessation discussed: Yes [No] : Not willing to quit smoking [None] : None [Good understanding] : Patient has a good understanding of lifestyle changes and steps needed to achieve self management goal [ - Annual Lung Cancer Screening/Share Decision Making Discussion] : Annual Lung Cancer Screening/Share Decision Making Discussion. (I have advised this patient to have a Low Dose CT (LDCT) scan of the lungs and have discussed the following with the patient in a shared decision making discussion:   Benefits of Detection and Early Treatment: There is adequate evidence that annual screening for lung cancer with LDCT in a population of high-risk persons can prevent a substantial number of lung cancer-related deaths. The magnitude of benefit depends on the individual patient's risk for lung cancer, as those who are at highest risk are most likely to benefit. Screening cannot prevent most lung cancer-related deaths, and does not replace smoking cessation. Harms of Detection and Early Intervention and Treatment: The harms associated with LDCT screening include false-negative and false-positive results, incidental findings, over diagnosis, and radiation exposure. False-positive LDCT results occur in a substantial proportion of screened persons; 95% of all positive results do not lead to a diagnosis of cancer. In a high-quality screening program, further imaging can resolve most false-positive results; however, some patients may require invasive procedures. Radiation harms, including cancer resulting from cumulative exposure to radiation, vary depending on the age at the start of screening; the number of scans received; and the person's exposure to other sources of radiation, particularly other medical imaging.)

## 2024-07-23 NOTE — HISTORY OF PRESENT ILLNESS
[FreeTextEntry1] : f/up [de-identified] : 59 year old female smoker with history of ,Alcohol abuse in remission, CAD s/p PCI, HTN, HLD, COPD, Depression, Anxiety and Alcoholism. CVA 11/2022 L MCA occlusion/Stenosis. She was given tPA.. TTE with EF of 40-45% with wall motion abnormalities,. She relapse smoking. No alcohol since CVA. Seen by psychiatry, Dr Echavarria.in the past. Pulmonology: Dr Son Cardiology: Dr FISHER. She is on disability for Mental disorder. Single, G0 Needs assistance with ADL. She states can't tolerate dust, fumes, inhalation due to COPD

## 2024-07-24 LAB
25(OH)D3 SERPL-MCNC: 38.7 NG/ML
ALBUMIN SERPL ELPH-MCNC: 4.4 G/DL
ALP BLD-CCNC: 85 U/L
ALT SERPL-CCNC: 14 U/L
ANION GAP SERPL CALC-SCNC: 13 MMOL/L
APPEARANCE: CLEAR
AST SERPL-CCNC: 15 U/L
BASOPHILS # BLD AUTO: 0.08 K/UL
BASOPHILS NFR BLD AUTO: 1.1 %
BILIRUB SERPL-MCNC: 0.3 MG/DL
BILIRUBIN URINE: NEGATIVE
BLOOD URINE: NEGATIVE
BUN SERPL-MCNC: 15 MG/DL
CALCIUM SERPL-MCNC: 9.2 MG/DL
CHLORIDE SERPL-SCNC: 103 MMOL/L
CHOLEST SERPL-MCNC: 238 MG/DL
CO2 SERPL-SCNC: 24 MMOL/L
COLOR: YELLOW
CREAT SERPL-MCNC: 0.67 MG/DL
EGFR: 100 ML/MIN/1.73M2
EOSINOPHIL # BLD AUTO: 0.58 K/UL
EOSINOPHIL NFR BLD AUTO: 7.8 %
ESTIMATED AVERAGE GLUCOSE: 111 MG/DL
GLUCOSE QUALITATIVE U: NEGATIVE MG/DL
GLUCOSE SERPL-MCNC: 97 MG/DL
HBA1C MFR BLD HPLC: 5.5 %
HCT VFR BLD CALC: 39.1 %
HDLC SERPL-MCNC: 47 MG/DL
HGB BLD-MCNC: 12.3 G/DL
IMM GRANULOCYTES NFR BLD AUTO: 0.5 %
KETONES URINE: NEGATIVE MG/DL
LDLC SERPL CALC-MCNC: 156 MG/DL
LEUKOCYTE ESTERASE URINE: ABNORMAL
LYMPHOCYTES # BLD AUTO: 1.96 K/UL
LYMPHOCYTES NFR BLD AUTO: 26.5 %
MAN DIFF?: NORMAL
MCHC RBC-ENTMCNC: 30.8 PG
MCHC RBC-ENTMCNC: 31.5 GM/DL
MCV RBC AUTO: 97.8 FL
MONOCYTES # BLD AUTO: 0.41 K/UL
MONOCYTES NFR BLD AUTO: 5.5 %
NEUTROPHILS # BLD AUTO: 4.34 K/UL
NEUTROPHILS NFR BLD AUTO: 58.6 %
NITRITE URINE: NEGATIVE
NONHDLC SERPL-MCNC: 191 MG/DL
PH URINE: 7
PLATELET # BLD AUTO: 364 K/UL
POTASSIUM SERPL-SCNC: 4.2 MMOL/L
PROT SERPL-MCNC: 7 G/DL
PROTEIN URINE: NEGATIVE MG/DL
RBC # BLD: 4 M/UL
RBC # FLD: 14.4 %
SODIUM SERPL-SCNC: 140 MMOL/L
SPECIFIC GRAVITY URINE: 1.01
TRIGL SERPL-MCNC: 194 MG/DL
TSH SERPL-ACNC: 1.43 UIU/ML
UROBILINOGEN URINE: 1 MG/DL
WBC # FLD AUTO: 7.41 K/UL

## 2024-07-28 ENCOUNTER — NON-APPOINTMENT (OUTPATIENT)
Age: 59
End: 2024-07-28

## 2024-08-05 NOTE — PATIENT PROFILE ADULT - FLU SEASON?
[facility]  07/19/2024     Patient Name: Kitty Berry   YOB: 1954   Medical Record Number: [id number]   Primary Care Physician: Cici Martinez MD     Subjective   Chief Complaint     Chief Complaint   Patient presents with    Leg Swelling    Sleep Apnea         History of Present Illness     presents with bilateral lower extremity edema, worse on the left side where she is scheduled to have knee surgery on 8/29/2024. The swelling is worse with sitting. She also reports waking up around 4:00-4:30 AM with a feeling of gasping for air, which improves with getting up and moving around. She has been taking half a Xanax which helps calm her down and allows her to go back to sleep. She is concerned these issues may interfere with her upcoming knee surgery.    Knee Osteoarthritis   - Scheduled for left knee surgery on 8/29/2024  - Had to wait 3 months after last Euflexxa injection on 5/22/2024  - Euflexxa injections were working well previously but recently stopped being effective  - Constant knee pain, worse with walking long distances  - Using a knee brace which provides some relief  -  has taken over grocery shopping duties due to mobility limitations    Anxiety  - Long-standing history of anxiety, on Xanax 0.5 mg prn for over 20 years  - Anxiety began after waking up from anesthesia following a breast surgery many years ago   - Anxiety triggered by laying flat on her back, such as at the dentist office  - Recent increase in early morning awakening around 4:00-4:30 AM with feeling of not being able to breathe, which resolves after getting up and moving around  - Has been taking 1/2 tablet of Xanax at night which has eliminated the early morning awakening and associated anxiety    Obstructive Sleep Apnea  - Reports occasional O2 sat dropping to 84-86% at night, which improves to 93-95% when she sits up  - Scheduled for sleep medicine consult on 8/24/2024 
"    Restrictive Lung Disease  - History of radiation-induced lung scarring causing restrictive lung disease  - Follows with pulmonologist every 6 months  - Most recent CT showed resolution of previously seen 11 mm lung nodule   - Continues to have exertional dyspnea   - Not currently on any inhalers as pulmonologist felt they were not indicated    Chronic Back Pain  - Underwent back ablation on 5/9/2024 with pain management which helped initially  - Back pain has returned, likely due to altered gait from knee pain  - Following up with pain management 2 months after knee surgery          Review of Systems   A medically appropriate and patient-specific review of systems was performed. Pertinent findings are mentioned in the HPI, with no additional significant findings beyond those already noted.    Patient History   The following portions of the patient's history were reviewed and updated as appropriate:   allergies, current medications, problem list, PMHx, PSHx, PFHx, past social history      Objective   Vitals     Vitals:    07/19/24 1617   BP: 133/83   Pulse: 75   Resp: 16   SpO2: 97%   Weight: 93.4 kg (206 lb)   Height: 157.5 cm (62.01\")      BMI Readings from Last 3 Encounters:   07/24/24 37.93 kg/m²   07/19/24 37.67 kg/m²   06/19/24 37.45 kg/m²         Physical Exam   Constitutional: Alert, well-appearing, no acute distress  HENT: NCAT, mucous membranes moist  Neck: Supple  Respiratory: No respiratory distress, good effort and air entry, clear to auscultation bilaterally   Cardiovascular: RRR, bilateral 1+ pitting edema L slightly worse than R  Psychiatric: Appropriate mood and affect, cooperative  Skin: No apparent rashes or lesions        Assessment & Plan     Bilateral Lower Extremity Edema  - Likely multifactorial - venous stasis, sedentary lifestyle due to knee pain, possible contribution from heart disease  - Left worse than right, probably related to advanced knee osteoarthritis. Lower suspicion for DVT "
BILLY Erazo
given exam.   PLAN:   - Trial of low-dose oral furosemide 20 mg daily   - Encourage mobilization and leg elevation when sitting for prolonged periods  - Monitor for improvement, if edema persists may need further evaluation     Nighttime Hypoxia, Potential WILLIAN  - Sleep medicine consultation scheduled for 8/24/2024  - Preoperative evaluation and recommendations pending sleep study results  - Anticipate possible need for supplemental oxygen during and after surgery but otherwise anticipate good recovery    Knee Osteoarthritis  Severe osteoarthritis of the left knee, scheduled for arthroplasty on 8/29/2024. Conservative management with injections and NSAIDs no longer providing adequate relief.    PLAN:  - Proceed with left total knee arthroplasty as scheduled on 8/29/2024  - Continue meloxicam for pain control preoperatively   - Postoperative physical therapy and rehabilitation     Anxiety  Long-standing generalized anxiety disorder, well-controlled on low-dose Xanax for many years but with recent exacerbation in setting of upcoming knee surgery.  PLAN:  - Continue Xanax 0.5 mg 1 tab prn for anxiety  - Okay to increase to 1 tab prn in short-term preoperatively if needed for anxiety related to upcoming surgery  - Reevaluate anxiety symptoms postoperatively, consider initiating SSRI if anxiety persists beyond acute surgical period      Restrictive Lung Disease  Stable restrictive lung disease secondary to prior breast radiation. No new findings on recent CT. Continues to report exertional dyspnea.  PLAN:  - Continue regular follow up with pulmonology  - Revisit possible need for pulmonary rehab or pharmacologic management if dyspnea persists and limits function after knee replacement and rehabilitation    Chronic Back Pain   - Continue follow up with pain management  - Reevaluate 2 months postoperatively after knee replacement, may need repeat ablation or other interventions  - Coordinate with physical therapy for back 
exercises and proper body mechanics after knee surgery          In addition to the above, I also completed the following during today's visit: educated the patient and/or family on the overall impression and recommended plan of care, encouraged the patient/family to voice questions, and addressed all inquiries. I reviewed the appropriate use of any current medications and emphasized important side effects to be aware of, provided education on any new medications that were started, including their indications, proper administration, dosing, and potential side effects as applicable. We reviewed return precautions and reasons to seek urgent/emergent care, and if indicated, I reiterated the importance of maintaining a healthy diet and regular physical activity on chronic conditions and overall well-being.     Follow Up   No follow-ups on file.      This medical documentation was produced in part using speech recognition software (Dragon Dictation System) and may contain errors related to that system including errors in grammar, punctuation, and spelling, as well as words and phrases that may be inappropriate and not intentional --- If there are any questions or concerns please feel free to contact me, the dictating provider, for clarification.        Disclaimer For Patients: Per the Federal 21st Century CURES Act and as of April 2021, medical records (including provider notes and laboratory/imaging results) are to be made available to patient’s and/or their designees as soon as the documents are signed/resulted. While the intention is to ensure transparency and to engage patients in their healthcare, this immediate access may create unintended consequences. This document uses language intended for communication between medical experts and diagnostic results are often interpreted with the entirety of the patient’s clinical picture in mind. It is recommended that patients and/or their designees review all available 
information with their primary or specialist providers for explanation and guidance to avoid misinterpretation based on layperson understanding, non-medical expert opinions, or internet searches.      Cici Martinez MD     
Yes...

## 2024-08-21 ENCOUNTER — NON-APPOINTMENT (OUTPATIENT)
Age: 59
End: 2024-08-21

## 2024-08-22 ENCOUNTER — APPOINTMENT (OUTPATIENT)
Dept: ELECTROPHYSIOLOGY | Facility: CLINIC | Age: 59
End: 2024-08-22
Payer: MEDICARE

## 2024-08-22 PROCEDURE — 93298 REM INTERROG DEV EVAL SCRMS: CPT

## 2024-08-27 ENCOUNTER — APPOINTMENT (OUTPATIENT)
Dept: FAMILY MEDICINE | Facility: CLINIC | Age: 59
End: 2024-08-27

## 2024-08-28 ENCOUNTER — RX RENEWAL (OUTPATIENT)
Age: 59
End: 2024-08-28

## 2024-09-11 ENCOUNTER — RX RENEWAL (OUTPATIENT)
Age: 59
End: 2024-09-11

## 2024-09-25 ENCOUNTER — NON-APPOINTMENT (OUTPATIENT)
Age: 59
End: 2024-09-25

## 2024-09-26 ENCOUNTER — APPOINTMENT (OUTPATIENT)
Dept: ELECTROPHYSIOLOGY | Facility: CLINIC | Age: 59
End: 2024-09-26
Payer: MEDICARE

## 2024-09-26 PROCEDURE — 93298 REM INTERROG DEV EVAL SCRMS: CPT

## 2024-10-15 ENCOUNTER — APPOINTMENT (OUTPATIENT)
Dept: FAMILY MEDICINE | Facility: CLINIC | Age: 59
End: 2024-10-15
Payer: MEDICARE

## 2024-10-15 VITALS
HEIGHT: 64 IN | BODY MASS INDEX: 19.81 KG/M2 | TEMPERATURE: 98 F | SYSTOLIC BLOOD PRESSURE: 136 MMHG | RESPIRATION RATE: 14 BRPM | WEIGHT: 116 LBS | DIASTOLIC BLOOD PRESSURE: 82 MMHG | HEART RATE: 75 BPM | OXYGEN SATURATION: 98 %

## 2024-10-15 DIAGNOSIS — E78.5 HYPERLIPIDEMIA, UNSPECIFIED: ICD-10-CM

## 2024-10-15 DIAGNOSIS — Z00.00 ENCOUNTER FOR GENERAL ADULT MEDICAL EXAMINATION W/OUT ABNORMAL FINDINGS: ICD-10-CM

## 2024-10-15 PROCEDURE — 90656 IIV3 VACC NO PRSV 0.5 ML IM: CPT

## 2024-10-15 PROCEDURE — 99396 PREV VISIT EST AGE 40-64: CPT

## 2024-10-15 PROCEDURE — G0439: CPT

## 2024-10-15 PROCEDURE — 36415 COLL VENOUS BLD VENIPUNCTURE: CPT

## 2024-10-15 PROCEDURE — G0444 DEPRESSION SCREEN ANNUAL: CPT | Mod: 59

## 2024-10-15 PROCEDURE — G0008: CPT

## 2024-10-16 LAB
ALBUMIN SERPL ELPH-MCNC: 4.5 G/DL
ALP BLD-CCNC: 77 U/L
ALT SERPL-CCNC: 14 U/L
ANION GAP SERPL CALC-SCNC: 11 MMOL/L
AST SERPL-CCNC: 18 U/L
BILIRUB SERPL-MCNC: 0.3 MG/DL
BUN SERPL-MCNC: 22 MG/DL
CALCIUM SERPL-MCNC: 9 MG/DL
CHLORIDE SERPL-SCNC: 105 MMOL/L
CHOLEST SERPL-MCNC: 144 MG/DL
CO2 SERPL-SCNC: 25 MMOL/L
CREAT SERPL-MCNC: 0.61 MG/DL
EGFR: 102 ML/MIN/1.73M2
GLUCOSE SERPL-MCNC: 116 MG/DL
HDLC SERPL-MCNC: 48 MG/DL
LDLC SERPL CALC-MCNC: 80 MG/DL
NONHDLC SERPL-MCNC: 96 MG/DL
POTASSIUM SERPL-SCNC: 4.6 MMOL/L
PROT SERPL-MCNC: 7.2 G/DL
SODIUM SERPL-SCNC: 142 MMOL/L
TRIGL SERPL-MCNC: 86 MG/DL

## 2024-10-31 ENCOUNTER — NON-APPOINTMENT (OUTPATIENT)
Age: 59
End: 2024-10-31

## 2024-10-31 ENCOUNTER — APPOINTMENT (OUTPATIENT)
Dept: ELECTROPHYSIOLOGY | Facility: CLINIC | Age: 59
End: 2024-10-31
Payer: MEDICARE

## 2024-10-31 PROCEDURE — 93298 REM INTERROG DEV EVAL SCRMS: CPT

## 2024-11-15 ENCOUNTER — RX RENEWAL (OUTPATIENT)
Age: 59
End: 2024-11-15

## 2024-11-15 RX ORDER — ALBUTEROL SULFATE 90 UG/1
108 (90 BASE) INHALANT RESPIRATORY (INHALATION)
Qty: 1 | Refills: 8 | Status: ACTIVE | COMMUNITY
Start: 2024-11-15 | End: 1900-01-01

## 2024-12-04 NOTE — ED ADULT TRIAGE NOTE - CHIEF COMPLAINT QUOTE
Review of Systems Health Update    Patient: Sameera Díaz         : 1960     Please fill out the All Patient section and any section below that pertains to your visit today.    ALL PATIENTS:  NO    Chest Pain  NO   New shortness of breath  NO Unexplained weight change    WEIGHT MANAGEMENT:  NO    Stress eating   YES New joint pain/redness  NO   Dizziness/Lightheadedness  NO   Leg Cramps  NO Worsening depression/Anxiety  YES Heartburn  YES Constipation/Diarrhea  NO   Hair Loss  NO   New Kidney stones  NO   Rash   on antidepressants, used to be on Suboxone.  PMD refusing to give meds.

## 2024-12-05 ENCOUNTER — NON-APPOINTMENT (OUTPATIENT)
Age: 59
End: 2024-12-05

## 2024-12-05 ENCOUNTER — APPOINTMENT (OUTPATIENT)
Dept: ELECTROPHYSIOLOGY | Facility: CLINIC | Age: 59
End: 2024-12-05
Payer: MEDICARE

## 2024-12-05 PROCEDURE — 93298 REM INTERROG DEV EVAL SCRMS: CPT

## 2024-12-24 PROBLEM — F10.90 ALCOHOL USE: Status: ACTIVE | Noted: 2021-09-20

## 2025-01-09 ENCOUNTER — APPOINTMENT (OUTPATIENT)
Dept: ELECTROPHYSIOLOGY | Facility: CLINIC | Age: 60
End: 2025-01-09
Payer: MEDICARE

## 2025-01-09 ENCOUNTER — NON-APPOINTMENT (OUTPATIENT)
Age: 60
End: 2025-01-09

## 2025-01-09 ENCOUNTER — RX CHANGE (OUTPATIENT)
Age: 60
End: 2025-01-09

## 2025-01-09 PROCEDURE — 93298 REM INTERROG DEV EVAL SCRMS: CPT

## 2025-02-11 ENCOUNTER — RX RENEWAL (OUTPATIENT)
Age: 60
End: 2025-02-11

## 2025-02-13 ENCOUNTER — APPOINTMENT (OUTPATIENT)
Dept: ELECTROPHYSIOLOGY | Facility: CLINIC | Age: 60
End: 2025-02-13

## 2025-02-13 PROCEDURE — 93298 REM INTERROG DEV EVAL SCRMS: CPT

## 2025-02-15 ENCOUNTER — RX RENEWAL (OUTPATIENT)
Age: 60
End: 2025-02-15

## 2025-02-24 ENCOUNTER — RX CHANGE (OUTPATIENT)
Age: 60
End: 2025-02-24

## 2025-02-27 ENCOUNTER — RX RENEWAL (OUTPATIENT)
Age: 60
End: 2025-02-27

## 2025-03-20 ENCOUNTER — APPOINTMENT (OUTPATIENT)
Dept: ELECTROPHYSIOLOGY | Facility: CLINIC | Age: 60
End: 2025-03-20

## 2025-03-20 PROCEDURE — 93298 REM INTERROG DEV EVAL SCRMS: CPT

## 2025-04-14 ENCOUNTER — APPOINTMENT (OUTPATIENT)
Dept: FAMILY MEDICINE | Facility: CLINIC | Age: 60
End: 2025-04-14
Payer: MEDICARE

## 2025-04-14 VITALS
SYSTOLIC BLOOD PRESSURE: 128 MMHG | HEIGHT: 64 IN | HEART RATE: 80 BPM | OXYGEN SATURATION: 98 % | WEIGHT: 115 LBS | DIASTOLIC BLOOD PRESSURE: 72 MMHG | BODY MASS INDEX: 19.63 KG/M2 | RESPIRATION RATE: 14 BRPM

## 2025-04-14 VITALS — BODY MASS INDEX: 20.43 KG/M2 | WEIGHT: 119 LBS

## 2025-04-14 DIAGNOSIS — I25.10 ATHEROSCLEROTIC HEART DISEASE OF NATIVE CORONARY ARTERY W/OUT ANGINA PECTORIS: ICD-10-CM

## 2025-04-14 PROCEDURE — 99214 OFFICE O/P EST MOD 30 MIN: CPT

## 2025-04-14 PROCEDURE — G2211 COMPLEX E/M VISIT ADD ON: CPT

## 2025-04-14 PROCEDURE — 36415 COLL VENOUS BLD VENIPUNCTURE: CPT

## 2025-04-15 ENCOUNTER — NON-APPOINTMENT (OUTPATIENT)
Age: 60
End: 2025-04-15

## 2025-04-15 LAB
ALBUMIN SERPL ELPH-MCNC: 4.9 G/DL
ALP BLD-CCNC: 88 U/L
ALT SERPL-CCNC: 14 U/L
ANION GAP SERPL CALC-SCNC: 15 MMOL/L
AST SERPL-CCNC: 16 U/L
BILIRUB SERPL-MCNC: 0.2 MG/DL
BUN SERPL-MCNC: 15 MG/DL
CALCIUM SERPL-MCNC: 9.9 MG/DL
CHLORIDE SERPL-SCNC: 102 MMOL/L
CHOLEST SERPL-MCNC: 246 MG/DL
CO2 SERPL-SCNC: 22 MMOL/L
CREAT SERPL-MCNC: 0.51 MG/DL
EGFRCR SERPLBLD CKD-EPI 2021: 107 ML/MIN/1.73M2
FOLATE SERPL-MCNC: >20 NG/ML
GLUCOSE SERPL-MCNC: 103 MG/DL
HCT VFR BLD CALC: 36.8 %
HDLC SERPL-MCNC: 45 MG/DL
HGB BLD-MCNC: 11.9 G/DL
LDLC SERPL-MCNC: 171 MG/DL
MCHC RBC-ENTMCNC: 30.9 PG
MCHC RBC-ENTMCNC: 32.3 G/DL
MCV RBC AUTO: 95.6 FL
NONHDLC SERPL-MCNC: 201 MG/DL
PLATELET # BLD AUTO: 393 K/UL
POTASSIUM SERPL-SCNC: 3.9 MMOL/L
PROT SERPL-MCNC: 7.5 G/DL
RBC # BLD: 3.85 M/UL
RBC # FLD: 14.3 %
SODIUM SERPL-SCNC: 140 MMOL/L
TRIGL SERPL-MCNC: 163 MG/DL
VIT B12 SERPL-MCNC: 859 PG/ML
WBC # FLD AUTO: 9.49 K/UL

## 2025-04-24 ENCOUNTER — APPOINTMENT (OUTPATIENT)
Dept: ELECTROPHYSIOLOGY | Facility: CLINIC | Age: 60
End: 2025-04-24
Payer: MEDICARE

## 2025-04-24 ENCOUNTER — NON-APPOINTMENT (OUTPATIENT)
Age: 60
End: 2025-04-24

## 2025-04-24 PROCEDURE — 93298 REM INTERROG DEV EVAL SCRMS: CPT

## 2025-04-28 ENCOUNTER — RX RENEWAL (OUTPATIENT)
Age: 60
End: 2025-04-28

## 2025-05-29 ENCOUNTER — APPOINTMENT (OUTPATIENT)
Dept: ELECTROPHYSIOLOGY | Facility: CLINIC | Age: 60
End: 2025-05-29
Payer: MEDICARE

## 2025-05-29 ENCOUNTER — NON-APPOINTMENT (OUTPATIENT)
Age: 60
End: 2025-05-29

## 2025-05-29 PROCEDURE — 93298 REM INTERROG DEV EVAL SCRMS: CPT

## 2025-06-11 NOTE — BH CONSULTATION LIAISON ASSESSMENT NOTE - INTERRUPTED ATTEMPT:
Follow up with pediatrician, call for an appointment  Return to ed for new or worsening symptoms  Push fluids.    Yes > 3 months ago

## 2025-06-20 ENCOUNTER — RX RENEWAL (OUTPATIENT)
Age: 60
End: 2025-06-20

## 2025-06-30 ENCOUNTER — NON-APPOINTMENT (OUTPATIENT)
Age: 60
End: 2025-06-30

## 2025-06-30 ENCOUNTER — APPOINTMENT (OUTPATIENT)
Dept: ELECTROPHYSIOLOGY | Facility: CLINIC | Age: 60
End: 2025-06-30
Payer: MEDICARE

## 2025-06-30 PROCEDURE — 93298 REM INTERROG DEV EVAL SCRMS: CPT

## 2025-08-04 ENCOUNTER — APPOINTMENT (OUTPATIENT)
Dept: ELECTROPHYSIOLOGY | Facility: CLINIC | Age: 60
End: 2025-08-04

## 2025-08-04 PROCEDURE — 93298 REM INTERROG DEV EVAL SCRMS: CPT

## 2025-08-12 ENCOUNTER — RX CHANGE (OUTPATIENT)
Age: 60
End: 2025-08-12

## 2025-08-18 ENCOUNTER — RX RENEWAL (OUTPATIENT)
Age: 60
End: 2025-08-18

## 2025-09-08 ENCOUNTER — NON-APPOINTMENT (OUTPATIENT)
Age: 60
End: 2025-09-08

## 2025-09-08 ENCOUNTER — APPOINTMENT (OUTPATIENT)
Dept: ELECTROPHYSIOLOGY | Facility: CLINIC | Age: 60
End: 2025-09-08
Payer: MEDICARE

## 2025-09-08 PROCEDURE — 93298 REM INTERROG DEV EVAL SCRMS: CPT

## 2025-09-10 ENCOUNTER — RX CHANGE (OUTPATIENT)
Age: 60
End: 2025-09-10

## 2025-09-16 ENCOUNTER — APPOINTMENT (OUTPATIENT)
Dept: PULMONOLOGY | Facility: CLINIC | Age: 60
End: 2025-09-16
Payer: COMMERCIAL

## 2025-09-16 VITALS
BODY MASS INDEX: 20.14 KG/M2 | WEIGHT: 118 LBS | DIASTOLIC BLOOD PRESSURE: 72 MMHG | SYSTOLIC BLOOD PRESSURE: 132 MMHG | OXYGEN SATURATION: 97 % | HEIGHT: 64 IN | HEART RATE: 67 BPM

## 2025-09-16 DIAGNOSIS — F17.200 NICOTINE DEPENDENCE, UNSPECIFIED, UNCOMPLICATED: ICD-10-CM

## 2025-09-16 DIAGNOSIS — J43.9 EMPHYSEMA, UNSPECIFIED: ICD-10-CM

## 2025-09-16 PROCEDURE — 99406 BEHAV CHNG SMOKING 3-10 MIN: CPT

## 2025-09-16 PROCEDURE — 99204 OFFICE O/P NEW MOD 45 MIN: CPT

## 2025-09-16 PROCEDURE — G0296 VISIT TO DETERM LDCT ELIG: CPT

## 2025-09-16 RX ORDER — TIOTROPIUM BROMIDE AND OLODATEROL 3.124; 2.736 UG/1; UG/1
2.5-2.5 SPRAY, METERED RESPIRATORY (INHALATION)
Qty: 1 | Refills: 3 | Status: ACTIVE | COMMUNITY
Start: 2025-09-16 | End: 1900-01-01

## 2025-09-18 RX ORDER — UMECLIDINIUM BROMIDE AND VILANTEROL TRIFENATATE 62.5; 25 UG/1; UG/1
62.5-25 POWDER RESPIRATORY (INHALATION)
Qty: 3 | Refills: 3 | Status: ACTIVE | COMMUNITY
Start: 2025-09-18 | End: 1900-01-01